# Patient Record
Sex: FEMALE | NOT HISPANIC OR LATINO | Employment: OTHER | URBAN - METROPOLITAN AREA
[De-identification: names, ages, dates, MRNs, and addresses within clinical notes are randomized per-mention and may not be internally consistent; named-entity substitution may affect disease eponyms.]

---

## 2017-12-12 ENCOUNTER — OFFICE VISIT (OUTPATIENT)
Dept: FAMILY MEDICINE | Facility: CLINIC | Age: 82
End: 2017-12-12
Payer: COMMERCIAL

## 2017-12-12 ENCOUNTER — RADIANT APPOINTMENT (OUTPATIENT)
Dept: GENERAL RADIOLOGY | Facility: CLINIC | Age: 82
End: 2017-12-12
Attending: INTERNAL MEDICINE
Payer: COMMERCIAL

## 2017-12-12 VITALS
SYSTOLIC BLOOD PRESSURE: 110 MMHG | RESPIRATION RATE: 20 BRPM | HEIGHT: 62 IN | DIASTOLIC BLOOD PRESSURE: 70 MMHG | HEART RATE: 89 BPM | TEMPERATURE: 97.4 F | BODY MASS INDEX: 29.26 KG/M2 | WEIGHT: 159 LBS | OXYGEN SATURATION: 92 %

## 2017-12-12 DIAGNOSIS — K21.9 GASTROESOPHAGEAL REFLUX DISEASE WITHOUT ESOPHAGITIS: ICD-10-CM

## 2017-12-12 DIAGNOSIS — G47.00 INSOMNIA, UNSPECIFIED TYPE: ICD-10-CM

## 2017-12-12 DIAGNOSIS — R05.3 CHRONIC COUGH: ICD-10-CM

## 2017-12-12 DIAGNOSIS — M81.0 OSTEOPOROSIS WITHOUT CURRENT PATHOLOGICAL FRACTURE, UNSPECIFIED OSTEOPOROSIS TYPE: ICD-10-CM

## 2017-12-12 DIAGNOSIS — R05.3 CHRONIC COUGH: Primary | ICD-10-CM

## 2017-12-12 DIAGNOSIS — K59.00 CONSTIPATION, UNSPECIFIED CONSTIPATION TYPE: ICD-10-CM

## 2017-12-12 DIAGNOSIS — F32.A DEPRESSION, UNSPECIFIED DEPRESSION TYPE: ICD-10-CM

## 2017-12-12 DIAGNOSIS — I10 HYPERTENSION, UNSPECIFIED TYPE: ICD-10-CM

## 2017-12-12 PROCEDURE — 99204 OFFICE O/P NEW MOD 45 MIN: CPT | Performed by: INTERNAL MEDICINE

## 2017-12-12 PROCEDURE — 71020 XR CHEST 2 VW: CPT

## 2017-12-12 RX ORDER — GUAIFENESIN/DEXTROMETHORPHAN 100-10MG/5
5 SYRUP ORAL 4 TIMES DAILY PRN
Qty: 354 ML | Refills: 3 | Status: SHIPPED | OUTPATIENT
Start: 2017-12-12 | End: 2018-08-01

## 2017-12-12 RX ORDER — SENNOSIDES 8.6 MG
3 TABLET ORAL DAILY
Qty: 270 TABLET | Refills: 3 | Status: SHIPPED | OUTPATIENT
Start: 2017-12-12 | End: 2019-06-13

## 2017-12-12 RX ORDER — NICOTINE POLACRILEX 4 MG/1
20 GUM, CHEWING ORAL DAILY
Qty: 90 TABLET | Refills: 3 | Status: SHIPPED | OUTPATIENT
Start: 2017-12-12 | End: 2019-08-20

## 2017-12-12 RX ORDER — CYCLOBENZAPRINE HCL 5 MG
TABLET ORAL
COMMUNITY
Start: 2016-09-21 | End: 2017-12-12

## 2017-12-12 RX ORDER — ALENDRONATE SODIUM 70 MG/1
70 TABLET ORAL
Qty: 12 TABLET | Refills: 3 | Status: SHIPPED | OUTPATIENT
Start: 2017-12-12 | End: 2018-12-04

## 2017-12-12 RX ORDER — MULTIVITAMIN
1 TABLET ORAL DAILY
Qty: 90 TABLET | Refills: 3 | Status: SHIPPED | OUTPATIENT
Start: 2017-12-12 | End: 2019-05-20

## 2017-12-12 RX ORDER — ALBUTEROL SULFATE 0.63 MG/3ML
0.63 SOLUTION RESPIRATORY (INHALATION)
COMMUNITY
Start: 2015-11-13 | End: 2019-01-21

## 2017-12-12 RX ORDER — ALENDRONATE SODIUM 70 MG/1
TABLET ORAL
COMMUNITY
Start: 2017-05-15 | End: 2017-12-12

## 2017-12-12 RX ORDER — ACETAMINOPHEN 325 MG/1
650 TABLET ORAL EVERY 4 HOURS PRN
COMMUNITY
Start: 2012-02-09

## 2017-12-12 RX ORDER — ALBUTEROL SULFATE 0.83 MG/ML
2.5 SOLUTION RESPIRATORY (INHALATION) EVERY 4 HOURS PRN
Status: ON HOLD | COMMUNITY
Start: 2014-09-18 | End: 2024-06-07

## 2017-12-12 RX ORDER — AMLODIPINE BESYLATE 2.5 MG/1
2.5 TABLET ORAL DAILY
Qty: 90 TABLET | Refills: 3 | Status: SHIPPED | OUTPATIENT
Start: 2017-12-12 | End: 2019-08-20

## 2017-12-12 RX ORDER — CETIRIZINE HYDROCHLORIDE 10 MG/1
10 TABLET ORAL
COMMUNITY
Start: 2015-10-29 | End: 2020-11-25

## 2017-12-12 RX ORDER — LISINOPRIL 10 MG/1
10 TABLET ORAL
COMMUNITY
Start: 2015-12-22 | End: 2017-12-12

## 2017-12-12 RX ORDER — ESCITALOPRAM OXALATE 10 MG/1
TABLET ORAL
COMMUNITY
Start: 2016-11-21 | End: 2017-12-12

## 2017-12-12 RX ORDER — ZOLPIDEM TARTRATE 5 MG/1
5 TABLET ORAL
Qty: 30 TABLET | Refills: 5 | Status: SHIPPED | OUTPATIENT
Start: 2017-12-12 | End: 2018-03-27

## 2017-12-12 ASSESSMENT — ANXIETY QUESTIONNAIRES
6. BECOMING EASILY ANNOYED OR IRRITABLE: NOT AT ALL
7. FEELING AFRAID AS IF SOMETHING AWFUL MIGHT HAPPEN: SEVERAL DAYS
3. WORRYING TOO MUCH ABOUT DIFFERENT THINGS: NOT AT ALL
4. TROUBLE RELAXING: NOT AT ALL
GAD7 TOTAL SCORE: 1
GAD7 TOTAL SCORE: 1
5. BEING SO RESTLESS THAT IT IS HARD TO SIT STILL: NOT AT ALL
7. FEELING AFRAID AS IF SOMETHING AWFUL MIGHT HAPPEN: SEVERAL DAYS
1. FEELING NERVOUS, ANXIOUS, OR ON EDGE: NOT AT ALL
GAD7 TOTAL SCORE: 1
2. NOT BEING ABLE TO STOP OR CONTROL WORRYING: NOT AT ALL

## 2017-12-12 ASSESSMENT — PATIENT HEALTH QUESTIONNAIRE - PHQ9
SUM OF ALL RESPONSES TO PHQ QUESTIONS 1-9: 4
SUM OF ALL RESPONSES TO PHQ QUESTIONS 1-9: 4

## 2017-12-12 NOTE — PATIENT INSTRUCTIONS
Your chest xray looks good.                   You can use the cough suppressant as needed; up to 4 times per day,one teaspoon per dose.                           Have a good, safe winter. Please follow up in the spring.

## 2017-12-12 NOTE — MR AVS SNAPSHOT
After Visit Summary   12/12/2017    Aracelis Blakely    MRN: [id number]           Patient Information     Date Of Birth          3/21/1922        Visit Information        Provider Department      12/12/2017 1:30 PM Truman Palma MD; [facility]        Today's Diagnoses     Chronic cough    -  1    Hypertension, unspecified type        Insomnia, unspecified type        Gastroesophageal reflux disease without esophagitis        Osteoporosis without current pathological fracture, unspecified osteoporosis type        Constipation, unspecified constipation type        Depression, unspecified depression type          Care Instructions    Your chest xray looks good.                   You can use the cough suppressant as needed; up to 4 times per day,one teaspoon per dose.                           Have a good, safe winter. Please follow up in the spring.                          Follow-ups after your visit        Who to contact     If you have questions or need follow up information about today's clinic visit or your schedule please contact [facility] directly at [phone].  Normal or non-critical lab and imaging results will be communicated to you by MyChart, letter or phone within 4 business days after the clinic has received the results. If you do not hear from us within 7 days, please contact the clinic through MyChart or phone. If you have a critical or abnormal lab result, we will notify you by phone as soon as possible.  Submit refill requests through [employer] or call your pharmacy and they will forward the refill request to us. Please allow 3 business days for your refill to be completed.          Additional Information About Your Visit        Care EveryWhere ID     This is your Care EveryWhere ID. This could be used by other organizations to access your [place] medical records  [id number]        Your Vitals  "Were     Pulse Temperature Respirations Height Pulse Oximetry Breastfeeding?    89 97.4  F (36.3  C) 20 5' 2\" (1.575 m) 92% No    BMI (Body Mass Index)                   29.08 kg/m2            Blood Pressure from Last 3 Encounters:   12/12/17 110/70   09/17/16 (!) 172/121   06/23/16 131/73    Weight from Last 3 Encounters:   12/12/17 159 lb (72.1 kg)   09/17/16 160 lb (72.6 kg)   06/22/16 160 lb (72.6 kg)                 Today's Medication Changes          These changes are accurate as of: 12/12/17  2:39 PM.  If you have any questions, ask your nurse or doctor.               Start taking these medicines.        Dose/Directions    DAILY NETTA Tabs   Used for:  Osteoporosis without current pathological fracture, unspecified osteoporosis type   Started by:  Truman Palma MD        Dose:  1 tablet   Take 1 tablet by mouth daily   Quantity:  90 tablet   Refills:  3       guaiFENesin-dextromethorphan 100-10 MG/5ML syrup   Commonly known as:  ROBITUSSIN DM   Used for:  Chronic cough   Started by:  Truman Palma MD        Dose:  5 mL   Take 5 mLs by mouth 4 times daily as needed for cough   Quantity:  354 mL   Refills:  3         These medicines have changed or have updated prescriptions.        Dose/Directions    alendronate 70 MG tablet   Commonly known as:  FOSAMAX   This may have changed:    - how much to take  - when to take this   Used for:  Osteoporosis without current pathological fracture, unspecified osteoporosis type   Changed by:  Truman Palma MD        Dose:  70 mg   Take 1 tablet (70 mg) by mouth every 7 days   Quantity:  12 tablet   Refills:  3       amLODIPine 2.5 MG tablet   Commonly known as:  NORVASC   This may have changed:    - medication strength  - how much to take   Used for:  Hypertension, unspecified type   Changed by:  Truman Palma MD        Dose:  2.5 mg   Take 1 tablet (2.5 mg) by mouth daily   Quantity:  90 tablet   Refills:  3       sennosides 8.6 MG tablet   Commonly known as:  " SENOKOT   This may have changed:  how much to take   Used for:  Constipation, unspecified constipation type   Changed by:  Truman Palma MD        Dose:  3 tablet   Take 3 tablets by mouth daily   Quantity:  270 tablet   Refills:  3       sertraline 50 MG tablet   Commonly known as:  ZOLOFT   This may have changed:  when to take this   Used for:  Depression, unspecified depression type   Changed by:  Truman Palma MD        Dose:  50 mg   Take 1 tablet (50 mg) by mouth daily   Quantity:  90 tablet   Refills:  3         Stop taking these medicines if you haven't already. Please contact your care team if you have questions.     cyclobenzaprine 5 MG tablet   Commonly known as:  FLEXERIL   Stopped by:  Truman Palma MD           escitalopram 10 MG tablet   Commonly known as:  LEXAPRO   Stopped by:  Truman Palma MD           furosemide 20 MG tablet   Commonly known as:  LASIX   Stopped by:  Truman Palma MD           lisinopril 10 MG tablet   Commonly known as:  PRINIVIL/ZESTRIL   Stopped by:  Truman Palma MD                Where to get your medicines      Some of these will need a paper prescription and others can be bought over the counter.  Ask your nurse if you have questions.     Bring a paper prescription for each of these medications     alendronate 70 MG tablet    amLODIPine 2.5 MG tablet    aspirin 81 MG EC tablet    Calcium carb-Vitamin D 500 mg [place]-200 units 500-200 MG-UNIT per tablet    DAILY NETTA Tabs    guaiFENesin-dextromethorphan 100-10 MG/5ML syrup    omeprazole 20 MG tablet    sennosides 8.6 MG tablet    sertraline 50 MG tablet    zolpidem 5 MG tablet                Primary Care Provider Office Phone # Fax #    Oscar Stafford [phone]       [address] [address]        Equal Access to Services     VIRAJ ROSA AH: Cristel Ross, evangelista bell, jamel santiago  lula buisummer la'aan ah. So [facility] [phone].    ATENCIÓN: Si romulo bahena, tiene a bravo disposición servicios gratuitos de asistencia lingüística. Janett vázquez [phone].    We comply with applicable federal civil rights laws and [place] laws. We do not discriminate on the basis of race, color, national origin, age, disability, sex, sexual orientation, or gender identity.            Thank you!     Thank you for choosing [facility]  for your care. Our goal is always to provide you with excellent care. Hearing back from our patients is one way we can continue to improve our services. Please take a few minutes to complete the written survey that you may receive in the mail after your visit with us. Thank you!             Your Updated Medication List - Protect others around you: Learn how to safely use, store and throw away your medicines at www.disposemymeds.org.          This list is accurate as of: 12/12/17  2:39 PM.  Always use your most recent med list.                   Brand Name Dispense Instructions for use Diagnosis    acetaminophen 325 MG tablet    TYLENOL     Take 325-650 mg by mouth        * albuterol (2.5 MG/3ML) 0.083% neb solution      Inhale 2.5 mg into the lungs        * albuterol 0.63 MG/3ML nebulizer solution    ACCUNEB     Inhale 0.63 mg into the lungs        alendronate 70 MG tablet    FOSAMAX    12 tablet    Take 1 tablet (70 mg) by mouth every 7 days    Osteoporosis without current pathological fracture, unspecified osteoporosis type       amLODIPine 2.5 MG tablet    NORVASC    90 tablet    Take 1 tablet (2.5 mg) by mouth daily    Hypertension, unspecified type       aspirin 81 MG EC tablet     90 tablet    Take 1 tablet (81 mg) by mouth daily    Hypertension, unspecified type       B-12 100 MCG Tabs           Calcium carb-Vitamin D 500 mg [place]-200 units 500-200 MG-UNIT per tablet    OSCAL with D;Oyster Shell Calcium    180 tablet    Take 1 tablet by mouth 2 times  daily (with meals)        cetirizine 10 MG tablet    zyrTEC     Take 10 mg by mouth        DAILY NETTA Tabs     90 tablet    Take 1 tablet by mouth daily    Osteoporosis without current pathological fracture, unspecified osteoporosis type       guaiFENesin-dextromethorphan 100-10 MG/5ML syrup    ROBITUSSIN DM    354 mL    Take 5 mLs by mouth 4 times daily as needed for cough    Chronic cough       OMEGA-3 FISH OIL PO           omeprazole 20 MG tablet     90 tablet    Take 1 tablet (20 mg) by mouth daily    Gastroesophageal reflux disease without esophagitis       order for DME      Knee high compression stockings.  Light compression.  16-20mm Hg.  Ok 3 pairs.        sennosides 8.6 MG tablet    SENOKOT    270 tablet    Take 3 tablets by mouth daily    Constipation, unspecified constipation type       sertraline 50 MG tablet    ZOLOFT    90 tablet    Take 1 tablet (50 mg) by mouth daily    Depression, unspecified depression type       ZADITOR 0.025 % Soln ophthalmic solution   Generic drug:  ketotifen      1 drop        zolpidem 5 MG tablet    AMBIEN    30 tablet    Take 1 tablet (5 mg) by mouth nightly as needed for sleep    Insomnia, unspecified type       * Notice:  This list has 2 medication(s) that are the same as other medications prescribed for you. Read the directions carefully, and ask your doctor or other care provider to review them with you.

## 2017-12-12 NOTE — PROGRESS NOTES
"  SUBJECTIVE:   Aracelis Blakely is a 89 year old female who presents to clinic today for the following health issues:      New Patient/Transfer of Care- Establish Care here.        This 89-year-old woman is transferring from [facility].                The patient speaks no English.  She is originally from [place].               She is here with an .          Her last office visit with labs was in June 2017.                    She takes multiple medications, and we went through each 1 of them in turn.  She wants all of her medications refilled.                                                                                                                                                    her complaints include 3 months of cough.           \"dry,non-productive,worse at night\".         This is not especially bothersome.  She has never been a smoker.    No history of lung disease.  She would like a cough suppressant.                                                                                                                                                                           Some of her other health issues include that she takes a  PPI on a chronic, long-term basis.  This is in treatment of esophageal reflux symptoms.         She has no dysphagia.         Despite her GERD, she has taken alendronate \"for the past year\". Seems to tolerate ok.        She also complains of chronic insomnia.  She takes zolpidem; 5 mg BIW approx.         she also has a history of hypertension.  She reports only taking amlodipine for that at this time 2.5 mg per dose.           She also has a history of depression, controlled with sertraline 50 mg per day.  She also has long-standing constipation, treated with senna.       She wants refills of all of her medications, including her over-the-counter supplements.    Problem list and histories reviewed & adjusted, as indicated.      Patient Active Problem List    " Diagnosis Date Noted     Insomnia, unspecified type 12/12/2017     Priority: Medium     Hypertension, unspecified type 12/12/2017     Priority: Medium     Gastroesophageal reflux disease without esophagitis 12/12/2017     Priority: Medium     Osteoporosis without current pathological fracture, unspecified osteoporosis type 12/12/2017     Priority: Medium     HTN (hypertension) 12/30/2011     Priority: Medium     Past Surgical History:   Procedure Laterality Date     RELEASE CARPAL TUNNEL Right 6/13/2016     Social History     Social History     Marital status:      Spouse name: N/A     Number of children: N/A     Years of education: N/A     Occupational History     Not on file.     Social History Main Topics     Smoking status: Never Smoker     Smokeless tobacco: Never Used     Alcohol use No     Drug use: No     Sexual activity: No     Other Topics Concern     Not on file     Social History Narrative     Immunization History   Administered Date(s) Administered     Influenza (High Dose) 3 valent vaccine 10/15/2017     Influenza (IIV3) PF 09/19/2012, 09/27/2013, 10/20/2014, 10/29/2015, 11/18/2016     Pneumococcal (PCV 13) 11/13/2015     Pneumococcal 23 valent 10/15/2008     TDAP Vaccine (Adacel) 01/08/2014     Zoster vaccine, live 12/07/2009, 08/28/2013       Current Outpatient Prescriptions   Medication Sig Dispense Refill     albuterol (ACCUNEB) 0.63 MG/3ML nebulizer solution Inhale 0.63 mg into the lungs       albuterol (2.5 MG/3ML) 0.083% neb solution Inhale 2.5 mg into the lungs       acetaminophen (TYLENOL) 325 MG tablet Take 325-650 mg by mouth       alendronate (FOSAMAX) 70 MG tablet        cetirizine (ZYRTEC) 10 MG tablet Take 10 mg by mouth       lisinopril (PRINIVIL/ZESTRIL) 10 MG tablet Take 10 mg by mouth       ketotifen (ZADITOR) 0.025 % SOLN ophthalmic solution 1 drop       sertraline (ZOLOFT) 50 MG tablet Take 50 mg by mouth       order for DME Knee high compression stockings.  Light  "compression.  16-20mm Hg.  Ok 3 pairs.       Cyanocobalamin (B-12) 100 MCG TABS        Omega-3 Fatty Acids (OMEGA-3 FISH OIL PO)        amLODIPine (NORVASC) 5 MG tablet Take 5 mg by mouth daily.       calcium carb 1250 mg, 500 mg [place],/vitamin D 200 units (OSCAL WITH D) 500-200 MG-UNIT per tablet Take 1 tablet by mouth 2 times daily (with meals). 100 tablet 3     sennosides (SENNA) 8.6 MG tablet Take 2 tablets by mouth daily. 180 tablet 3     aspirin 81 MG EC tablet Take 1 tablet by mouth daily. 90 tablet 3     zolpidem (AMBIEN) 5 MG tablet Take 1 tablet by mouth nightly as needed for sleep. 30 tablet 5     Omeprazole 20 MG tablet Take 20 mg by mouth daily. 90 tablet 3     No Known Allergies  BP Readings from Last 3 Encounters:   12/12/17 110/70   09/17/16 (!) 172/121   06/23/16 131/73    Wt Readings from Last 3 Encounters:   12/12/17 159 lb (72.1 kg)   09/17/16 160 lb (72.6 kg)   06/22/16 160 lb (72.6 kg)                      Reviewed and updated as needed this visit by clinical staff       Reviewed and updated as needed this visit by Provider         ROS: See above plus  CONSTITUTIONAL:NEGATIVE for fever, chills, change in weight  RESP:NEGATIVE for cough-productive, hemoptysis and wheezing  CV: NEGATIVE for chest pain, palpitations or peripheral edema  GI: NEGATIVE for hematochezia and melena  PSYCHIATRIC: NEGATIVE for alcohol use    OBJECTIVE:                                                    /70 (BP Location: Left arm, Patient Position: Chair, Cuff Size: Adult Large)  Pulse 89  Temp 97.4  F (36.3  C)  Resp 20  Ht 5' 2\" (1.575 m)  Wt 159 lb (72.1 kg)  SpO2 92%  Breastfeeding? No  BMI 29.08 kg/m2  Body mass index is 29.08 kg/(m^2).  GENERAL APPEARANCE: alert, no distress and over weight  RESP: no rales or rhonchi  CV: regular rates and rhythm, normal S1 S2, no S3 or S4 and no murmur, click or rub, trace peripheral edema  MS: Mild kyphosis  NEURO: Normal strength and tone and she converses readily " with the , who reports this patient's cognition is intact even at her advanced age.    Diagnostic test results:  Recent Results (from the past 24 hour(s))   XR Chest 2 Views    Narrative    CHEST TWO VIEWS  12/12/2017 2:37 PM     HISTORY: Chronic cough       Impression    IMPRESSION: PA and lateral views of the chest. Lungs are clear. Heart  is normal in size. No effusions are evident. No pneumothorax. Mild  kyphosis lower thoracic spine is noted. No vertebral body compression  fracture is evident.    NABEEL GRIFFIN MD              ASSESSMENT/PLAN:                                                        ICD-10-CM    1. Chronic cough R05 XR Chest 2 Views     guaiFENesin-dextromethorphan (ROBITUSSIN DM) 100-10 MG/5ML syrup   2. Hypertension, unspecified type I10 amLODIPine (NORVASC) 2.5 MG tablet     aspirin 81 MG EC tablet   3. Insomnia, unspecified type G47.00 zolpidem (AMBIEN) 5 MG tablet   4. Gastroesophageal reflux disease without esophagitis K21.9 omeprazole 20 MG tablet   5. Osteoporosis without current pathological fracture, unspecified osteoporosis type M81.0 alendronate (FOSAMAX) 70 MG tablet     Multiple Vitamin (DAILY NETTA) TABS   6. Constipation, unspecified constipation type K59.00 sennosides (SENOKOT) 8.6 MG tablet   7. Depression, unspecified depression type F32.9 sertraline (ZOLOFT) 50 MG tablet       Summary and implications:  We reviewed her situation at length, along with her past history, her current situation and complaints, and her multiple medications.                        We do not find a serious etiology for her fairly minimal cough.  We will plan to monitor that for now.           I refilled each of her medications, they all seem to be reasonable treatments for her various conditions.                                   40 minute visit,over half counseling and coordination of care      Patient Instructions   Your chest xray looks good.                   You can use the cough  suppressant as needed; up to 4 times per day,one teaspoon per dose.                           Have a good, safe winter. Please follow up in the spring.                      Truman Palma MD  [facility]  Answers for HPI/ROS submitted by the patient on 12/12/2017   MIRA 7 TOTAL SCORE: 1  PHQ9 TOTAL SCORE: 4

## 2017-12-12 NOTE — NURSING NOTE
"Chief Complaint   Patient presents with     Establish Care       Initial /70 (BP Location: Left arm, Patient Position: Chair, Cuff Size: Adult Large)  Pulse 89  Temp 97.4  F (36.3  C)  Resp 20  Ht 5' 2\" (1.575 m)  Wt 159 lb (72.1 kg)  SpO2 92%  Breastfeeding? No  BMI 29.08 kg/m2 Estimated body mass index is 29.08 kg/(m^2) as calculated from the following:    Height as of this encounter: 5' 2\" (1.575 m).    Weight as of this encounter: 159 lb (72.1 kg).  Medication Reconciliation: complete   Nahomy Russo LPN  "

## 2017-12-13 ASSESSMENT — ANXIETY QUESTIONNAIRES: GAD7 TOTAL SCORE: 1

## 2017-12-21 ENCOUNTER — TELEPHONE (OUTPATIENT)
Dept: FAMILY MEDICINE | Facility: CLINIC | Age: 82
End: 2017-12-21

## 2017-12-21 NOTE — TELEPHONE ENCOUNTER
Reason for Call:  Form, our goal is to have forms completed with 72 hours, however, some forms may require a visit or additional information.    Type of letter, form or note:  medical    Who is the form from?: Home care    Where did the form come from: form was faxed in    What clinic location was the form placed at?: [facility]    Where the form was placed: 's Box: Truman Palma MD    What number is listed as a contact on the form?: [phone] phone [phone]       Additional comments: [facility]   disability verification     Call taken on 12/21/2017 at 1:16 PM by Supriya Thompson

## 2018-03-27 DIAGNOSIS — G47.00 INSOMNIA, UNSPECIFIED TYPE: ICD-10-CM

## 2018-03-28 PROBLEM — G47.00 INSOMNIA, UNSPECIFIED TYPE: Status: ACTIVE | Noted: 2017-12-12

## 2018-03-28 RX ORDER — ZOLPIDEM TARTRATE 5 MG/1
2.5 TABLET ORAL
Qty: 15 TABLET | Refills: 1 | Status: SHIPPED | OUTPATIENT
Start: 2018-03-28 | End: 2018-08-31

## 2018-03-28 NOTE — TELEPHONE ENCOUNTER
Controlled Substance Refill Request for zolpidem (AMBIEN) 5 MG tablet   Problem List Complete:  No     PROVIDER TO CONSIDER COMPLETION OF PROBLEM LIST AND OVERVIEW/CONTROLLED SUBSTANCE AGREEMENT    Controlled substance agreement on file: No.     Processing:  [facility] - Fax   checked in past 6 months?  Yes unable to find patient under

## 2018-03-28 NOTE — TELEPHONE ENCOUNTER
ZOLPIDEM TARTRATE 5 MG TABLET  Last Written Prescription Date:  12/12/2017  Last Fill Quantity: 30,   # refills: 5  Last Office Visit: No office visit with our providers  Future Office visit:       Routing refill request to provider for review/approval because:  Drug not on the [facility], [facility] or [facility] refill protocol or controlled substance

## 2018-03-28 NOTE — TELEPHONE ENCOUNTER
Requested Prescriptions   Pending Prescriptions Disp Refills     zolpidem (AMBIEN) 5 MG tablet [Pharmacy Med Name: ZOLPIDEM TARTRATE 5 MG TABLET]  Last Written Prescription Date:  12/12/17  Last Fill Quantity: 30,  # refills: 5   Last office visit: 12/12/2017 with prescribing provider:  Louie   Future Office Visit:     30 tablet 0     Sig: TAKE 1 TABLET BY MOUTH ONCE DAILY AS NEEDED FOR SLEEP.    There is no refill protocol information for this order

## 2018-04-16 ENCOUNTER — PATIENT OUTREACH (OUTPATIENT)
Dept: GERIATRIC MEDICINE | Facility: CLINIC | Age: 83
End: 2018-04-16

## 2018-04-16 NOTE — PROGRESS NOTES
[facility] [place] Care Coordination Contact  Initial Contact:     Client is new enrollee to [facility] effective 04- with [facility]. Client transferred from [facility].    No home visit required because this CM has received all required documentations from the previous CM.    Writer t/c to client, introduced self as client s new CM. Confirmed with client that the welcome letter with writer's name and contact information has been received.  Reviewed HRA/LTCC and POC with client. Client reports doing well with no significant change in health condition.    Writer reviewed the following with client:  ER visits: No  Hospitalizations: No  TCU stays: No    Unable to review any more information w/ client.  Client requested that writer call daughter.  Client tried to call daughter on another phone while still on the phone with  and writer.     Communication History     User: Flora Aquino LSW Date/time: 4/16/2018 12:14 PM    Comment: Introduced self as client's new CM. Requested a call back.    Context:  Outcome: Left Message    Phone number: [phone] Phone Type: Mobile    Comm. type: Telephone Call type: Outgoing    Contact: Yoselin Gonzalez Relation to patient: Emergency Contact    User: Flora Aquino LSW Date/time: 4/16/2018 12:04 PM    Context:  Outcome:     Phone number: [phone] Phone Type: Home Phone    Comm. type: Telephone Call type: Outgoing    Contact: Aracelis Shankar Relation to patient: Self        MARTA Montoya  [facility] [place]  [phone]  Fax: [phone]

## 2018-04-16 NOTE — PROGRESS NOTES
[facility] Care Coordination Contact  Communication History     User: Flora Aquino LSW Date/time: 4/16/2018 12:23 PM    Context:  Outcome:     Phone number: [phone] Phone Type: Mobile    Comm. type: Telephone Call type: Incoming    Contact: Yoselin Gonzalez Relation to patient: Emergency Contact     Significant health status changes: Reported no sig changes  Falls/Injuries: Unknown  ADL/IADL changes: No  Changes in services: No    Follow-Up Plan: Client informed of future contact in June 2018 to schedule Annual Home Visit, .  Contact information shared with member and family, encouraged client to call with any questions or concerns.    MARTA Montoya  [facility]  [phone]  Fax: [phone]

## 2018-05-09 ENCOUNTER — TRANSFERRED RECORDS (OUTPATIENT)
Dept: HEALTH INFORMATION MANAGEMENT | Facility: CLINIC | Age: 83
End: 2018-05-09

## 2018-06-01 ENCOUNTER — PATIENT OUTREACH (OUTPATIENT)
Dept: GERIATRIC MEDICINE | Facility: CLINIC | Age: 83
End: 2018-06-01

## 2018-06-01 NOTE — PROGRESS NOTES
[facility] Care Coordination Contact  Communication History     User: Flora Aquino LSW Date/time: 6/1/2018 11:28 AM    Comment: Spoke w/ Zora and informed her that writer was aware of Aracelis's upcoming PCA assessment and will be scheduling a home visit.    Context:  Outcome:     Phone number:  Phone Type:     Comm. type: Telephone Call type: Outgoing    Contact: [facility]: PCA Relation to patient:     User: Flora Aquino LSW Date/time: 6/1/2018 11:27 AM    Comment: Called as a reminder that Palmas PCA is due by 7/16/18.    Context:  Outcome:     Phone number:  Phone Type:     Comm. type: Telephone Call type: Incoming    Contact: [facility]: PCA Relation to patient:         MARTA Montoya  [facility]  [phone]  Fax: [phone]

## 2018-06-06 ENCOUNTER — TRANSFERRED RECORDS (OUTPATIENT)
Dept: HEALTH INFORMATION MANAGEMENT | Facility: CLINIC | Age: 83
End: 2018-06-06

## 2018-06-11 ENCOUNTER — PATIENT OUTREACH (OUTPATIENT)
Dept: GERIATRIC MEDICINE | Facility: CLINIC | Age: 83
End: 2018-06-11

## 2018-06-11 NOTE — PROGRESS NOTES
[facility] Care Coordination Contact    1st Attempt Annual Home Visit:    Call placed to Rossy utilizing a [employer]  through [employer] to schedule visit appointment to complete the annual HRA. Phone busy, unable to left vm. Will try again.    MARTA Montoya  [facility]  [phone]  Fax: [phone]

## 2018-06-13 ENCOUNTER — PATIENT OUTREACH (OUTPATIENT)
Dept: GERIATRIC MEDICINE | Facility: CLINIC | Age: 83
End: 2018-06-13

## 2018-06-13 NOTE — PROGRESS NOTES
[facility] Coord[facility] Contact  Communication History     User: Flora Aquino LSW Date/time: 6/13/2018  3:49 PM    Comment: [facility] representative reported that Kathia MA is active. No further action needed.    Context:  Outcome:     Phone number:  Phone Type:     Comm. type: Telephone Call type: Outgoing    Contact: [facility] Relation to patient:     User: Flora Aquino LSW Date/time: 6/13/2018  3:49 PM    Comment: Received Notification that MA is inactive    Context:  Outcome:     Phone number:  Phone Type:     Comm. type: Email Call type: Incoming    Contact: MA Inactive Notification Relation to patient:         MARTA Montoya  [facility]  [phone]  Fax: [phone]

## 2018-06-28 NOTE — PROGRESS NOTES
[facility] Care Coordination Contact  Call placed to member to schedule a home visit appointment to complete the annual HRA.  A home visit has been scheduled for Monday, July 9th at 2:00pm.   has been set up.     MARTA Montoya  [facility]  [phone]  Fax: [phone]

## 2018-06-28 NOTE — PROGRESS NOTES
[facility] Contact  2nd Try Annual Home Visit  Communication History     User: Flora Aquino LSW Date/time: 6/28/2018  9:41 AM    Comment: Spoke w/ Daughter.    Context:  Outcome:     Phone number: [phone] Phone Type: Mobile    Comm. type: Telephone Call type: Outgoing    Contact: Yoselin Gonzalez Relation to patient: Emergency Contact    User: Flora Aquino LSW Date/time: 6/28/2018  9:33 AM    Comment: Person that answered the phone said there wasn't a Jamileh at that number.    Context:  Outcome:     Phone number: [phone] Phone Type: Home Phone    Comm. type: Telephone Call type: Outgoing    Contact: Aracelis Shankar Relation to patient: Self     Daughter Yoselin verified that Aracelis's phone number is listed correctly.  Daughter is going to call writer back with a day and time for the annual home visit.    MARTA Montoya  [facility]  [phone]  Fax: [phone]

## 2018-07-09 ENCOUNTER — PATIENT OUTREACH (OUTPATIENT)
Dept: GERIATRIC MEDICINE | Facility: CLINIC | Age: 83
End: 2018-07-09

## 2018-07-09 RX ORDER — AZELASTINE HYDROCHLORIDE 0.5 MG/ML
1 SOLUTION/ DROPS OPHTHALMIC 2 TIMES DAILY
COMMUNITY

## 2018-07-09 ASSESSMENT — ACTIVITIES OF DAILY LIVING (ADL)
DEPENDENT_IADLS:: CLEANING;COOKING;LAUNDRY;SHOPPING;MEAL PREPARATION;TRANSPORTATION;MONEY MANAGEMENT;MEDICATION MANAGEMENT

## 2018-07-09 NOTE — PROGRESS NOTES
[facility] Care Coordination Contact    [facility] Home Visit Assessment     Home visit for Health Risk Assessment with Aracelis Blakely completed on July 9, 2018    Type of residence:: Apartment - handicap accessible  Current living arrangement:: I live alone     Assessment completed with:: Patient, Children, Other ()    Current Care Plan  Member currently receiving the following home care services:  None   Member currently receiving the following community resources: DME, PCA    Medication Review  Medication reconciliation completed in Epic: Yes  Medication set-up & administration: Family/informal caregiver sets up weekly.  Self-administers medications. Needs reminders  Medication understanding concerns (by member, family or CC): No  Medication adherence concerns (by member, family or CC): No    Mental/Behavioral Health   Depression Screening: Not completed d/t language and cultural barrier.  Mental health DX:: No   Mental health DX how managed:: Medication (sertraline (ZOLOFT) 50 MG tablet)  No current  services  Falls Assessment:   Fallen 2 or more times in the past year?: No   Any fall with injury in the past year?: Yes    ADL/IADL Dependencies:   Dependent ADLs:: Ambulation-cane, Ambulation-walker, Bathing, Dressing, Grooming, Positioning  Dependent IADLs:: Cleaning, Cooking, Laundry, Shopping, Meal Preparation, Transportation, Money Management, Medication Management    [facility] Plan sponsored benefits: Shared information re: Silver Sneakers/gym memberships, ASA, Calcium +D.    PCA Assessment completed at visit: Yes     Elderly Waiver Eligibility: Yes, but no EW needs. Will not be opening.    Care Plan & Recommendations: Plan of care to remain the same. No concerns.    See CC for detailed assessment information.    Follow-Up Plan: Member informed of future contact, plan to f/u with member with a 6 month telephone assessment.  Contact information shared with member and  family, encouraged member to call with any questions or concerns at any time.    [facility] care continuum providers: Please refer to Health Care Home on the Epic Problem List to view this patient's [facility] Care Plan Summary.    MARTA Montoya  [facility]  [phone]  Fax: [phone]

## 2018-07-18 ENCOUNTER — PATIENT OUTREACH (OUTPATIENT)
Dept: GERIATRIC MEDICINE | Facility: CLINIC | Age: 83
End: 2018-07-18

## 2018-07-19 ENCOUNTER — TELEPHONE (OUTPATIENT)
Dept: GERIATRIC MEDICINE | Facility: CLINIC | Age: 83
End: 2018-07-19

## 2018-07-19 ENCOUNTER — PATIENT OUTREACH (OUTPATIENT)
Dept: GERIATRIC MEDICINE | Facility: CLINIC | Age: 83
End: 2018-07-19

## 2018-07-19 NOTE — LETTER
[address], [address]  [place] [address]  Phone:  [phone]  Fax:  [phone]        July 19, 2018    ARACELIS DIAZ  [address]   [facility] [address]    Dear Aracelis,    Kelly is a copy of your completed PCA Assessment and Service Plan.  This is for your records and no action is required by you.  If you have additional questions regarding your assessment please contact me at [phone]. If you feel that your needs are not being met, please contact the Clinical Supervisor at [phone].    Sincerely,      Flora Aquino, \[facility]\""  Care Coordinator  [facility]  [phone]      Enclosure:  Completed PCA assessment

## 2018-07-19 NOTE — PROGRESS NOTES
[facility] Care Coordination Contact  Communication History     User: Flora Aquino LSW Date/time: 7/19/2018  2:27 PM    Comment: Spoke w/ Yoselin    Context:  Outcome:     Phone number: [phone] Phone Type: Home Phone    Comm. type: Telephone Call type: Outgoing    Contact: Yoselin Gonzalez Relation to patient: Emergency Contact        Clarified PCA information with Yoselin. Informed Yoselin that Ubaldo[facility]'s PCA hours would remain the same.    MARTA Montoya  [facility]  [phone]  Fax: [phone]

## 2018-07-19 NOTE — PROGRESS NOTES
"[facility] Care Coordination Contact  Communication History     User: Flora Aquino LSW Date/time: 7/19/2018  2:23 PM    Comment: [email or web address]    Context:  Outcome:     Phone number:  Phone Type:     Comm. type: Email Call type: Outgoing    Contact: [facility] Peorialuis miguel Care: 'Norris Conte' < Relation to patient: Other     \"Hi Norris,    I just wanted to let you know that Jamileh s PCA hours will remain the same. We will send you over the service plan and [employer] will set the new dates.      MARTA Montoya    [address] [address]  [email or web address]   [email or web address]   Office: [phone]  Cell: [phone]  Fax [phone]    Connect with [facility] on social media.\"    MARTA Montoya  [facility]  [phone]  Fax: [phone]    "

## 2018-07-19 NOTE — PROGRESS NOTES
"[facility] Care Coordination Contact  Communication History     User: Flora Aquino LSW Date/time: 7/19/2018  2:25 PM    Comment: [email or web address]    Context:  Outcome:     Phone number:  Phone Type:     Comm. type: Email Call type: Incoming    Contact: [facility] Care: 'Norris Conte' < Relation to patient: Other     \"Hi,  I sent your request to our billing and they should respond back to you later today.  Thank you,  Norris Conte  Navigator  Office: [phone]  Fax: [phone]  [address] [place] [address], [address] [address]  Email: [email or web address]   Website: [email or web address] \"    MARTA Montoya [facility]  [phone]  Fax: [phone]    "

## 2018-07-19 NOTE — PROGRESS NOTES
[facility] Care Coordination Contact  [facility]:  Faxed completed PCA assessment to PCA Agency and mailed copies to member.  Faxed MD Communication to PCP.  Emailed referral form for auth to Randy.    Iona Dhillon  Case Management Specialist  [facility]  [phone]

## 2018-07-31 PROBLEM — F32.A DEPRESSION, UNSPECIFIED DEPRESSION TYPE: Status: ACTIVE | Noted: 2018-07-31

## 2018-07-31 PROBLEM — R05.3 CHRONIC COUGH: Status: ACTIVE | Noted: 2018-07-31

## 2018-07-31 PROBLEM — M81.0 OSTEOPOROSIS WITHOUT CURRENT PATHOLOGICAL FRACTURE, UNSPECIFIED OSTEOPOROSIS TYPE: Status: ACTIVE | Noted: 2017-12-12

## 2018-07-31 PROBLEM — K59.00 CONSTIPATION, UNSPECIFIED CONSTIPATION TYPE: Status: ACTIVE | Noted: 2018-07-31

## 2018-07-31 PROBLEM — K21.9 GASTROESOPHAGEAL REFLUX DISEASE WITHOUT ESOPHAGITIS: Status: ACTIVE | Noted: 2017-12-12

## 2018-08-01 ENCOUNTER — OFFICE VISIT (OUTPATIENT)
Dept: FAMILY MEDICINE | Facility: CLINIC | Age: 83
End: 2018-08-01
Payer: COMMERCIAL

## 2018-08-01 ENCOUNTER — PATIENT OUTREACH (OUTPATIENT)
Dept: GERIATRIC MEDICINE | Facility: CLINIC | Age: 83
End: 2018-08-01

## 2018-08-01 VITALS
WEIGHT: 159 LBS | HEART RATE: 93 BPM | BODY MASS INDEX: 29.26 KG/M2 | RESPIRATION RATE: 20 BRPM | HEIGHT: 62 IN | SYSTOLIC BLOOD PRESSURE: 134 MMHG | DIASTOLIC BLOOD PRESSURE: 74 MMHG | OXYGEN SATURATION: 92 %

## 2018-08-01 DIAGNOSIS — M81.0 OSTEOPOROSIS WITHOUT CURRENT PATHOLOGICAL FRACTURE, UNSPECIFIED OSTEOPOROSIS TYPE: ICD-10-CM

## 2018-08-01 DIAGNOSIS — I10 HYPERTENSION, UNSPECIFIED TYPE: ICD-10-CM

## 2018-08-01 DIAGNOSIS — G47.00 INSOMNIA, UNSPECIFIED TYPE: ICD-10-CM

## 2018-08-01 DIAGNOSIS — I83.93 VARICOSE VEINS OF BOTH LOWER EXTREMITIES: ICD-10-CM

## 2018-08-01 DIAGNOSIS — Z00.00 ROUTINE GENERAL MEDICAL EXAMINATION AT A HEALTH CARE FACILITY: Primary | ICD-10-CM

## 2018-08-01 DIAGNOSIS — K21.9 GASTROESOPHAGEAL REFLUX DISEASE WITHOUT ESOPHAGITIS: ICD-10-CM

## 2018-08-01 DIAGNOSIS — F32.A DEPRESSION, UNSPECIFIED DEPRESSION TYPE: ICD-10-CM

## 2018-08-01 DIAGNOSIS — K59.00 CONSTIPATION, UNSPECIFIED CONSTIPATION TYPE: ICD-10-CM

## 2018-08-01 DIAGNOSIS — R05.3 CHRONIC COUGH: ICD-10-CM

## 2018-08-01 LAB
ALBUMIN SERPL-MCNC: 3.9 G/DL (ref 3.4–5)
ALP SERPL-CCNC: 57 U/L (ref 40–150)
ALT SERPL W P-5'-P-CCNC: 18 U/L (ref 0–50)
ANION GAP SERPL CALCULATED.3IONS-SCNC: 4 MMOL/L (ref 3–14)
AST SERPL W P-5'-P-CCNC: 15 U/L (ref 0–45)
BASOPHILS # BLD AUTO: 0 10E9/L (ref 0–0.2)
BASOPHILS NFR BLD AUTO: 0.4 %
BILIRUB SERPL-MCNC: 1.1 MG/DL (ref 0.2–1.3)
BUN SERPL-MCNC: 10 MG/DL (ref 7–30)
CALCIUM SERPL-MCNC: 9.2 MG/DL (ref 8.5–10.1)
CHLORIDE SERPL-SCNC: 102 MMOL/L (ref 94–109)
CO2 SERPL-SCNC: 33 MMOL/L (ref 20–32)
CREAT SERPL-MCNC: 0.66 MG/DL (ref 0.52–1.04)
DIFFERENTIAL METHOD BLD: NORMAL
EOSINOPHIL # BLD AUTO: 0.2 10E9/L (ref 0–0.7)
EOSINOPHIL NFR BLD AUTO: 4.3 %
ERYTHROCYTE [DISTWIDTH] IN BLOOD BY AUTOMATED COUNT: 11.9 % (ref 10–15)
GFR SERPL CREATININE-BSD FRML MDRD: 84 ML/MIN/1.7M2
GLUCOSE SERPL-MCNC: 96 MG/DL (ref 70–99)
HCT VFR BLD AUTO: 40.4 % (ref 35–47)
HGB BLD-MCNC: 13.5 G/DL (ref 11.7–15.7)
LYMPHOCYTES # BLD AUTO: 1.5 10E9/L (ref 0.8–5.3)
LYMPHOCYTES NFR BLD AUTO: 27.4 %
MCH RBC QN AUTO: 29.2 PG (ref 26.5–33)
MCHC RBC AUTO-ENTMCNC: 33.4 G/DL (ref 31.5–36.5)
MCV RBC AUTO: 87 FL (ref 78–100)
MONOCYTES # BLD AUTO: 0.6 10E9/L (ref 0–1.3)
MONOCYTES NFR BLD AUTO: 10.3 %
NEUTROPHILS # BLD AUTO: 3.1 10E9/L (ref 1.6–8.3)
NEUTROPHILS NFR BLD AUTO: 57.6 %
PLATELET # BLD AUTO: 220 10E9/L (ref 150–450)
POTASSIUM SERPL-SCNC: 4.1 MMOL/L (ref 3.4–5.3)
PROT SERPL-MCNC: 7.2 G/DL (ref 6.8–8.8)
RBC # BLD AUTO: 4.62 10E12/L (ref 3.8–5.2)
SODIUM SERPL-SCNC: 139 MMOL/L (ref 133–144)
TSH SERPL DL<=0.005 MIU/L-ACNC: 1.59 MU/L (ref 0.4–4)
WBC # BLD AUTO: 5.4 10E9/L (ref 4–11)

## 2018-08-01 PROCEDURE — 80053 COMPREHEN METABOLIC PANEL: CPT | Performed by: INTERNAL MEDICINE

## 2018-08-01 PROCEDURE — 36415 COLL VENOUS BLD VENIPUNCTURE: CPT | Performed by: INTERNAL MEDICINE

## 2018-08-01 PROCEDURE — 85025 COMPLETE CBC W/AUTO DIFF WBC: CPT | Performed by: INTERNAL MEDICINE

## 2018-08-01 PROCEDURE — 84443 ASSAY THYROID STIM HORMONE: CPT | Performed by: INTERNAL MEDICINE

## 2018-08-01 PROCEDURE — G0439 PPPS, SUBSEQ VISIT: HCPCS | Performed by: INTERNAL MEDICINE

## 2018-08-01 RX ORDER — GUAIFENESIN/DEXTROMETHORPHAN 100-10MG/5
5 SYRUP ORAL 4 TIMES DAILY PRN
Qty: 354 ML | Refills: 3 | Status: SHIPPED | OUTPATIENT
Start: 2018-08-01 | End: 2019-04-02

## 2018-08-01 ASSESSMENT — ACTIVITIES OF DAILY LIVING (ADL): CURRENT_FUNCTION: HOUSEWORK REQUIRES ASSISTANCE

## 2018-08-01 NOTE — LETTER
August 1, 2018    Important Plan Information    ARACELIS KENNEDY JOE  [address]   [place] [address]  Your Care Plan  Dear Aracelis,  When we spoke recently, I promised to send you a Care Plan. The plan enclosed is a summary of our discussion. It includes the steps we agreed would help you meet your health goals. In addition, I can help you with:  [id number]  This program is available to members who need a ride to medical and dental visits. To schedule a ride, call [phone] or [phone] (toll free). TTY/TTD: 711. You can call Monday - Thursday 8 a.m. to 5 p.m. and Fridays 9 a.m. to 5 p.m.   [employer]   The [employer] program empowers you to improve your health through education and exercise. To learn more, visit Journeys, or call [employer] at [phone] (toll free) (TTY:711) from 7 a.m. - 7 p.m. Central Time, Monday-Friday.  Health Care Directive   This form helps you outline your health care wishes. You can request a form from me and I will answer any questions you have before you discuss it with your doctor.   Annual Physical  Take a key step on your path to good health and set up an annual physical at your clinic.  Questions?  Call me at [phone] Monday-Friday between 8am and 5pm.  TTY/TTD: 711. As we discussed, I plan to be in touch with you again in 6 months to follow up via phone.  Sincerely,      Flora Aquino, [facility]  [phone]    cc: member records            American Indians can continue to use [place] and  Health Services (IHS) clinics. We will not require prior approval or impose any conditions for you to get services at these clinics. For elders age 65 years and older this includes Elderly Waiver (EW) services accessed through the [place]. If a doctor or other provider in a [place] or [facility] clinic refers you to a provider in our network, we will not require you to see your primary care provider prior to the  referral.    For accessible formats of this publication or assistance with equal or access to our services, visit [employer]/contactmedicaid, or call [phone] (toll free) or use your preferred relay service.    Auxiliary Aids and Services.   [employer] provides auxiliary aids and services, like qualified interpreters or information in accessible formats, free of charge and in a timely manner, to ensure an equal opportunity to participate in our health care programs. Contact [employer] Customer Service at [employer]/contactmedicaid or call [phone] (toll free) or use your preferred relay service.    Language Assistance Services.   [employer] provides translated documents and spoken language interpreting, free of charge and in a timely manner, when language assistance services are necessary to ensure limited English speakers have meaningful access to our information and services. Contact [employer] at [employer]/contactmedicaid or call [phone] (toll free) or use your preferred relay service.     Civil Rights Notice  Discrimination is against the law. [employer] does not discriminate on the basis of any of the following:    Race    Color    National Origin    Creed    Confucianism    Age    Public Assistance Status    Receipt of Health Care Services    Disability (including physical or mental impairment)    Sex (including sex stereotypes and gender identity)    Marital Status    Political Beliefs    Medical Condition    Genetic Information    Sexual Orientation    Claims Experience    Medical History    Health Status    Civil Rights Complaints.   You have the right to file a discrimination complaint if you believe you were treated in a discriminatory way by [employer]. You may contact any of the following four agencies directly to file a discrimination complaint.    U.S. Department of Health and Human Services  Office for Civil Rights (OCR)  You have the right to file a complaint with the OCR, a federal agency,  if you believe you have been discriminated against because of any of the following:    Race    Disability    Color    Sex (including sex stereotypes and gender identity)    National Origin    Age    Contact the OCR directly to file a complaint:         Director         U.S. Department of Health and Human Services  Office for Civil Rights         [address] [place] [address]         [address] [place] [address]         [phone] (Voice)         [phone] (TDD)         Complaint Portal - [email or web address]     [place] Department of Human Rights (MDHR)  In [place], you have the right to file a complaint with the MDHR if you believe you have been discriminated against because of any of the following:      Race    Color    National Origin    Holiness    Creed    Sex    Sexual Orientation    Marital Status    Public Assistance Status    Contact the MD directly to file a complaint:         [place] Department of Human Rights         [address] [address]         [phone] (voice)          [phone] (toll free)         711 or [phone] ([place] Relay)         [phone] (Fax)         [email or web address] (Email)     [place] Department of Human Services (DHS)  You have the right to file a complaint with [facility] if you believe you have been discriminated against in our health care programs because of any of the following:    Race    Color    National Origin    Creed    Holiness    Age    Public Assistance Status    Receipt of Health Care Services    Disability (including physical or mental impairment)    Sex (including sex stereotypes and gender identity)    Marital Status    Political Beliefs    Medical Condition    Genetic Information    Sexual Orientation    Claims Experience    Medical History    Health Status    Complaints must be in writing and filed within 180 days of the date you discovered the  alleged discrimination. The complaint must contain your name and address and describe the discrimination you are complaining about. After we get your complaint, we will review it and notify you in writing about whether we have authority to investigate. If we do, we will investigate the complaint.      [facility] will notify you in writing of the investigation s outcome. You have a right to appeal the outcome if you disagree with the decision. To appeal, you must send a written request to have [facility] review the investigation outcome period. Be brief and state why you disagree with the decision. Include additional information you think is important.      If you file a complaint in this way, the people who work for the agency named in the complaint cannot retaliate against you. This means they cannot punish you in any way for filing a complaint. Filing a complaint in this way does not stop you from seeking out other legal or administration actions.     Contact [facility] directly to file a discrimination complaint:        ATTN: Civil Rights Coordinator        [place] Department of Human [facility]        Equal Opportunity and Access Division        [address] [address]        [place] [address] [phone] (voice) or use your preferred relay service     [facility] Complaint Notice   Contact [facility] directly to file a discrimination complaint:  [facility] Civil Rights Coordinator  Mail Route   [address]  [place] [address] [phone] (voice) or use your preferred relay service  [email or web address]

## 2018-08-01 NOTE — PATIENT INSTRUCTIONS
I will let you know your lab results.   Consider getting the shingles vaccine (Shingrix) at your pharmacy.   Keep taking the same medications for now.

## 2018-08-01 NOTE — PROGRESS NOTES
[facility] Care Coordination Contact  Received after visit chart from care coordinator.  Completed following tasks: Mailed copy of care plan to client, Updated services in access and Entered MMIS  Chart was returned to JASON.   Iona Dhillon  Case Management Specialist  [facility]  [phone]

## 2018-08-01 NOTE — LETTER
August 1, 2018      Aracelis Blakely  [address]   [place] [address]        Dear Ms.Rabeti Blakely,    We are writing to inform you of your test results.      Your lab results are normal,including the liver,kidney,glucose,bone marrow,potassium,and the thyroid level.      Keep taking the same medications.        Resulted Orders   Comprehensive metabolic panel (BMP + Alb, Alk Phos, ALT, AST, Total. Bili, TP)   Result Value Ref Range    Sodium 139 133 - 144 mmol/L    Potassium 4.1 3.4 - 5.3 mmol/L    Chloride 102 94 - 109 mmol/L    Carbon Dioxide 33 (H) 20 - 32 mmol/L    Anion Gap 4 3 - 14 mmol/L    Glucose 96 70 - 99 mg/dL      Comment:      Fasting specimen    Urea Nitrogen 10 7 - 30 mg/dL    Creatinine 0.66 0.52 - 1.04 mg/dL    GFR Estimate 84 >60 mL/min/1.7m2      Comment:      Non  GFR Calc    GFR Estimate If Black >90 >60 mL/min/1.7m2      Comment:       GFR Calc    Calcium 9.2 8.5 - 10.1 mg/dL    Bilirubin Total 1.1 0.2 - 1.3 mg/dL    Albumin 3.9 3.4 - 5.0 g/dL    Protein Total 7.2 6.8 - 8.8 g/dL    Alkaline Phosphatase 57 40 - 150 U/L    ALT 18 0 - 50 U/L    AST 15 0 - 45 U/L   TSH with free T4 reflex   Result Value Ref Range    TSH 1.59 0.40 - 4.00 mU/L   CBC with platelets and differential   Result Value Ref Range    WBC 5.4 4.0 - 11.0 10e9/L    RBC Count 4.62 3.8 - 5.2 10e12/L    Hemoglobin 13.5 11.7 - 15.7 g/dL    Hematocrit 40.4 35.0 - 47.0 %    MCV 87 78 - 100 fl    MCH 29.2 26.5 - 33.0 pg    MCHC 33.4 31.5 - 36.5 g/dL    RDW 11.9 10.0 - 15.0 %    Platelet Count 220 150 - 450 10e9/L    Diff Method Automated Method     % Neutrophils 57.6 %    % Lymphocytes 27.4 %    % Monocytes 10.3 %    % Eosinophils 4.3 %    % Basophils 0.4 %    Absolute Neutrophil 3.1 1.6 - 8.3 10e9/L    Absolute Lymphocytes 1.5 0.8 - 5.3 10e9/L    Absolute Monocytes 0.6 0.0 - 1.3 10e9/L    Absolute Eosinophils 0.2 0.0 - 0.7 10e9/L    Absolute Basophils 0.0 0.0 - 0.2 10e9/L       If  you have any questions or concerns, please call the clinic at the number listed above.       Sincerely,        Truman Plama MD

## 2018-08-01 NOTE — PROGRESS NOTES
"SUBJECTIVE:   Aracelis Blakely is a 89 year old female who presents for Preventive Visit.      Are you in the first 12 months of your Medicare coverage?  No    Physical   Annual:     Getting at least 3 servings of Calcium per day:  Yes    Bi-annual eye exam:  Yes    Dental care twice a year:  NO    Sleep apnea or symptoms of sleep apnea:  Sleep apnea    Diet:  Regular (no restrictions)    Frequency of exercise:  None    Taking medications regularly:  Yes    Medication side effects:  None    Additional concerns today:  No    Ability to successfully perform activities of daily living: housework requires assistance    Home Safety:  No safety concerns identified    Hearing Impairment: no hearing concerns        Fall risk:       COGNITIVE SCREEN  1) Repeat 3 items (Leader, Season, Table)    2) Clock draw: NORMAL  3) 3 item recall: Recalls 3 objects  Results: 3 items recalled: COGNITIVE IMPAIRMENT LESS LIKELY    Mini-CogTM Copyright S De. Licensed by the author for use in [facility]; reprinted with permission ([email or web address]). All rights reserved.        Reviewed and updated as needed this visit by clinical staff  Tobacco  Allergies  Meds  Med Hx  Surg Hx  Fam Hx  Soc Hx        Reviewed and updated as needed this visit by Provider        Social History   Substance Use Topics     Smoking status: Never Smoker     Smokeless tobacco: Never Used     Alcohol use No       No flowsheet data found.                 Here with the .                  Requesting \"an annual physical\".                   Fasting \"for too long\" today; (>12 hours).   Wants labs.                   Home -140 range per pt.               Wants compression stockings; varicose veins.                 Cough is much better; see 12/17 note. Wants a cough suppressant Rx for prn use.                  Today's PHQ-2 Score:   PHQ-2 ( 1999 Pfizer) 12/30/2011   Q1: Little interest or pleasure in doing things 0   Q2: Feeling down, " depressed or hopeless 0   PHQ-2 Score 0       Do you feel safe in your environment - Yes    Do you have a Health Care Directive?: No: Advance care planning reviewed with patient; information given to patient to review.    Current providers sharing in care for this patient include:   Patient Care Team:  Truman Palma MD as PCP - General (Internal Medicine)  Flora Aquino LSW as Lead Care Coordinator (Primary Care - CC)  Mickie Thomas as Case Management Specialist (Primary Care - CC)  Sierra Solano as Case Management Specialist (Primary Care - CC)    The following health maintenance items are reviewed in Epic and correct as of today:  Health Maintenance   Topic Date Due     DEPRESSION ACTION PLAN Q1 YR  1940     ADVANCE DIRECTIVE PLANNING Q5 YRS  1977     PHQ-9 Q6 MONTHS  2018     INFLUENZA VACCINE (1) 2018     FALL RISK ASSESSMENT  2019     TETANUS IMMUNIZATION (SYSTEM ASSIGNED)  2024     PNEUMOCOCCAL  Completed     Patient Active Problem List    Diagnosis Date Noted     Chronic cough 2018     Priority: Medium     CXR neg in        Constipation, unspecified constipation type 2018     Priority: Medium     Depression, unspecified depression type 2018     Priority: Medium     controlled with sertraline       Health Care Home 2018     Priority: Medium     [facility] Care Coordinator  MARTA Montoya  [phone]    [facility] CARE PLAN SUMMARY    Member Name:  Aracelis Blakely  Address:  [address] [place] [id number] Phone: [phone] (home)    :  3/21/1922 Date of Assessment:  2018   Health Plan:  [employer] [facility]  Health Plan Number: [id number] Medical Assistance Number: [id number]  Financial Worker: Team [place]   Case #:  [id number]    [facility] Care Coordinator:  MARTA Montoya CC Phone:  [phone]  CC Fax:  [phone]   [facility] Enrollment Date: 2018 Case Mix: H   Rate Cell:  A  Waiver  "Type:  None   Primary Emergency Contact:   Yoselin Gonzalez  Home Phone: [phone]  Mobile Phone: [phone]  Relation: Daughter    Secondary Emergency Contact:   Corbin Islas  Home Phone: [phone]  Relation: Son In-Law (Yoselin's Spouse) Language:  Farsi   :  Yes  : Blanca Page   [phone]    Unable to read or write. Can read/write some words in Farsi.   Health Care Agent/POA:   Advanced Directives/Living Will:     Primary Care Clinic/Phone/Fax:  [facility]/(p) [phone], (f) [phone] Primary Dx:  Osteoporosis without current pathological fracture, unspecified osteoporosis type (M81.0)  Secondary Dx: Hypertension, unspecified type (I10)   Primary Physician:  Truman Palma   Height:  5' 2\"  Weight:  159 lbs   Specialty Physician:    Audiologist:  Has hearing aides that she chooses not to wear, because they are uncomfortable.   Eye Care Provider: Bifocal Eye Glasses for reading    [facility]  Dr. Vadim Seo MD, MMM, FACS OPHTHALMOLOGIST  Phone: [phone]    Email: [email or web address]  Address: [address] [place] [address] Dental Care Provider:  [facility] Lower [facility]  [facility]: [facility] [phone]   Other:        [facility] CURRENT SERVICES SUMMARY  Equipment owned/DME history: Cane, 4WW Walker, Regular Cane,  Bed Grab Bar, Medical Phone Alert, Shower Chair, Hand Held Shower  Supplemental oxygen as needed during the day and night, nebulizer treatment as needed     SERVICE TYPE/PROVIDER NAME/PHONE AUTH DATE FREQUENCY Units OR $ Amt DESCRIPTION   Medical Transportation: [facility] [phone]   8/1/18-7/31/19 As Needed Covered by [employer]    PCA: [facility] [phone]   8/1/18-7/31/19 10.5 Hours Daily $4.28/Unit = $17.12/Hour $5,467.70 Monthly   PCA Supervision: [facility]                                [phone]      8/1/18-7/31/19 2 Hours Every Other Month $7.52/Unit = $30.08/Hour " $30.08 Monthly   Supplies: [facility] [phone]  Incontinent Supplies 8/1/18-7/31/19 4-5 Per Day Delivered Monthly Covered by MA                   Insomnia, unspecified type 12/12/2017     Priority: Medium     Unable to find patient on  3/28/18                           Chronic zolpidem use; BIW       Hypertension, unspecified type 12/12/2017     Priority: Medium     Gastroesophageal reflux disease without esophagitis 12/12/2017     Priority: Medium     Long term use of PPI       Osteoporosis without current pathological fracture, unspecified osteoporosis type 12/12/2017     Priority: Medium     Rx alendronate; since ? ; since 2016 or 2017?                CXR 12/17 shows Mild  kyphosis lower thoracic spine is noted. No vertebral body compression  fracture is evident.       Past Surgical History:   Procedure Laterality Date     CHOLECYSTECTOMY       RELEASE CARPAL TUNNEL Right 6/13/2016     Social History     Social History     Marital status:      Spouse name: N/A     Number of children: 7     Years of education: 1st Grade     Occupational History     Homemaker      Social History Main Topics     Smoking status: Never Smoker     Smokeless tobacco: Never Used     Alcohol use No     Drug use: No     Sexual activity: No     Other Topics Concern     Not on file     Social History Narrative    7/26/18: Was [place]. Grew up with three sisters and three brothers. Only has one living brother who [place]. Went to school through the first grade. Unable to read or write. Can read/write some words in Farsi. Has never worked. Had seven children. One daughter was killed in an accident. Three grandchildren. Four great grandchildren. No great-great grandchildren. Was  for forty years.  was 26 years older than Aracelis and passed away when she was 48. Added by MARTA Montoya. [employer].                                                                          3 daughters in the [place].                   Immunization History   Administered Date(s) Administered     Influenza (High Dose) 3 valent vaccine 10/15/2017     Influenza (IIV3) PF 09/19/2012, 09/27/2013, 10/20/2014, 10/29/2015, 11/18/2016     Pneumo Conj 13-V (2010&after) 11/13/2015     Pneumococcal 23 valent 10/15/2008     TDAP Vaccine (Adacel) 01/08/2014     Zoster vaccine, live 12/07/2009, 08/28/2013       BP Readings from Last 3 Encounters:   08/01/18 134/74   12/12/17 110/70   09/17/16 (!) 172/121    Wt Readings from Last 3 Encounters:   08/01/18 159 lb (72.1 kg)   12/12/17 159 lb (72.1 kg)   09/17/16 160 lb (72.6 kg)                  Current Outpatient Prescriptions   Medication Sig Dispense Refill     acetaminophen (TYLENOL) 325 MG tablet Take 325-650 mg by mouth       albuterol (2.5 MG/3ML) 0.083% neb solution Inhale 2.5 mg into the lungs       albuterol (ACCUNEB) 0.63 MG/3ML nebulizer solution Inhale 0.63 mg into the lungs       alendronate (FOSAMAX) 70 MG tablet Take 1 tablet (70 mg) by mouth every 7 days 12 tablet 3     amLODIPine (NORVASC) 2.5 MG tablet Take 1 tablet (2.5 mg) by mouth daily 90 tablet 3     aspirin 81 MG EC tablet Take 1 tablet (81 mg) by mouth daily 90 tablet 3     azelastine (OPTIVAR) 0.05 % SOLN ophthalmic solution Place 1 drop into both eyes 2 times daily       Calcium carb-Vitamin D 500 mg [place]-200 units (OSCAL WITH D;OYSTER SHELL CALCIUM) 500-200 MG-UNIT per tablet Take 1 tablet by mouth 2 times daily (with meals) 180 tablet 3     cetirizine (ZYRTEC) 10 MG tablet Take 10 mg by mouth       cholecalciferol (VITAMIN D3) 1000 UNIT tablet Take by mouth daily       Cyanocobalamin (B-12) 100 MCG TABS        guaiFENesin-dextromethorphan (ROBITUSSIN DM) 100-10 MG/5ML syrup Take 5 mLs by mouth 4 times daily as needed for cough 354 mL 3     Multiple Vitamin (DAILY NETTA) TABS Take 1 tablet by mouth daily 90 tablet 3     Omega-3 Fatty Acids (OMEGA-3 FISH OIL PO)         "omeprazole 20 MG tablet Take 1 tablet (20 mg) by mouth daily 90 tablet 3     order for DME Equipment being ordered:  Compression stockings,knee high,mild compression 3 Units 3     order for DME Knee high compression stockings.  Light compression.  16-20mm Hg.  Ok 3 pairs.       sennosides (SENOKOT) 8.6 MG tablet Take 3 tablets by mouth daily 270 tablet 3     sertraline (ZOLOFT) 50 MG tablet Take 1 tablet (50 mg) by mouth daily 90 tablet 3     zolpidem (AMBIEN) 5 MG tablet Take 0.5 tablets (2.5 mg) by mouth nightly as needed for sleep 15 tablet 1     cyanocobalamin 1000 MCG TABS        ketotifen (ZADITOR) 0.025 % SOLN ophthalmic solution 1 drop       No Known Allergies        Review of Systems  CONSTITUTIONAL: NEGATIVE for fever, chills, change in weight  INTEGUMENTARY/SKIN: NEGATIVE for worrisome rashes, moles or lesions  EYES: NEGATIVE for vision changes or irritation  ENT/MOUTH: NEGATIVE for ear, mouth and throat problems  RESP: NEGATIVE for significant cough or SOB  BREAST: NEGATIVE for masses, tenderness or discharge  CV: NEGATIVE for chest pain/chest pressure and palpitations  GI: NEGATIVE for hematochezia and melena   female: leakage  MUSCULOSKELETAL: NEGATIVE for significant arthralgias or myalgia  NEURO: NEGATIVE for weakness, dizziness or paresthesias  ENDOCRINE: NEGATIVE for temperature intolerance, skin/hair changes  HEME: NEGATIVE for bleeding problems  PSYCHIATRIC: NEGATIVE for changes in mood or affect    OBJECTIVE:   /74  Pulse 93  Resp 20  Ht 5' 2\" (1.575 m)  Wt 159 lb (72.1 kg)  SpO2 92%  Breastfeeding? No  BMI 29.08 kg/m2 Estimated body mass index is 29.08 kg/(m^2) as calculated from the following:    Height as of this encounter: 5' 2\" (1.575 m).    Weight as of this encounter: 159 lb (72.1 kg).  Physical Exam  GENERAL APPEARANCE: healthy, alert and no distress  EYES: Eyes grossly normal to inspection  HENT: ear canals and TM's normal, nose and mouth without ulcers or lesions, " oropharynx clear and oral mucous membranes moist  HENT: dentures  NECK: no adenopathy, no asymmetry, masses, or scars and thyroid normal to palpation  RESP: lungs clear to auscultation - no rales, rhonchi or wheezes  BREAST: normal without masses, tenderness or nipple discharge and no palpable axillary masses or adenopathy  CV: regular rates and rhythm, normal S1 S2, no S3 or S4, no murmur, click or rub, peripheral pulses strong and varicosities with mild edema  ABDOMEN: soft, nontender, no hepatosplenomegaly and no masses  MS: spine/back exam reveals mild kyphosis  SKIN: no suspicious lesions or rashes  NEURO: Normal strength and tone, mentation intact and speech normal  PSYCH: mentation appears normal and affect normal/bright    Diagnostic Test Results:  pending    ASSESSMENT / PLAN:   Aracelis was seen today for physical.    Diagnoses and all orders for this visit:    Routine general medical examination at a health care facility    Hypertension, unspecified type  -     Comprehensive metabolic panel (BMP + Alb, Alk Phos, ALT, AST, Total. Bili, TP)    Osteoporosis without current pathological fracture, unspecified osteoporosis type  -     Comprehensive metabolic panel (BMP + Alb, Alk Phos, ALT, AST, Total. Bili, TP)  -     TSH with free T4 reflex    Depression, unspecified depression type    Constipation, unspecified constipation type    Insomnia, unspecified type    Gastroesophageal reflux disease without esophagitis  -     CBC with platelets and differential    Varicose veins of both lower extremities  -     order for DME; Equipment being ordered:  Compression stockings,knee high,mild compression    Chronic cough  Comments:  CXR neg in 12/17  Orders:  -     guaiFENesin-dextromethorphan (ROBITUSSIN DM) 100-10 MG/5ML syrup; Take 5 mLs by mouth 4 times daily as needed for cough        Summary and implications:       Very good health overall at age 89.        Seems to tolerate all her meds, including  "zolpidem,sertraline,alendronate,etc.      Check labs.          Ok for support stockings.        Ok for prn cough suppressant.   Patient Instructions   I will let you know your lab results.   Consider getting the shingles vaccine (Shingrix) at your pharmacy.   Keep taking the same medications for now.          End of Life Planning:  Patient currently has an advanced directive: No.  I have verified the patient's ablity to prepare an advanced directive/make health care decisions.  Literature was provided to assist patient in preparing an advanced directive.    COUNSELING:  Reviewed preventive health counseling, as reflected in patient instructions  Special attention given to:       Regular exercise       Healthy diet/nutrition    BP Readings from Last 1 Encounters:   08/01/18 134/74     Estimated body mass index is 29.08 kg/(m^2) as calculated from the following:    Height as of this encounter: 5' 2\" (1.575 m).    Weight as of this encounter: 159 lb (72.1 kg).           reports that she has never smoked. She has never used smokeless tobacco.      Appropriate preventive services were discussed with this patient, including applicable screening as appropriate for cardiovascular disease, diabetes, osteopenia/osteoporosis, and glaucoma.  As appropriate for age/gender, discussed screening for colorectal cancer, prostate cancer, breast cancer, and cervical cancer. Checklist reviewing preventive services available has been given to the patient.    Reviewed patients plan of care and provided an AVS. The Basic Care Plan (routine screening as documented in Health Maintenance) for Aracelis meets the Care Plan requirement. This Care Plan has been established and reviewed with the Patient and the .    Counseling Resources:  ATP IV Guidelines  Pooled Cohorts Equation Calculator  Breast Cancer Risk Calculator  FRAX Risk Assessment  ICSI Preventive Guidelines  Dietary Guidelines for Americans, 2010  USDA's MyPlate  ASA " Prophylaxis  Lung CA Screening    Truman Palma MD  [facility]   Results for orders placed or performed in visit on 08/01/18   Comprehensive metabolic panel (BMP + Alb, Alk Phos, ALT, AST, Total. Bili, TP)   Result Value Ref Range    Sodium 139 133 - 144 mmol/L    Potassium 4.1 3.4 - 5.3 mmol/L    Chloride 102 94 - 109 mmol/L    Carbon Dioxide 33 (H) 20 - 32 mmol/L    Anion Gap 4 3 - 14 mmol/L    Glucose 96 70 - 99 mg/dL    Urea Nitrogen 10 7 - 30 mg/dL    Creatinine 0.66 0.52 - 1.04 mg/dL    GFR Estimate 84 >60 mL/min/1.7m2    GFR Estimate If Black >90 >60 mL/min/1.7m2    Calcium 9.2 8.5 - 10.1 mg/dL    Bilirubin Total 1.1 0.2 - 1.3 mg/dL    Albumin 3.9 3.4 - 5.0 g/dL    Protein Total 7.2 6.8 - 8.8 g/dL    Alkaline Phosphatase 57 40 - 150 U/L    ALT 18 0 - 50 U/L    AST 15 0 - 45 U/L   TSH with free T4 reflex   Result Value Ref Range    TSH 1.59 0.40 - 4.00 mU/L   CBC with platelets and differential   Result Value Ref Range    WBC 5.4 4.0 - 11.0 10e9/L    RBC Count 4.62 3.8 - 5.2 10e12/L    Hemoglobin 13.5 11.7 - 15.7 g/dL    Hematocrit 40.4 35.0 - 47.0 %    MCV 87 78 - 100 fl    MCH 29.2 26.5 - 33.0 pg    MCHC 33.4 31.5 - 36.5 g/dL    RDW 11.9 10.0 - 15.0 %    Platelet Count 220 150 - 450 10e9/L    Diff Method Automated Method     % Neutrophils 57.6 %    % Lymphocytes 27.4 %    % Monocytes 10.3 %    % Eosinophils 4.3 %    % Basophils 0.4 %    Absolute Neutrophil 3.1 1.6 - 8.3 10e9/L    Absolute Lymphocytes 1.5 0.8 - 5.3 10e9/L    Absolute Monocytes 0.6 0.0 - 1.3 10e9/L    Absolute Eosinophils 0.2 0.0 - 0.7 10e9/L    Absolute Basophils 0.0 0.0 - 0.2 10e9/L     Letter sent.            Your lab results are normal,including the liver,kidney,glucose,bone marrow,potassium,and the thyroid level.      Keep taking the same medications.

## 2018-08-01 NOTE — MR AVS SNAPSHOT
After Visit Summary   8/1/2018    Aracelis Blakely    MRN: [id number]           Patient Information     Date Of Birth          3/21/1922        Visit Information        Provider Department      8/1/2018 10:00 AM [facility] Pal[facility]        Today's Diagnoses     Routine general medical examination at a health care facility    -  1    Hypertension, unspecified type        Osteoporosis without current pathological fracture, unspecified osteoporosis type        Depression, unspecified depression type        Constipation, unspecified constipation type        Insomnia, unspecified type        Gastroesophageal reflux disease without esophagitis        Varicose veins of both lower extremities        Chronic cough          Care Instructions    I will let you know your lab results.   Consider getting the shingles vaccine (Shingrix) at your pharmacy.   Keep taking the same medications for now.            Follow-ups after your visit        Who to contact     If you have questions or need follow up information about today's clinic visit or your schedule please contact [facility] directly at [phone].  Normal or non-critical lab and imaging results will be communicated to you by MyChart, letter or phone within 4 business days after the clinic has received the results. If you do not hear from us within 7 days, please contact the clinic through MyChart or phone. If you have a critical or abnormal lab result, we will notify you by phone as soon as possible.  Submit refill requests through [employer] or call your pharmacy and they will forward the refill request to us. Please allow 3 business days for your refill to be completed.          Additional Information About Your Visit        Care EveryWhere ID     This is your Care EveryWhere ID. This could be used by other organizations to access your [facility]  "records  [id number]        Your Vitals Were     Pulse Respirations Height Pulse Oximetry Breastfeeding? BMI (Body Mass Index)    93 20 5' 2\" (1.575 m) 92% No 29.08 kg/m2       Blood Pressure from Last 3 Encounters:   08/01/18 134/74   12/12/17 110/70   09/17/16 (!) 172/121    Weight from Last 3 Encounters:   08/01/18 159 lb (72.1 kg)   12/12/17 159 lb (72.1 kg)   09/17/16 160 lb (72.6 kg)              We Performed the Following     CBC with platelets and differential     Comprehensive metabolic panel (BMP + Alb, Alk Phos, ALT, AST, Total. Bili, TP)     TSH with free T4 reflex          Today's Medication Changes          These changes are accurate as of 8/1/18 10:50 AM.  If you have any questions, ask your nurse or doctor.               Start taking these medicines.        Dose/Directions    order for DME   Used for:  Varicose veins of both lower extremities   Started by:  Truman Palma MD        Equipment being ordered:  Compression stockings,knee high,mild compression   Quantity:  3 Units   Refills:  3            Where to get your medicines      Some of these will need a paper prescription and others can be bought over the counter.  Ask your nurse if you have questions.     Bring a paper prescription for each of these medications     guaiFENesin-dextromethorphan 100-10 MG/5ML syrup    order for DME                Primary Care Provider Office Phone # Fax #    Truman Palma [phone] [address] [facility] [address]        Equal Access to Services     [facility] SHELLEY AH: Hadii aad ku hadasho Soomaali, waaxda luqadaha, qaybta kaalmada adeegyada, waxay idiin hayaan adeveto hines. So [facility] [phone].    ATENCIÓN: Si habla español, tiene a bravo disposición servicios gratuitos de asistencia lingüística. Llame al [phone].    We comply with applicable federal civil rights laws and [place] laws. We do not discriminate on the basis of race, color, national origin, age, " disability, sex, sexual orientation, or gender identity.            Thank you!     Thank you for choosing [facility]  for your care. Our goal is always to provide you with excellent care. Hearing back from our patients is one way we can continue to improve our services. Please take a few minutes to complete the written survey that you may receive in the mail after your visit with us. Thank you!             Your Updated Medication List - Protect others around you: Learn how to safely use, store and throw away your medicines at www.disposemymeds.org.          This list is accurate as of 8/1/18 10:50 AM.  Always use your most recent med list.                   Brand Name Dispense Instructions for use Diagnosis    acetaminophen 325 MG tablet    TYLENOL     Take 325-650 mg by mouth        * albuterol (2.5 MG/3ML) 0.083% neb solution      Inhale 2.5 mg into the lungs        * albuterol 0.63 MG/3ML nebulizer solution    ACCUNEB     Inhale 0.63 mg into the lungs        alendronate 70 MG tablet    FOSAMAX    12 tablet    Take 1 tablet (70 mg) by mouth every 7 days    Osteoporosis without current pathological fracture, unspecified osteoporosis type       amLODIPine 2.5 MG tablet    NORVASC    90 tablet    Take 1 tablet (2.5 mg) by mouth daily    Hypertension, unspecified type       aspirin 81 MG EC tablet     90 tablet    Take 1 tablet (81 mg) by mouth daily    Hypertension, unspecified type       azelastine 0.05 % Soln ophthalmic solution    OPTIVAR     Place 1 drop into both eyes 2 times daily        * B-12 100 MCG Tabs           * cyanocobalamin 1000 MCG Tabs           Calcium carb-Vitamin D 500 mg [place]-200 units 500-200 MG-UNIT per tablet    OSCAL with D;Oyster Shell Calcium    180 tablet    Take 1 tablet by mouth 2 times daily (with meals)        cetirizine 10 MG tablet    zyrTEC     Take 10 mg by mouth        cholecalciferol 1000 UNIT tablet    vitamin D3     Take by mouth daily         DAILY NETTA Tabs     90 tablet    Take 1 tablet by mouth daily    Osteoporosis without current pathological fracture, unspecified osteoporosis type       guaiFENesin-dextromethorphan 100-10 MG/5ML syrup    ROBITUSSIN DM    354 mL    Take 5 mLs by mouth 4 times daily as needed for cough    Chronic cough       OMEGA-3 FISH OIL PO           omeprazole 20 MG tablet     90 tablet    Take 1 tablet (20 mg) by mouth daily    Gastroesophageal reflux disease without esophagitis       order for DME      Knee high compression stockings.  Light compression.  16-20mm Hg.  Ok 3 pairs.        order for DME     3 Units    Equipment being ordered:  Compression stockings,knee high,mild compression    Varicose veins of both lower extremities       sennosides 8.6 MG tablet    SENOKOT    270 tablet    Take 3 tablets by mouth daily    Constipation, unspecified constipation type       sertraline 50 MG tablet    ZOLOFT    90 tablet    Take 1 tablet (50 mg) by mouth daily    Depression, unspecified depression type       ZADITOR 0.025 % Soln ophthalmic solution   Generic drug:  ketotifen      1 drop        zolpidem 5 MG tablet    AMBIEN    15 tablet    Take 0.5 tablets (2.5 mg) by mouth nightly as needed for sleep    Insomnia, unspecified type       * Notice:  This list has 4 medication(s) that are the same as other medications prescribed for you. Read the directions carefully, and ask your doctor or other care provider to review them with you.

## 2018-08-17 ENCOUNTER — PATIENT OUTREACH (OUTPATIENT)
Dept: GERIATRIC MEDICINE | Facility: CLINIC | Age: 83
End: 2018-08-17

## 2018-08-17 NOTE — PROGRESS NOTES
[facility] Care Coordination Contact  Communication History     User: Flora Aquino LSW Date/time: 8/17/2018  8:46 AM    Context:  Outcome: Left Message    Phone number: [phone] Phone Type:     Comm. type: Telephone Call type: Outgoing    Contact: [facility] Care: Zora Relation to patient: Other    User: Flora Aqiuno LSW Date/time: 8/17/2018  8:45 AM    Context:  Outcome:     Phone number: [phone] Phone Type:     Comm. type: Fax Call type: Outgoing    Contact: [facility] Care Coordinators  Relation to patient: Other    User: Flora Aquino LSW Date/time: 8/17/2018  8:33 AM    Context:  Outcome:     Phone number: [phone] Phone Type:     Comm. type: Telephone Call type: Incoming    Contact: [facility] Care: Zora Relation to patient: Other        Received message from Zora at [facility] stating that there was a gap in [facility]'s PCA services. They didn't have PCA authorization for 7/17/18-7/31/18.    Faxed second request to [employer] requesting that PCA services start on 7/17/18 instead of 8/1/18.    Left message for Zora letting her know that writer faxed a second request to [facility].    MARTA Montoya  [facility]  [phone]  Fax: [phone]

## 2018-08-31 DIAGNOSIS — G47.00 INSOMNIA, UNSPECIFIED TYPE: ICD-10-CM

## 2018-08-31 RX ORDER — ZOLPIDEM TARTRATE 5 MG/1
TABLET ORAL
Qty: 15 TABLET | Refills: 3 | Status: SHIPPED | OUTPATIENT
Start: 2018-08-31 | End: 2019-05-10

## 2018-08-31 NOTE — TELEPHONE ENCOUNTER
I am out of the office.            This refill is ok with me.           Please have one of the other providers sign it.

## 2018-08-31 NOTE — TELEPHONE ENCOUNTER
ZOLPIDEM TARTRATE 5 MG TABLET      Last Written Prescription Date:  3/28/2018  Last Fill Quantity: 15,   # refills: 1  Last Office Visit: 8/1/2018  Future Office visit:       Routing refill request to provider for review/approval because:  Drug not on the [facility], [facility] or [facility] refill protocol or controlled substance

## 2018-11-12 DIAGNOSIS — F32.A DEPRESSION, UNSPECIFIED DEPRESSION TYPE: ICD-10-CM

## 2018-11-12 NOTE — LETTER
December 4, 2018    Aracelis Blakely  [address]   [place] [address]    Dear Monica Hsu cares about your health and your health plan.  I have reviewed your medical conditions, medication list and lab results, and am making recommendations based on this review to better manage your health.    You are in particular need of attention regarding:  -Depression/Anxiety    I am recommending that you:     Please complete the enclosed PHQ9 and mail back to clinic in the envelope provided.         Please call us at the [facility] location:  [phone] or use [employer] to address the above recommendations.     Thank you for trusting [facility].  We appreciate the opportunity to serve you and look forward to supporting your healthcare in the future.    If you have (or plan to have) any of these tests done at a facility other than a [facility] or a [facility], please have the results sent to the [facility] location noted above.      Best Regards,    Truman Palma MD

## 2018-11-12 NOTE — TELEPHONE ENCOUNTER
"Requested Prescriptions   Pending Prescriptions Disp Refills     sertraline (ZOLOFT) 50 MG tablet [Pharmacy Med Name: SERTRALINE HCL 50 MG TABLET] 90 tablet 3      Last Written Prescription Date:  12/12/17  Last Fill Quantity: 90,  # refills: 3   Last office visit: 8/1/2018 with prescribing provider:     Future Office Visit:     Sig: TAKE 1 TABLET BY MOUTH DAILY    SSRIs Protocol Failed    11/12/2018 10:57 AM       Failed - PHQ-9 score less than 5 in past 6 months    Please review last PHQ-9 score.          Passed - Patient is age 18 or older       Passed - No active pregnancy on record       Passed - No positive pregnancy test in last 12 months       Passed - Recent (6 mo) or future (30 days) visit within the authorizing provider's specialty    Patient had office visit in the last 6 months or has a visit in the next 30 days with authorizing provider or within the authorizing provider's specialty.  See \"Patient Info\" tab in inbasket, or \"Choose Columns\" in Meds & Orders section of the refill encounter.            PHQ-9 SCORE 12/12/2017   Total Score MyChart 4 (Minimal depression)   Total Score 4       "

## 2018-12-04 DIAGNOSIS — M81.0 OSTEOPOROSIS WITHOUT CURRENT PATHOLOGICAL FRACTURE, UNSPECIFIED OSTEOPOROSIS TYPE: ICD-10-CM

## 2018-12-04 NOTE — TELEPHONE ENCOUNTER
"Requested Prescriptions   Pending Prescriptions Disp Refills     alendronate (FOSAMAX) 70 MG tablet [Pharmacy Med Name: ALENDRONATE SODIUM 70 MG TAB]  Last Written Prescription Date:  12/12/2017  Last Fill Quantity: 12 tablet,  # refills: 3   Last office visit: 8/1/2018 with prescribing provider:  Louie   Future Office Visit:     12 tablet 0     Sig: TAKE 1 TAB BY MOUTH ONCE A WEEK IN THE AM. TAKE ON EMPTY STOMACH WITH WATER. DONT LIE DOWN FOR 1 HR    Bisphosphonates Failed    12/4/2018  9:46 AM       Failed - Dexa on file within past 2 years    Please review last Dexa result.          Passed - Recent (12 mo) or future (30 days) visit within the authorizing provider's specialty    Patient had office visit in the last 12 months or has a visit in the next 30 days with authorizing provider or within the authorizing provider's specialty.  See \"Patient Info\" tab in inbasket, or \"Choose Columns\" in Meds & Orders section of the refill encounter.             Passed - Patient is age 18 or older       Passed - Normal serum creatinine on file within past 12 months    Recent Labs   Lab Test  08/01/18   1056   CR  0.66                "

## 2018-12-05 ENCOUNTER — PATIENT OUTREACH (OUTPATIENT)
Dept: GERIATRIC MEDICINE | Facility: CLINIC | Age: 83
End: 2018-12-05

## 2018-12-05 RX ORDER — ALENDRONATE SODIUM 70 MG/1
TABLET ORAL
Qty: 12 TABLET | Refills: 3 | Status: SHIPPED | OUTPATIENT
Start: 2018-12-05 | End: 2019-08-20

## 2018-12-05 NOTE — PROGRESS NOTES
[facility] Care Coordination Contact    First Attempt:    Called member to complete six month assessment and left a message requesting a return call.    MARTA Lino  [facility]  [phone]  Fax: [phone]

## 2018-12-05 NOTE — TELEPHONE ENCOUNTER
Routing refill request to provider for review/approval because:  Dexa scan not on file within past 2 years

## 2019-01-03 NOTE — PROGRESS NOTES
[facility] Care Coordination Contact      [facility] Six-Month Telephone Assessment    6 month telephone assessment completed on 01-.    ER visits: No  Hospitalizations: No  TCU stays: No  Significant health status changes: None  Falls/Injuries: No  ADL/IADL changes: No  Changes in services: No    Caregiver Assessment follow up:  NA    Goals: See POC in chart for goal progress documentation.      Will see member in 6 months for an annual health risk assessment.   Encouraged member to call CC with any questions or concerns in the meantime.     MARTA Lino  [facility]  [phone]  Fax: [phone]

## 2019-01-14 ENCOUNTER — OFFICE VISIT (OUTPATIENT)
Dept: URGENT CARE | Facility: URGENT CARE | Age: 84
End: 2019-01-14
Payer: COMMERCIAL

## 2019-01-14 VITALS
HEART RATE: 82 BPM | OXYGEN SATURATION: 93 % | DIASTOLIC BLOOD PRESSURE: 77 MMHG | TEMPERATURE: 97 F | SYSTOLIC BLOOD PRESSURE: 150 MMHG

## 2019-01-14 DIAGNOSIS — R50.9 FEVER AND CHILLS: Primary | ICD-10-CM

## 2019-01-14 LAB
FLUAV+FLUBV AG SPEC QL: NEGATIVE
FLUAV+FLUBV AG SPEC QL: NEGATIVE
SPECIMEN SOURCE: NORMAL

## 2019-01-14 PROCEDURE — 87804 INFLUENZA ASSAY W/OPTIC: CPT | Performed by: FAMILY MEDICINE

## 2019-01-14 PROCEDURE — 99213 OFFICE O/P EST LOW 20 MIN: CPT | Performed by: FAMILY MEDICINE

## 2019-01-14 RX ORDER — ONDANSETRON 4 MG/1
4 TABLET, ORALLY DISINTEGRATING ORAL EVERY 8 HOURS PRN
Qty: 18 TABLET | Refills: 0 | Status: SHIPPED | OUTPATIENT
Start: 2019-01-14 | End: 2019-01-21

## 2019-01-14 NOTE — PROGRESS NOTES
SUBJECTIVE:   Aracelis Blakely is a 89 year old female presenting with a chief complaint of chills and nausea.  Onset of symptoms was 1 day(s) ago.  Course of illness is waxing and waning.    Severity moderate  Current and Associated symptoms: body aches  Treatment measures tried include Tylenol/Ibuprofen.  Predisposing factors include advanced age.  + flu shot.    Past Medical History:   Diagnosis Date     Hypertension      Current Outpatient Medications   Medication Sig Dispense Refill     acetaminophen (TYLENOL) 325 MG tablet Take 325-650 mg by mouth       albuterol (2.5 MG/3ML) 0.083% neb solution Inhale 2.5 mg into the lungs       albuterol (ACCUNEB) 0.63 MG/3ML nebulizer solution Inhale 0.63 mg into the lungs       alendronate (FOSAMAX) 70 MG tablet TAKE 1 TAB BY MOUTH ONCE A WEEK IN THE AM. TAKE ON EMPTY STOMACH WITH WATER. DONT LIE DOWN FOR 1 HR 12 tablet 3     amLODIPine (NORVASC) 2.5 MG tablet Take 1 tablet (2.5 mg) by mouth daily 90 tablet 3     aspirin 81 MG EC tablet Take 1 tablet (81 mg) by mouth daily 90 tablet 3     azelastine (OPTIVAR) 0.05 % SOLN ophthalmic solution Place 1 drop into both eyes 2 times daily       Calcium carb-Vitamin D 500 mg [place]-200 units (OSCAL WITH D;OYSTER SHELL CALCIUM) 500-200 MG-UNIT per tablet Take 1 tablet by mouth 2 times daily (with meals) 180 tablet 3     cetirizine (ZYRTEC) 10 MG tablet Take 10 mg by mouth       cholecalciferol (VITAMIN D3) 1000 UNIT tablet Take by mouth daily       Cyanocobalamin (B-12) 100 MCG TABS        cyanocobalamin 1000 MCG TABS        guaiFENesin-dextromethorphan (ROBITUSSIN DM) 100-10 MG/5ML syrup Take 5 mLs by mouth 4 times daily as needed for cough 354 mL 3     ketotifen (ZADITOR) 0.025 % SOLN ophthalmic solution 1 drop       Multiple Vitamin (DAILY NETTA) TABS Take 1 tablet by mouth daily 90 tablet 3     Omega-3 Fatty Acids (OMEGA-3 FISH OIL PO)        omeprazole 20 MG tablet Take 1 tablet (20 mg) by mouth daily 90 tablet 3     order  for DME Equipment being ordered:  Compression stockings,knee high,mild compression 3 Units 3     order for DME Knee high compression stockings.  Light compression.  16-20mm Hg.  Ok 3 pairs.       sennosides (SENOKOT) 8.6 MG tablet Take 3 tablets by mouth daily 270 tablet 3     sertraline (ZOLOFT) 50 MG tablet TAKE 1 TABLET BY MOUTH DAILY 90 tablet 0     zolpidem (AMBIEN) 5 MG tablet TAKE ONE-HALF TABLET BY MOUTH AT BEDTIME AS NEEDED FOR SLEEP 15 tablet 3     Social History     Tobacco Use     Smoking status: Never Smoker     Smokeless tobacco: Never Used   Substance Use Topics     Alcohol use: No       ROS:  INTEGUMENTARY/SKIN: NEGATIVE for worrisome rashes, moles or lesions  EYES: NEGATIVE for vision changes or irritation    OBJECTIVE:  /77   Pulse 82   Temp 97  F (36.1  C) (Oral)   SpO2 93%   GENERAL APPEARANCE: healthy, alert and no distress  EYES: EOMI,  PERRL, conjunctiva clear  HENT: ear canals and TM's normal.  Nose and mouth without ulcers, erythema or lesions  NECK: supple, nontender, no lymphadenopathy  RESP: lungs clear to auscultation - no rales, rhonchi or wheezes  CV: regular rates and rhythm, normal S1 S2, no murmur noted  ABDOMEN:  soft, nontender, no HSM or masses and bowel sounds normal  NEURO: Normal strength and tone, sensory exam grossly normal,  normal speech and mentation  SKIN: no suspicious lesions or rashes    ASSESSMENT:  1. Fever and chills  Flu neg   Reassure   Symptomatic cares were discussed in detail. Pt instructed to come back to the clinic for worsening sx    - Influenza A/B antigen  - ondansetron (ZOFRAN-ODT) 4 MG ODT tab; Take 1 tablet (4 mg) by mouth every 8 hours as needed for nausea  Dispense: 18 tablet; Refill: 0

## 2019-02-13 DIAGNOSIS — M81.0 OSTEOPOROSIS WITHOUT CURRENT PATHOLOGICAL FRACTURE, UNSPECIFIED OSTEOPOROSIS TYPE: Primary | ICD-10-CM

## 2019-03-26 DIAGNOSIS — F32.A DEPRESSION, UNSPECIFIED DEPRESSION TYPE: ICD-10-CM

## 2019-03-26 NOTE — TELEPHONE ENCOUNTER
Medication is being filled for 1 time refill only due to:  Patient needs to be seen because due for med check and PHQ-9.

## 2019-03-26 NOTE — TELEPHONE ENCOUNTER
"Requested Prescriptions   Pending Prescriptions Disp Refills     sertraline (ZOLOFT) 50 MG tablet [Pharmacy Med Name: SERTRALINE HCL 50 MG TABLET]  Last Written Prescription Date:  12-4-18  Last Fill Quantity: 90tab,  # refills: 0   Last office visit: 8/1/2018 with prescribing provider:  benji   Future Office Visit:   Next 5 appointments (look out 90 days)    Apr 02, 2019 10:30 AM CDT  [facility] Truman [facility]ma MD  [facility] [address] [place] [id number]  [phone]          90 tablet 0     Sig: TAKE 1 TABLET BY MOUTH EVERY DAY    SSRIs Protocol Failed - 3/26/2019  1:20 AM       Failed - PHQ-9 score less than 5 in past 6 months    Please review last PHQ-9 score.   PHQ-9 SCORE 12/12/2017   PHQ-9 Total Score MyChart 4 (Minimal depression)   PHQ-9 Total Score 4              Passed - Medication is active on med list       Passed - Patient is age 18 or older       Passed - No active pregnancy on record       Passed - No positive pregnancy test in last 12 months       Passed - Recent (6 mo) or future (30 days) visit within the authorizing provider's specialty    Patient had office visit in the last 6 months or has a visit in the next 30 days with authorizing provider or within the authorizing provider's specialty.  See \"Patient Info\" tab in inbasket, or \"Choose Columns\" in Meds & Orders section of the refill encounter.              "

## 2019-04-02 ENCOUNTER — OFFICE VISIT (OUTPATIENT)
Dept: FAMILY MEDICINE | Facility: CLINIC | Age: 84
End: 2019-04-02
Payer: COMMERCIAL

## 2019-04-02 VITALS
BODY MASS INDEX: 29.63 KG/M2 | HEART RATE: 91 BPM | TEMPERATURE: 97.8 F | WEIGHT: 161 LBS | SYSTOLIC BLOOD PRESSURE: 124 MMHG | HEIGHT: 62 IN | DIASTOLIC BLOOD PRESSURE: 68 MMHG | RESPIRATION RATE: 20 BRPM

## 2019-04-02 DIAGNOSIS — G47.00 INSOMNIA, UNSPECIFIED TYPE: ICD-10-CM

## 2019-04-02 DIAGNOSIS — K59.00 CONSTIPATION, UNSPECIFIED CONSTIPATION TYPE: ICD-10-CM

## 2019-04-02 DIAGNOSIS — M81.0 OSTEOPOROSIS WITHOUT CURRENT PATHOLOGICAL FRACTURE, UNSPECIFIED OSTEOPOROSIS TYPE: ICD-10-CM

## 2019-04-02 DIAGNOSIS — R05.3 CHRONIC COUGH: ICD-10-CM

## 2019-04-02 DIAGNOSIS — R29.6 FALLS FREQUENTLY: ICD-10-CM

## 2019-04-02 DIAGNOSIS — K21.9 GASTROESOPHAGEAL REFLUX DISEASE WITHOUT ESOPHAGITIS: ICD-10-CM

## 2019-04-02 DIAGNOSIS — F32.A DEPRESSION, UNSPECIFIED DEPRESSION TYPE: ICD-10-CM

## 2019-04-02 DIAGNOSIS — I10 HYPERTENSION, UNSPECIFIED TYPE: Primary | ICD-10-CM

## 2019-04-02 DIAGNOSIS — I83.93 VARICOSE VEINS OF BOTH LOWER EXTREMITIES, UNSPECIFIED WHETHER COMPLICATED: ICD-10-CM

## 2019-04-02 DIAGNOSIS — R26.89 POOR BALANCE: ICD-10-CM

## 2019-04-02 PROCEDURE — 99214 OFFICE O/P EST MOD 30 MIN: CPT | Performed by: INTERNAL MEDICINE

## 2019-04-02 RX ORDER — CODEINE PHOSPHATE AND GUAIFENESIN 10; 100 MG/5ML; MG/5ML
1-2 SOLUTION ORAL
Qty: 180 ML | Refills: 0 | Status: SHIPPED | OUTPATIENT
Start: 2019-04-02 | End: 2019-08-20

## 2019-04-02 ASSESSMENT — PATIENT HEALTH QUESTIONNAIRE - PHQ9: SUM OF ALL RESPONSES TO PHQ QUESTIONS 1-9: 0

## 2019-04-02 ASSESSMENT — MIFFLIN-ST. JEOR: SCORE: 1068.54

## 2019-04-02 NOTE — PATIENT INSTRUCTIONS
Add some Miralax for your bowels.                   Start with one scoop mixed in liquid daily; if necessary, you can resume taking the senna also.                     You can also cut back the Miralax to every other day or less, if you have loose bowels.                        You can take the codeine cough syrup at bedtime, so that you do not cough all night.

## 2019-04-02 NOTE — PROGRESS NOTES
"  SUBJECTIVE:   Aracelis Blakely is a 89 year old female who presents to clinic today for the following health issues:      Hypertension Follow-up      Outpatient blood pressures are being checked at home.    Low Salt Diet: no added salt      Amount of exercise or physical activity: None    Problems taking medications regularly: No    Medication side effects: none    Diet: low salt and low fat/cholesterol      Medication Followup of other medical, gray. Senokot med, \"not working\"    Taking Medication as prescribed: yes    Side Effects:  None    Medication Helping Symptoms:  yes                Here with an .                Pain all over.          Balance is poor.  Not dizzy.       Has fallen.  Most recent fall one month ago,getting out of a chair; still some R knee soreness.                                             Uses a cane in her apt; a walker outside her apt.         Her walker is old; wobbly,and \"noisy\".  Wants an Rx for a new walker.                      Chronic cough.   Wants an Rx \"that will be covered by insurance\".                 Chronic constipation and sleep disturbance.            home BP variable.          Sometimes up to 160 systolic.         Patient Active Problem List   Diagnosis     Insomnia, unspecified type     Hypertension, unspecified type     Gastroesophageal reflux disease without esophagitis     Osteoporosis without current pathological fracture, unspecified osteoporosis type     Health Care Home     Chronic cough     Constipation, unspecified constipation type     Depression, unspecified depression type     Past Surgical History:   Procedure Laterality Date     CHOLECYSTECTOMY       RELEASE CARPAL TUNNEL Right 6/13/2016       Social History     Tobacco Use     Smoking status: Never Smoker     Smokeless tobacco: Never Used   Substance Use Topics     Alcohol use: No     Family History   Problem Relation Age of Onset     Unknown/Adopted Mother      Unknown/Adopted Father  "         Current Outpatient Medications   Medication Sig Dispense Refill     acetaminophen (TYLENOL) 325 MG tablet Take 325-650 mg by mouth       albuterol (2.5 MG/3ML) 0.083% neb solution Inhale 2.5 mg into the lungs       alendronate (FOSAMAX) 70 MG tablet TAKE 1 TAB BY MOUTH ONCE A WEEK IN THE AM. TAKE ON EMPTY STOMACH WITH WATER. DONT LIE DOWN FOR 1 HR 12 tablet 3     amLODIPine (NORVASC) 2.5 MG tablet Take 1 tablet (2.5 mg) by mouth daily 90 tablet 3     aspirin 81 MG EC tablet Take 1 tablet (81 mg) by mouth daily 90 tablet 3     azelastine (OPTIVAR) 0.05 % SOLN ophthalmic solution Place 1 drop into both eyes 2 times daily       cetirizine (ZYRTEC) 10 MG tablet Take 10 mg by mouth       cholecalciferol (VITAMIN D3) 1000 UNIT tablet Take by mouth daily       cyanocobalamin 1000 MCG TABS        ketotifen (ZADITOR) 0.025 % SOLN ophthalmic solution 1 drop       Multiple Vitamin (DAILY NETTA) TABS Take 1 tablet by mouth daily 90 tablet 3     Omega-3 Fatty Acids (OMEGA-3 FISH OIL PO)        omeprazole 20 MG tablet Take 1 tablet (20 mg) by mouth daily 90 tablet 3     order for DME Equipment being ordered:  Compression stockings,knee high,mild compression 3 Units 3     order for DME Knee high compression stockings.  Light compression.  16-20mm Hg.  Ok 3 pairs.       OYSTER SHELL CALCIUM/D 500-200 MG-UNIT tablet TAKE 1 TABLET BY MOUTH 2 TIMES DAILY WITH MEALS. 180 tablet 3     sennosides (SENOKOT) 8.6 MG tablet Take 3 tablets by mouth daily 270 tablet 3     sertraline (ZOLOFT) 50 MG tablet TAKE 1 TABLET BY MOUTH EVERY DAY 30 tablet 0     zolpidem (AMBIEN) 5 MG tablet TAKE ONE-HALF TABLET BY MOUTH AT BEDTIME AS NEEDED FOR SLEEP 15 tablet 3     Cyanocobalamin (B-12) 100 MCG TABS        No Known Allergies  BP Readings from Last 3 Encounters:   04/02/19 124/68   01/14/19 150/77   08/01/18 134/74    Wt Readings from Last 3 Encounters:   04/02/19 73 kg (161 lb)   08/01/18 72.1 kg (159 lb)   12/12/17 72.1 kg (159 lb)           "          Reviewed and updated as needed this visit by clinical staff  Allergies       Reviewed and updated as needed this visit by Provider         ROS:see above plus  CONSTITUTIONAL:NEGATIVE for fever, chills, change in weight  RESP:NEGATIVE for cough-productive, hemoptysis and wheezing  CV: NEGATIVE for chest pain, palpitations or peripheral edema    OBJECTIVE:                                                    /68 (BP Location: Right arm, Patient Position: Chair, Cuff Size: Adult Large)   Pulse 91   Temp 97.8  F (36.6  C)   Resp 20   Ht 1.575 m (5' 2\")   Wt 73 kg (161 lb)   Breastfeeding? No   BMI 29.45 kg/m    Body mass index is 29.45 kg/m .  GENERAL APPEARANCE: alert, no distress and over weight  RESP: no rales or rhonchi  CV: regular rates and rhythm, normal S1 S2, no S3 or S4 and no murmur, click or rub  NEURO: Normal strength and tone  PSYCH: affect normal/bright and conversant; in her language,with the   MS:minor ecchymosis R knee                              Diagnostic test results:  none      ASSESSMENT/PLAN:                                                        ICD-10-CM    1. Hypertension, unspecified type I10    2. Constipation, unspecified constipation type K59.00    3. Gastroesophageal reflux disease without esophagitis K21.9    4. Osteoporosis without current pathological fracture, unspecified osteoporosis type M81.0    5. Depression, unspecified depression type F32.9    6. Falls frequently R29.6 order for DME     DISCONTINUED: order for DME   7. Poor balance R26.89 order for DME     DISCONTINUED: order for DME   8. Varicose veins of both lower extremities, unspecified whether complicated I83.93 order for DME   9. Chronic cough R05 guaiFENesin-codeine (ROBITUSSIN AC) 100-10 MG/5ML solution   10. Insomnia, unspecified type G47.00        Summary and implications:  We reviewed multiple issues.           We reviewed all of the issues on the diagnoses list.            Rx for " walker and for new support stockings given also.              Patient Instructions   Add some Miralax for your bowels.                   Start with one scoop mixed in liquid daily; if necessary, you can resume taking the senna also.                     You can also cut back the Miralax to every other day or less, if you have loose bowels.                        You can take the codeine cough syrup at bedtime, so that you do not cough all night.       Return in about 5 months (around 9/3/2019) for yearly wellness visit, labs will be needed.      Truman Palma MD  [facility]

## 2019-05-10 DIAGNOSIS — G47.00 INSOMNIA, UNSPECIFIED TYPE: ICD-10-CM

## 2019-05-10 NOTE — TELEPHONE ENCOUNTER
Controlled Substance Refill Request for zolpidem (AMBIEN) 5 MG tablet  Problem List Complete:  Yes    Unable to find patient on  3/28/18                           Chronic zolpidem use; BIW    Last Written Prescription Date:  8/31/2018  Last Fill Quantity: 15 tablet,  # refills: 3   Last office visit: 4/2/2019 with prescribing provider:  Louie   Future Office Visit:   Next 5 appointments (look out 90 days)    Aug 06, 2019 10:00 AM CDT  [facility] [address] [place] [id number]  [phone]           Controlled substance agreement:   Encounter-Level CSA:    There are no encounter-level csa.     Patient-Level CSA:    There are no patient-level csa.         Last Urine Drug Screen: No results found for: CDAUT, No results found for: COMDAT, No results found for: THC13, PCP13, COC13, MAMP13, OPI13, AMP13, BZO13, TCA13, MTD13, BAR13, OXY13, PPX13, BUP13     Processing:  Fax Rx to [facility] [id number] [place] [facility] [address] [place] [address] pharmacy    [email or web address]   checked in past 3 months?  No, route to RN

## 2019-05-14 RX ORDER — ZOLPIDEM TARTRATE 5 MG/1
TABLET ORAL
Qty: 15 TABLET | Refills: 0 | Status: SHIPPED | OUTPATIENT
Start: 2019-05-14 | End: 2021-10-14

## 2019-05-14 NOTE — TELEPHONE ENCOUNTER
RX [place] ([place] reviewed:  reviewed- no concerns    [place]:  [email or web address]  Routing refill request to provider for review/approval because:  Drug not on the [facility] refill protocol

## 2019-05-20 DIAGNOSIS — M81.0 OSTEOPOROSIS WITHOUT CURRENT PATHOLOGICAL FRACTURE, UNSPECIFIED OSTEOPOROSIS TYPE: ICD-10-CM

## 2019-05-20 DIAGNOSIS — K21.9 GASTROESOPHAGEAL REFLUX DISEASE WITHOUT ESOPHAGITIS: Primary | ICD-10-CM

## 2019-05-22 RX ORDER — MULTIVITAMIN WITH FOLIC ACID 400 MCG
TABLET ORAL
Qty: 90 TABLET | Refills: 1 | Status: SHIPPED | OUTPATIENT
Start: 2019-05-22 | End: 2019-11-08

## 2019-05-22 NOTE — TELEPHONE ENCOUNTER
Routing refill request to provider for review/approval because:  No diagnosis of Osteoporosis on record

## 2019-05-22 NOTE — TELEPHONE ENCOUNTER
"Requested Prescriptions   Pending Prescriptions Disp Refills     Multiple Vitamin (DAILY-NETTA) TABS [Pharmacy Med Name: DAILY-NETTA TABLET]  Last Written Prescription Date:  12/12/2017  Last Fill Quantity: 90,  # refills: 3   Last office visit: 4/2/2019 with prescribing provider:  Louie   Future Office Visit:   Next 5 appointments (look out 90 days)    Aug 06, 2019 10:00 AM CDT  PHYSICAL with Truman [facility]ma MD  [facility] [address] [place] [address]  [phone]          90 tablet 0     Sig: TAKE 1 TABLET BY MOUTH EVERY DAY       Vitamin Supplements (Adult) Protocol Passed - 5/21/2019  1:26 PM        Passed - High dose Vitamin D not ordered        Passed - Recent (12 mo) or future (30 days) visit within the authorizing provider's specialty     Patient had office visit in the last 12 months or has a visit in the next 30 days with authorizing provider or within the authorizing provider's specialty.  See \"Patient Info\" tab in inbasket, or \"Choose Columns\" in Meds & Orders section of the refill encounter.              Passed - Medication is active on med list        omeprazole (PRILOSEC) 20 MG DR capsule [Pharmacy Med Name: OMEPRAZOLE DR 20 MG CAPSULE]  Last Written Prescription Date:  12/12/2017  Last Fill Quantity: 90,  # refills: 3   Last office visit: 4/2/2019 with prescribing provider:  Louie   Future Office Visit:   Next 5 appointments (look out 90 days)    Aug 06, 2019 10:00 AM CDT  PHYSICAL with Truman Palma MD  [facility] [address] [place] [address]  [phone]          90 capsule 0     Sig: TAKE 1 CAPSULE BY MOUTH EVERY DAY BEFORE A MEAL       PPI Protocol Failed - 5/21/2019  1:26 PM        Failed - No diagnosis of osteoporosis on record        Passed - Not on Clopidogrel (unless " "Pantoprazole ordered)        Passed - Recent (12 mo) or future (30 days) visit within the authorizing provider's specialty     Patient had office visit in the last 12 months or has a visit in the next 30 days with authorizing provider or within the authorizing provider's specialty.  See \"Patient Info\" tab in inbasket, or \"Choose Columns\" in Meds & Orders section of the refill encounter.              Passed - Medication is active on med list        Passed - Patient is age 18 or older        Passed - No active pregnacy on record        Passed - No positive pregnancy test in past 12 months          "

## 2019-05-28 ENCOUNTER — PATIENT OUTREACH (OUTPATIENT)
Dept: GERIATRIC MEDICINE | Facility: CLINIC | Age: 84
End: 2019-05-28

## 2019-05-28 NOTE — PROGRESS NOTES
[facility] Care Coordination Contact    First Attempt:    Called adult daughter Yoselin to schedule annual HRA home visit. Yoselin stated any day in the morning would be best for a home visit. Daughter prefers that Blanca Page ([phone]) interprets for the home visit and asked writer to schedule a home visit with her. Left a message for Blanca.    Received a call back from Blanca. Blanca is going to call writer the morning of 5/29/19 to schedule a day/time for the home visit.    Flora Luis, MARTA  [facility]  [phone]  Fax: [phone]

## 2019-06-03 DIAGNOSIS — F32.A DEPRESSION, UNSPECIFIED DEPRESSION TYPE: ICD-10-CM

## 2019-06-03 NOTE — TELEPHONE ENCOUNTER
"Requested Prescriptions   Pending Prescriptions Disp Refills     sertraline (ZOLOFT) 50 MG tablet  Last Written Prescription Date:  3/26/2019  Last Fill Quantity: 30,  # refills: 0   Last office visit: 4/2/2019 with prescribing provider:  Louie   Future Office Visit:   Next 5 appointments (look out 90 days)    Aug 06, 2019 10:00 AM CDT  PHYSICAL with Truman Palma MD  [facility]) [address] [place] [address]  [phone]          30 tablet 0     Sig: Take 1 tablet (50 mg) by mouth daily       SSRIs Protocol Passed - 6/3/2019  1:53 PM        Passed - PHQ-9 score less than 5 in past 6 months     Please review last PHQ-9 score.           Passed - Medication is active on med list        Passed - Patient is age 18 or older        Passed - No active pregnancy on record        Passed - No positive pregnancy test in last 12 months        Passed - Recent (6 mo) or future (30 days) visit within the authorizing provider's specialty     Patient had office visit in the last 6 months or has a visit in the next 30 days with authorizing provider or within the authorizing provider's specialty.  See \"Patient Info\" tab in inbasket, or \"Choose Columns\" in Meds & Orders section of the refill encounter.              "

## 2019-06-03 NOTE — TELEPHONE ENCOUNTER
Reason for Call:  Medication or medication refill:    Do you use a [facility]?  [facility]  Name of the medication requested: sertraline (ZOLOFT) 50 MG tablet    Other request: ALMOST OUT    Can we leave a detailed message on this number? YES    Phone number patient can be reached at: Home number on file [phone] (home)    Best Time: ny    Call taken on 6/3/2019 at 1:51 PM by TYRON LAGUERRE

## 2019-06-13 DIAGNOSIS — K59.00 CONSTIPATION, UNSPECIFIED CONSTIPATION TYPE: ICD-10-CM

## 2019-06-13 NOTE — TELEPHONE ENCOUNTER
sennosides (SENOKOT) 8.6 MG tablet     Last Written Prescription Date:  12/12/2017  Last Fill Quantity: 270 tablet,  # refills: 3   Last office visit: 4/2/2019 with prescribing provider:  Louie   Future Office Visit:   Next 5 appointments (look out 90 days)    Aug 06, 2019 10:00 AM CDT  [facility] [address] [place] [address]  [phone]             Routing refill request to provider for review/approval because:  Drug not on the [facility], [facility] or [facility] refill protocol or controlled substance

## 2019-06-14 RX ORDER — SENNOSIDES A AND B 8.6 MG/1
TABLET, FILM COATED ORAL
Qty: 270 TABLET | Refills: 2 | Status: SHIPPED | OUTPATIENT
Start: 2019-06-14 | End: 2020-02-10

## 2019-06-19 ENCOUNTER — PATIENT OUTREACH (OUTPATIENT)
Dept: GERIATRIC MEDICINE | Facility: CLINIC | Age: 84
End: 2019-06-19

## 2019-06-19 ASSESSMENT — ACTIVITIES OF DAILY LIVING (ADL)
DEPENDENT_IADLS:: CLEANING;COOKING;LAUNDRY;SHOPPING;MEAL PREPARATION;MEDICATION MANAGEMENT;MONEY MANAGEMENT;TRANSPORTATION;INCONTINENCE

## 2019-06-19 NOTE — PROGRESS NOTES
[facility] Care Coordination Contact    [facility] Home Visit Assessment     Home visit for Health Risk Assessment with Aracelis Blakely completed on June 19, 2019    Type of residence:: Apartment - handicap accessible  Current living arrangement:: I live alone     Assessment completed with:: Patient, Children, Other()    Current Care Plan  Member currently receiving the following home care services:     Member currently receiving the following community resources: PCA, Other (see comment)(medical phone alert)    Medication Review  Medication reconciliation completed in Epic: Yes  Medication set-up & administration: Family/informal caregiver sets up weekly.  Family caregiver administers medications.  Medication Risk Assessment Medication (1 or more, place referral to MTM): N/A: No risk factors identified  MTM Referral Placed: No: No risk factors idenified    Mental/Behavioral Health   Depression Screening: See PHQ assessment flowsheet.   Mental health DX:: No      Mental Health Diagnosis: No  Mental Health Services: None: No further intervention needed at this time.    Falls Assessment:   Fallen 2 or more times in the past year?: No   Any fall with injury in the past year?: No    ADL/IADL Dependencies:   Dependent ADLs:: Ambulation-cane, Ambulation-walker, Bathing, Dressing, Eating, Grooming, Incontinence, Transfers, Toileting  Dependent IADLs:: Cleaning, Cooking, Laundry, Shopping, Meal Preparation, Medication Management, Money Management, Transportation, Incontinence    [facility] sponsored benefits: Shared information re: Silver Sneakers/gym memberships, ASA, Calcium +D.    PCA Assessment completed at visit: Yes     Elderly Waiver Eligibility: Yes, but member declines EW services; will not open to EW.    Care Plan & Recommendations: Aracelis is unable to afford her cough medication. She would like a different cough medication prescribed that is covered by her insurance.    See  [facility] for detailed assessment information.    Follow-Up Plan: Member informed of future contact, plan to f/u with member with a 6 month telephone assessment.  Contact information shared with member and family, encouraged member to call with any questions or concerns at any time.    [place] care continuum providers: Please refer to Health Care Home on the [facility] Problem List to view this patient's [facility] Care Plan Summary.    MARTA Lino  [facility]  [phone]  Fax: [phone]

## 2019-06-26 ENCOUNTER — PATIENT OUTREACH (OUTPATIENT)
Dept: GERIATRIC MEDICINE | Facility: CLINIC | Age: 84
End: 2019-06-26

## 2019-06-26 NOTE — PROGRESS NOTES
"Emailed RENATO requesting a smaller size incontinent product.    \"Good Afternoon,    My client s daughter reported the size of the incontinent products you are shipping to her are too large. Could you please start send her a size smaller.  Thank you!    \"From: Andrea Hall via [employer] Secure Mail [[email or web address]]   Sent: 2019 2:28 PM  To: Flora Luis  Subject: RE: SECURE:    Order has been changed from Large to Medium as per request.  --Andrea    From: Flora Luis  Sent:  19:11:04 +0000  To: [employer] ([email or web address])  Cc:  Subject: SECURE:     Good Afternoon,     My client s daughter reported the size of the incontinent products you are shipping to her are too large. Could you please start send her a size smaller.  Thank you!     [facility] CARE PLAN SUMMARY     Member Name:  Aracelis Blakely  Address:  [address] [place] [address] Phone: [phone] (home)    :  3/21/1922 Date of Assessment:  19   Health Plan:  [facility]  Health Plan Number: [id number] Medical Assistance Number: [id number]  Financial Worker: Team [address]   Case #:  [id number]          MARTA Lino  Care Coordinator  [address] [address]  [email or web address]   [email or web address]   Office: [phone]  Cell: [phone]  Fax [phone]\"    MARTA Lino  [facility]  [phone]  Fax: [phone]    "

## 2019-06-26 NOTE — PROGRESS NOTES
PCA hours will be increased to 11 hours per day. Left message for daughter Yoselin informing her of the increase. Spoke w/ PCA provider Moy harvey informing them of the increase. Informed provider they would receive an auth from [facility] with the start date of the increased hours.    MARTA Lino  [facility]  [phone]  Fax: [phone]

## 2019-06-27 ENCOUNTER — PATIENT OUTREACH (OUTPATIENT)
Dept: GERIATRIC MEDICINE | Facility: CLINIC | Age: 84
End: 2019-06-27

## 2019-06-27 NOTE — LETTER
[employer]  [address] [place] [address], [address]  [place] [address]  Phone:  [phone]  Fax:  [phone]        June 27, 2019    KYLEE DIAZ  [address]   [facility] [address]    Dear Kelly Hsu is a copy of your completed PCA Assessment and Service Plan.  This is for your records and no action is required by you.  If you have additional questions regarding your assessment please contact me at [phone]. If you feel that your needs are not being met, please contact the Clinical Supervisor at [phone].    Sincerely,    MARTA Lino    E-mail: [email or web address]  Phone:[phone]      [facility]              Enclosure:  Completed PCA assessment

## 2019-06-27 NOTE — PROGRESS NOTES
[facility] Care Coordination Contact    [employer]:  Faxed completed PCA assessment to [facility] Agency and mailed copies to member.  Faxed MD Communication to PCP.  Emailed referral form for auth to [employer].    Fozia Persaud  Care Management Specialist  [facility]  [phone]

## 2019-07-01 ENCOUNTER — PATIENT OUTREACH (OUTPATIENT)
Dept: GERIATRIC MEDICINE | Facility: CLINIC | Age: 84
End: 2019-07-01

## 2019-07-01 NOTE — PROGRESS NOTES
Received call from daughter stating she received the wrong oxygen supplies for [facility]. Assisted daughter in calling the oxygen company ([phone]) to order correct oxygen supplies. [employer] will be mailing out connector supplies needed.    MARTA Lino  [facility]  [phone]  Fax: [phone]

## 2019-07-10 ENCOUNTER — PATIENT OUTREACH (OUTPATIENT)
Dept: GERIATRIC MEDICINE | Facility: CLINIC | Age: 84
End: 2019-07-10

## 2019-07-10 NOTE — LETTER
July 10, 2019    Important [employer] Information    ARACELIS KENNEDY JOE  [address]   [place] [address]  Your Care Plan  Dear Aracelis,  When we spoke recently, I promised to send you a Care Plan. The plan enclosed is a summary of our discussion. It includes the steps we agreed would help you meet your health goals. In addition, I can help you with:  [id number]  This program is available to members who need a ride to medical and dental visits. To schedule a ride, call [phone] or [phone] (toll free). TTY/TTD: 711. You can call 8 a.m. to 8 p.m. Seven days a week. Access to a representative may be limited at times.    [facility]   The [facility] [employer] program empowers you to improve your health through education and exercise. To learn more, visit [employer], or call [employer] at [phone] (toll free) (TTY:711) from 7 a.m. - 7 p.m. Central Time, Monday-Friday.  Health Care Directive   This form helps you outline your health care wishes. You can request a form from me and I will answer any questions you have before you discuss it with your doctor.   Annual Physical  Take a key step on your path to good health and set up an annual physical at your clinic.  Questions?  Call me at [phone] Monday-Friday between 8am and 5pm.  TTY/TTD: 711. As we discussed, I plan to be in touch with you again in 6 months to follow up via phone.  Sincerely,    MARTA Lino    E-mail: [email or web address]  Phone:[phone]      [employer]            cc: member records                    Civil Rights Notice  Discrimination is against the law. [employer] does not discriminate on the basis of any of the following:    Race    Color    National Origin    Creed    Lutheran    Age    Public Assistance Status    Receipt of Health Care Services    Disability (including physical or mental impairment)    Sex (including sex stereotypes and gender  identity)    Marital Status    Political Beliefs    Medical Condition    Genetic Information    Sexual Orientation    Claims Experience    Medical History    Health Status    Auxiliary Aids and Services:  [employer] provides auxiliary aids and services, like qualified interpreters or information in accessible formats, free of charge and in a timely manner, to ensure an equal opportunity to participate in our health care programs. Contact [employer] Customer Service at [employer]/contact medicaid or call [phone] (toll free); TTY:712 or at [email or web address].    Language Assistance Services:  [employer] provides translated documents and spoken language interpreting, free of charge and in a timely manner, when language assistance services are necessary to ensure limited English speakers have meaningful access to our information and services. Contact [employer] at [phone] (toll free); TTY: 763 or [email or web address].     Civil Rights Complaints  You have the right to file a discrimination complaint if you believe you were treated in a discriminatory way by [employer]. You may contact any of the following four agencies directly to file a discrimination complaint.    U.S. Department of Health and Human Services  Office for Civil Rights (OCR)  You have the right to file a complaint with the OCR, a federal agency, if you believe you have been discriminated against because of any of the following:    Race    Disability    Color    Sex    National Origin    Age      Contact the OCR directly to file a complaint:         Director         U.S. Department of Health and Human Services  Office for Civil Rights         [address] [place] [address], [place] [address]         [phone] (Voice)         [phone] (TDD)         Complaint Portal - https://ocrportal.hhs.gov/ocr/portal/lobby.jsf     [place] Department of Human Rights ([facility])  In [place], you have the right to  file a complaint with the MDHR if you believe you have been discriminated against because of any of the following:      Race    Color    National Origin    Spiritism    Creed    Sex    Sexual Orientation    Marital Status    Public Assistance Status    Disability    Contact the MDHR directly to file a complaint:         [place] Department of Human Rights         [facility], [address] [place] [address]         [phone] (voice)          [phone] (toll free)         711 or [phone] ([place] Relay)         [phone] (Fax)         [email or web address] (Email)     [place] Department of Human Services (DHS)  You have the right to file a complaint with [facility] if you believe you have been discriminated against in our health care programs because of any of the following:    Race    Color    National Origin    Creed    Spiritism    Age    Public Assistance Status    Receipt of Health Care Services    Disability (including physical or mental impairment)    Sex (including sex stereotypes and gender identity)    Marital Status    Political Beliefs    Medical Condition    Genetic Information    Sexual Orientation    Claims Experience    Medical History    Health Status    Complaints must be in writing and filed within 180 days of the date you discovered the alleged discrimination. The complaint must contain your name and address and describe the discrimination you are complaining about. After we get your complaint, we will review it and notify you in writing about whether we have authority to investigate. If we do, we will investigate the complaint.      [facility] will notify you in writing of the investigation s outcome. You have a right to appeal the outcome if you disagree with the decision. To appeal, you must send a written request to have [facility] review the investigation outcome period. Be brief and state why you disagree with the decision. Include additional information you think is important.       If you file a complaint in this way, the people who work for the agency named in the complaint cannot retaliate against you. This means they cannot punish you in any way for filing a complaint. Filing a complaint in this way does not stop you from seeking out other legal or administration actions.     Contact DHS directly to file a discrimination complaint:        Civil Rights Coordinator        [facility]        Equal Opportunity and Access Division        [address] [address]        [place] [address] [phone] (voice) or use your preferred relay service     [facility] Complaint Notice   You have the right to file a complaint with [employer] if you believe you have been discriminated against because of any of the following:       Medical condition    Health status    Receipt of health care services    Claims experience    Medical history    Genetic information    Disability (including mental or physical impairment)    Marital status    Age    Sex (including sex stereotypes and gender identity)    Sexual orientation    National origin    Race    Color    Cheondoism    Creed    Public assistance status    Political beliefs    You can file a complaint and ask for help in filing a complaint in person or by mail, phone, fax, or email at:     [facility] Civil Rights Coordinator  [facility]  [address], [place] [address] [phone] (voice and fax) or TTY:086  Email: [email or web address]    American Indians can continue to use [facility] and  [facility] Services ([facility]) clinics. We will not require prior approval or impose any conditions for you to get services at these clinics. For elders age 65 years and older this includes Elderly Waiver (EW) services accessed through the [place]. If a doctor or other provider in a [facility] or [facility] clinic refers you to a provider in our network, we will not require you to see your primary care provider prior to the  referral.    For accessible formats of this publication or assistance with equal or access to our services, visit [email or web address], or call [phone] (toll free) or use your preferred relay service.

## 2019-07-10 NOTE — PROGRESS NOTES
[facility] Care Coordination Contact    Received after visit chart from care coordinator.  Completed following tasks: Mailed copy of care plan to client and Updated services in access  Chart was returned to [employer].   Inoa Dhillon  Case Management Specialist  [facility]  [phone]

## 2019-08-20 ENCOUNTER — OFFICE VISIT (OUTPATIENT)
Dept: FAMILY MEDICINE | Facility: CLINIC | Age: 84
End: 2019-08-20
Payer: COMMERCIAL

## 2019-08-20 ENCOUNTER — ANCILLARY PROCEDURE (OUTPATIENT)
Dept: GENERAL RADIOLOGY | Facility: CLINIC | Age: 84
End: 2019-08-20
Attending: INTERNAL MEDICINE
Payer: COMMERCIAL

## 2019-08-20 VITALS
OXYGEN SATURATION: 92 % | SYSTOLIC BLOOD PRESSURE: 120 MMHG | RESPIRATION RATE: 20 BRPM | WEIGHT: 163 LBS | BODY MASS INDEX: 30 KG/M2 | HEART RATE: 86 BPM | DIASTOLIC BLOOD PRESSURE: 70 MMHG | HEIGHT: 62 IN

## 2019-08-20 DIAGNOSIS — M81.0 OSTEOPOROSIS WITHOUT CURRENT PATHOLOGICAL FRACTURE, UNSPECIFIED OSTEOPOROSIS TYPE: ICD-10-CM

## 2019-08-20 DIAGNOSIS — K59.00 CONSTIPATION, UNSPECIFIED CONSTIPATION TYPE: ICD-10-CM

## 2019-08-20 DIAGNOSIS — R05.3 CHRONIC COUGH: ICD-10-CM

## 2019-08-20 DIAGNOSIS — K21.9 GASTROESOPHAGEAL REFLUX DISEASE WITHOUT ESOPHAGITIS: ICD-10-CM

## 2019-08-20 DIAGNOSIS — G47.00 INSOMNIA, UNSPECIFIED TYPE: ICD-10-CM

## 2019-08-20 DIAGNOSIS — I10 HYPERTENSION, UNSPECIFIED TYPE: ICD-10-CM

## 2019-08-20 DIAGNOSIS — Z00.00 ROUTINE GENERAL MEDICAL EXAMINATION AT A HEALTH CARE FACILITY: Primary | ICD-10-CM

## 2019-08-20 DIAGNOSIS — F32.A DEPRESSION, UNSPECIFIED DEPRESSION TYPE: ICD-10-CM

## 2019-08-20 DIAGNOSIS — R26.89 POOR BALANCE: ICD-10-CM

## 2019-08-20 LAB
BASOPHILS # BLD AUTO: 0 10E9/L (ref 0–0.2)
BASOPHILS NFR BLD AUTO: 0.5 %
DIFFERENTIAL METHOD BLD: NORMAL
EOSINOPHIL # BLD AUTO: 0.2 10E9/L (ref 0–0.7)
EOSINOPHIL NFR BLD AUTO: 4.1 %
ERYTHROCYTE [DISTWIDTH] IN BLOOD BY AUTOMATED COUNT: 11.9 % (ref 10–15)
HCT VFR BLD AUTO: 39.7 % (ref 35–47)
HGB BLD-MCNC: 13.4 G/DL (ref 11.7–15.7)
LYMPHOCYTES # BLD AUTO: 1.6 10E9/L (ref 0.8–5.3)
LYMPHOCYTES NFR BLD AUTO: 27.7 %
MCH RBC QN AUTO: 28.9 PG (ref 26.5–33)
MCHC RBC AUTO-ENTMCNC: 33.8 G/DL (ref 31.5–36.5)
MCV RBC AUTO: 86 FL (ref 78–100)
MONOCYTES # BLD AUTO: 0.6 10E9/L (ref 0–1.3)
MONOCYTES NFR BLD AUTO: 10.8 %
NEUTROPHILS # BLD AUTO: 3.3 10E9/L (ref 1.6–8.3)
NEUTROPHILS NFR BLD AUTO: 56.9 %
PLATELET # BLD AUTO: 197 10E9/L (ref 150–450)
RBC # BLD AUTO: 4.63 10E12/L (ref 3.8–5.2)
WBC # BLD AUTO: 5.9 10E9/L (ref 4–11)

## 2019-08-20 PROCEDURE — 99397 PER PM REEVAL EST PAT 65+ YR: CPT | Performed by: INTERNAL MEDICINE

## 2019-08-20 PROCEDURE — 80053 COMPREHEN METABOLIC PANEL: CPT | Performed by: INTERNAL MEDICINE

## 2019-08-20 PROCEDURE — 99213 OFFICE O/P EST LOW 20 MIN: CPT | Mod: 25 | Performed by: INTERNAL MEDICINE

## 2019-08-20 PROCEDURE — 71046 X-RAY EXAM CHEST 2 VIEWS: CPT | Mod: FY

## 2019-08-20 PROCEDURE — 85025 COMPLETE CBC W/AUTO DIFF WBC: CPT | Performed by: INTERNAL MEDICINE

## 2019-08-20 PROCEDURE — 80061 LIPID PANEL: CPT | Performed by: INTERNAL MEDICINE

## 2019-08-20 PROCEDURE — 36415 COLL VENOUS BLD VENIPUNCTURE: CPT | Performed by: INTERNAL MEDICINE

## 2019-08-20 RX ORDER — AMLODIPINE BESYLATE 2.5 MG/1
2.5 TABLET ORAL DAILY
Qty: 90 TABLET | Refills: 3 | Status: SHIPPED | OUTPATIENT
Start: 2019-08-20 | End: 2020-07-21

## 2019-08-20 ASSESSMENT — ACTIVITIES OF DAILY LIVING (ADL)
CURRENT_FUNCTION: MEDICATION ADMINISTRATION REQUIRES ASSISTANCE
CURRENT_FUNCTION: MONEY MANAGEMENT REQUIRES ASSISTANCE
CURRENT_FUNCTION: PREPARING MEALS REQUIRES ASSISTANCE
CURRENT_FUNCTION: BATHING REQUIRES ASSISTANCE
CURRENT_FUNCTION: LAUNDRY REQUIRES ASSISTANCE
CURRENT_FUNCTION: TRANSPORTATION REQUIRES ASSISTANCE
CURRENT_FUNCTION: HOUSEWORK REQUIRES ASSISTANCE
CURRENT_FUNCTION: SHOPPING REQUIRES ASSISTANCE

## 2019-08-20 ASSESSMENT — PATIENT HEALTH QUESTIONNAIRE - PHQ9: SUM OF ALL RESPONSES TO PHQ QUESTIONS 1-9: 0

## 2019-08-20 ASSESSMENT — MIFFLIN-ST. JEOR: SCORE: 1077.61

## 2019-08-20 NOTE — LETTER
August 21, 2019      Aracelis Blakely  [address]   [place] [address]        Dear Ms.Rabeti Blakely,    We are writing to inform you of your test results.                    Your lab results are good,including the kidney,liver,bone marrow,glucose,potassium,and the cholesterol numbers.            Keep taking the same medications.                                                                                                                                                    Your chest xray result was good.     Results for orders placed or performed in visit on 08/20/19   Comprehensive metabolic panel (BMP + Alb, Alk Phos, ALT, AST, Total. Bili, TP)   Result Value Ref Range    Sodium 139 133 - 144 mmol/L    Potassium 4.3 3.4 - 5.3 mmol/L    Chloride 102 94 - 109 mmol/L    Carbon Dioxide 30 20 - 32 mmol/L    Anion Gap 7 3 - 14 mmol/L    Glucose 94 70 - 99 mg/dL    Urea Nitrogen 17 7 - 30 mg/dL    Creatinine 0.72 0.52 - 1.04 mg/dL    GFR Estimate 70 >60 mL/min/[1.73_m2]    GFR Estimate If Black 81 >60 mL/min/[1.73_m2]    Calcium 9.3 8.5 - 10.1 mg/dL    Bilirubin Total 0.5 0.2 - 1.3 mg/dL    Albumin 3.7 3.4 - 5.0 g/dL    Protein Total 7.1 6.8 - 8.8 g/dL    Alkaline Phosphatase 63 40 - 150 U/L    ALT 18 0 - 50 U/L    AST 14 0 - 45 U/L   CBC with platelets and differential   Result Value Ref Range    WBC 5.9 4.0 - 11.0 10e9/L    RBC Count 4.63 3.8 - 5.2 10e12/L    Hemoglobin 13.4 11.7 - 15.7 g/dL    Hematocrit 39.7 35.0 - 47.0 %    MCV 86 78 - 100 fl    MCH 28.9 26.5 - 33.0 pg    MCHC 33.8 31.5 - 36.5 g/dL    RDW 11.9 10.0 - 15.0 %    Platelet Count 197 150 - 450 10e9/L    % Neutrophils 56.9 %    % Lymphocytes 27.7 %    % Monocytes 10.8 %    % Eosinophils 4.1 %    % Basophils 0.5 %    Absolute Neutrophil 3.3 1.6 - 8.3 10e9/L    Absolute Lymphocytes 1.6 0.8 - 5.3 10e9/L    Absolute Monocytes 0.6 0.0 - 1.3 10e9/L    Absolute Eosinophils 0.2 0.0 - 0.7 10e9/L    Absolute Basophils 0.0 0.0 - 0.2 10e9/L     Diff Method Automated Method    Lipid Profile (Chol, Trig, HDL, LDL calc)   Result Value Ref Range    Cholesterol 213 (H) <200 mg/dL    Triglycerides 136 <150 mg/dL    HDL Cholesterol 47 (L) >49 mg/dL    LDL Cholesterol Calculated 139 (H) <100 mg/dL    Non HDL Cholesterol 166 (H) <130 mg/dL     Recent Results (from the past 48 hour(s))   XR Chest 2 Views    Narrative    CHEST TWO VIEWS 8/20/2019 4:20 PM     HISTORY: Chronic cough.    COMPARISON: December 12, 2017     FINDINGS: There are no acute infiltrates. The cardiac silhouette is  not enlarged. Pulmonary vasculature is unremarkable. Stable nodular  densities at the left base.      Impression    IMPRESSION: No acute disease.    RIN YORK MD         If you have any questions or concerns, please call the clinic at the number listed above.       Sincerely,        Truman Palma MD

## 2019-08-20 NOTE — PATIENT INSTRUCTIONS
I will let you know your lab and xray results.   You are going to stop taking the alendronate.                        This could be aggravating your cough.

## 2019-08-20 NOTE — PROGRESS NOTES
"SUBJECTIVE:   Aracelis Blakely is a 89 year old female who presents for Preventive Visit.      Are you in the first 12 months of your Medicare coverage?  No    Healthy Habits:     In general, how would you rate your overall health?  Fair    Frequency of exercise:  None    Do you usually eat at least 4 servings of fruit and vegetables a day, include whole grains    & fiber and avoid regularly eating high fat or \"junk\" foods?  Yes    Taking medications regularly:  Yes    Barriers to taking medications:  None    Medication side effects:  None    Ability to successfully perform activities of daily living:  Transportation requires assistance, shopping requires assistance, preparing meals requires assistance, housework requires assistance, bathing requires assistance, laundry requires assistance, money management requires assistance and medication administration requires assistance    Home Safety:  No safety concerns identified    Hearing Impairment:  No hearing concerns    In the past 6 months, have you been bothered by leaking of urine? Yes    In general, how would you rate your overall mental or emotional health?  Fair      PHQ-2 Total Score: 0    Additional concerns today:  No    Do you feel safe in your environment? Yes    Do you have a Health Care Directive? Yes: Patient states has Advance Directive and will bring in a copy to clinic.      Fall risk     No falls within last year, but at times unsteady gait  Cognitive Screening   1) Repeat 3 items (Leader, Season, Table)    2) Clock draw: NORMAL  3) 3 item recall: Recalls 3 objects  Results: 3 items recalled: COGNITIVE IMPAIRMENT LESS LIKELY    Mini-CogTM Copyright BRIAN Kc. Licensed by the author for use in [facility]; reprinted with permission (shaheen@.[email or web address]). All rights reserved.      Do you have sleep apnea, excessive snoring or daytime drowsiness?: no    Reviewed and updated as needed this visit by clinical staff  Allergies         Reviewed " and updated as needed this visit by Provider        Social History     Tobacco Use     Smoking status: Never Smoker     Smokeless tobacco: Never Used   Substance Use Topics     Alcohol use: No     If you drink alcohol do you typically have >3 drinks per day or >7 drinks per week? No    No flowsheet data found.         This patient is here with an .        Her chief complaint is ongoing, chronic cough in the evening. Sometimes this awakens her.               Cough is nonproductive. No wheezing or hemoptysis or dyspnea on exertion.                                  She does have ongoing acid reflux, controlled with omeprazole. She takes this in the morning before breakfast. She does not complain of heartburn symptoms in the evening or at night.               No postnasal drainage.       No history of asthma.                 No smoking history.                                      We reviewed her other medications. She is still taking alendronate.               Current providers sharing in care for this patient include:   Patient Care Team:  Truman Palma MD as PCP - General (Internal Medicine)  Truman Palma MD as Assigned PCP  Flora Luis LSW as Lead Care Coordinator (Primary Care - CC)    The following health maintenance items are reviewed in Epic and correct as of today:  Health Maintenance   Topic Date Due     ADVANCE CARE PLANNING  03/21/1922     DEPRESSION ACTION PLAN  03/21/1922     ZOSTER IMMUNIZATION (2 of 3) 10/23/2013     MEDICARE ANNUAL WELLNESS VISIT  08/01/2019     INFLUENZA VACCINE (1) 09/01/2019     PHQ-9  10/02/2019     FALL RISK ASSESSMENT  06/19/2020     DTAP/TDAP/TD IMMUNIZATION (2 - Td) 01/08/2024     PNEUMOCOCCAL IMMUNIZATION 65+ LOW/MEDIUM RISK  Completed     IPV IMMUNIZATION  Aged Out     MENINGITIS IMMUNIZATION  Aged Out     Patient Active Problem List    Diagnosis Date Noted     Falls frequently 04/02/2019     Priority: Medium     Poor balance 04/02/2019     Priority:  Medium     Varicose veins of both lower extremities, unspecified whether complicated 2019     Priority: Medium     Chronic cough 2018     Priority: Medium     CXR neg in        Constipation, unspecified constipation type 2018     Priority: Medium     Depression, unspecified depression type 2018     Priority: Medium     controlled with sertraline       Insomnia, unspecified type 2017     Priority: Medium     RX monitoring program ([place]) reviewed:  reviewed 19- no concerns     [place]:  [email or web address]  Chronic zolpidem use; BIW       Hypertension, unspecified type 2017     Priority: Medium     Gastroesophageal reflux disease without esophagitis 2017     Priority: Medium     Long term use of PPI       Osteoporosis without current pathological fracture, unspecified osteoporosis type 2017     Priority: Medium     Rx alendronate; since ? ; since  or ?                CXR  shows Mild  kyphosis lower thoracic spine is noted. No vertebral body compression  fracture is evident.       Health Care Home 2018     Priority: Low     [facility] Care Coordinator  MARTA Lino  [phone]    [facility] CARE PLAN SUMMARY    Member Name:  Aracelis Blakely  Address:  [address] [place] [id number] Phone: [phone] (home)    :  3/21/1922 Date of Assessment:  19   Health Plan:  [facility]  Health Plan Number: [id number] Medical Assistance Number: [id number]  Financial Worker: Team [id number]   Case #:  [id number]    [facility] Care Coordinator:  MARTA Lino CC Phone:  [phone]  CC Fax:  [phone]   [facility] Enrollment Date: 2018 Case Mix: I  Rate Cell:  A  Waiver Type:  None   Primary Emergency Contact:   Yoselin Gonzalez  Home Phone: [phone]  Mobile Phone: [phone]  Relation: Daughter  : Helpful    Secondary Emergency Contact:   Corbin Islas  "Phone: [phone]  Relation: Son In-Law (Yoselin's Spouse) Language:  Farsi   :  Yes  : Blanca Page   [phone]     Health Care Agent/POA:   Advanced Directives/Living Will:     Primary Care Clinic/Phone/Fax:  [facility]/(p) [phone], (f) [phone] Primary Dx:  Osteoporosis without current pathological fracture, unspecified osteoporosis type (M81.0)  Secondary Dx: Hypertension, unspecified type (I10)   Primary Physician:  Truman Palma   Height:  5' 2\"  Weight:  161 lbs   Specialty Physician:    Audiologist:  Has hearing aides that she chooses not to wear, because they are uncomfortable.   Eye Care Provider: [employer]     [facility]  Dr. Vadim Seo MD, MM, FACS OPHTHALMOLOGIST  Phone: [phone]  Email: [email or web address]  Address: [address] [place] [address] Dental Care Provider:  [facility]  [facility]: [facility] [phone]   Other:        [facility] CURRENT SERVICES SUMMARY  Equipment owned/DME history: Cane, 4WW Walker, Regular Cane,  Bed Grab Bar, Medical Phone Alert, Shower Chair, Hand Held Shower  Supplemental oxygen as needed during the day and night, nebulizer treatment as needed     SERVICE TYPE/PROVIDER NAME/PHONE AUTH DATE FREQUENCY Units OR $ Amt DESCRIPTION   Medical Transportation: [id number] [phone]   7/1/19-6/30/20 As Needed Covered by [facility]    PCA: [facility] [phone]   7/1/19-6/30/208/1/18-7/31/19 11 Hours Per Day  10.5 Hours Per Day See POC    PCA Supervision: [facility]                                [phone]      7/1/19-6/30/208/1/18-7/31/19 2 Hours Every Other Month See POC    Supplies: [facility] Medical Equipment [phone]  Pull Ups, Size Medium 7/1/19-6/30/20 4-5 Per Day Delivered Monthly See POC                   Past Surgical History:   Procedure Laterality Date     CHOLECYSTECTOMY       RELEASE CARPAL TUNNEL Right 6/13/2016       BP " Readings from Last 3 Encounters:   08/20/19 120/70   04/02/19 124/68   01/14/19 150/77    Wt Readings from Last 3 Encounters:   08/20/19 73.9 kg (163 lb)   04/02/19 73 kg (161 lb)   08/01/18 72.1 kg (159 lb)                  Current Outpatient Medications   Medication Sig Dispense Refill     acetaminophen (TYLENOL) 325 MG tablet Take 325-650 mg by mouth       albuterol (2.5 MG/3ML) 0.083% neb solution Inhale 2.5 mg into the lungs       alendronate (FOSAMAX) 70 MG tablet TAKE 1 TAB BY MOUTH ONCE A WEEK IN THE AM. TAKE ON EMPTY STOMACH WITH WATER. DONT LIE DOWN FOR 1 HR 12 tablet 3     amLODIPine (NORVASC) 2.5 MG tablet Take 1 tablet (2.5 mg) by mouth daily 90 tablet 3     Ascorbic Acid (VITAMIN C/BIOFLAVONOIDS/ROSEHP PO) Take by mouth daily       aspirin 81 MG EC tablet Take 1 tablet (81 mg) by mouth daily 90 tablet 3     azelastine (OPTIVAR) 0.05 % SOLN ophthalmic solution Place 1 drop into both eyes 2 times daily       cetirizine (ZYRTEC) 10 MG tablet Take 10 mg by mouth       cholecalciferol (VITAMIN D3) 1000 UNIT tablet Take by mouth daily       cyanocobalamin 1000 MCG TABS        Multiple Vitamin (DAILY-NETTA) TABS TAKE 1 TABLET BY MOUTH EVERY DAY 90 tablet 1     Omega-3 Fatty Acids (OMEGA-3 FISH OIL PO)        omeprazole (PRILOSEC) 20 MG DR capsule TAKE 1 CAPSULE BY MOUTH EVERY DAY BEFORE A MEAL 90 capsule 3     order for DME WALKER; with wheels, and brakes, and a seat. 1 Device 0     order for DME Equipment being ordered:  Compression stockings,knee high,mild compression 3 Units 3     order for DME Knee high compression stockings.  Light compression.  16-20mm Hg.  Ok 3 pairs.       OVER-THE-COUNTER 550 mg 3 times daily as needed (Takes 3x daily when senokot isn't available or if senokot doesn't work for her.)       OYSTER SHELL CALCIUM/D 500-200 MG-UNIT tablet TAKE 1 TABLET BY MOUTH 2 TIMES DAILY WITH MEALS. 180 tablet 3     senna (SENOKOT) 8.6 MG tablet TAKE THREE TABLETS BY MOUTH DAILY 270 tablet 2      "sertraline (ZOLOFT) 50 MG tablet Take 1 tablet (50 mg) by mouth daily 90 tablet 0     zolpidem (AMBIEN) 5 MG tablet TAKE 1/2 TABLET BY MOUTH AT BEDTIME AS NEEDED FOR SLEEP 15 tablet 0     No Known Allergies      Review of Systemssee above plus  CONSTITUTIONAL:NEGATIVE for fever, chills, change in weight  RESP:NEGATIVE for cough-productive, hemoptysis and wheezing  CV: NEGATIVE for chest pain, palpitations or peripheral edema  GI: NEGATIVE for abdominal pain generalized, hematochezia and melena  : negative for dysuria and hematuria  MUSCULOSKELETAL: NEGATIVE for significant arthralgias or myalgia  NEURO: NEGATIVE for memory problems  PSYCHIATRIC: NEGATIVE for changes in mood or affect    OBJECTIVE:   /70 (BP Location: Left arm, Patient Position: Chair, Cuff Size: Adult Large)   Pulse 86   Resp 20   Ht 1.575 m (5' 2\")   Wt 73.9 kg (163 lb)   SpO2 92%   Breastfeeding? No   BMI 29.81 kg/m   Estimated body mass index is 29.45 kg/m  as calculated from the following:    Height as of 4/2/19: 1.575 m (5' 2\").    Weight as of 4/2/19: 73 kg (161 lb).  Physical Exam   She declines to undress  GENERAL APPEARANCE: healthy, alert, no distress and she appears younger than stated age  NECK: no adenopathy, no asymmetry, masses, or scars and thyroid normal to palpation  RESP: lungs clear to auscultation - no rales, rhonchi or wheezes  CV: regular rates and rhythm, normal S1 S2, no S3 or S4 and no murmur, click or rub  NEURO: Normal strength and tone, speech normal in her language per the , and mentation intact per the   PSYCH: affect normal/bright    Diagnostic Test Results:  pending    ASSESSMENT / PLAN:   Aracelis was seen today for physical.    Diagnoses and all orders for this visit:    Routine general medical examination at a health care facility  -     Lipid Profile (Chol, Trig, HDL, LDL calc)    Hypertension, unspecified type  -     Comprehensive metabolic panel (BMP + Alb, Alk Phos, ALT, " "AST, Total. Bili, TP)  -     amLODIPine (NORVASC) 2.5 MG tablet; Take 1 tablet (2.5 mg) by mouth daily  -     Lipid Profile (Chol, Trig, HDL, LDL calc)    Depression, unspecified depression type    Gastroesophageal reflux disease without esophagitis  -     CBC with platelets and differential  -     omeprazole (PRILOSEC) 20 MG DR capsule; TAKE 1 CAPSULE BY MOUTH EVERY DAY BEFORE A MEAL    Insomnia, unspecified type    Constipation, unspecified constipation type    Osteoporosis without current pathological fracture, unspecified osteoporosis type  -     Comprehensive metabolic panel (BMP + Alb, Alk Phos, ALT, AST, Total. Bili, TP)    Poor balance    Chronic cough  -     XR Chest 2 Views; Future                    Summary and implications:  We reviewed multiple issues.           We reviewed all of the issues on the diagnoses list.                   So far we have not found any major abnormalities in terms of her chronic cough.                  I suggested a follow-up chest x-ray. I also suggested that she stop taking the alendronate.              Check labs and adjust medications as indicated.        Labs are 5 hours postprandial. She also requests a lipid profile.           Patient Instructions   I will let you know your lab and xray results.   You are going to stop taking the alendronate.                        This could be aggravating your cough.                 Return in about 6 months (around 2/20/2020) for BP Recheck, medication review and refills, follow up of several issues.         End of Life Planning:  Patient currently has an advanced directive: Yes.  Practitioner is supportive of decision.    COUNSELING:  Reviewed preventive health counseling, as reflected in patient instructions  Special attention given to:       Regular exercise       Healthy diet/nutrition    Estimated body mass index is 29.45 kg/m  as calculated from the following:    Height as of 4/2/19: 1.575 m (5' 2\").    Weight as of 4/2/19: 73 kg " (161 lb).         reports that she has never smoked. She has never used smokeless tobacco.      Appropriate preventive services were discussed with this patient, including applicable screening as appropriate for cardiovascular disease, diabetes, osteopenia/osteoporosis, and glaucoma.  As appropriate for age/gender, discussed screening for colorectal cancer, prostate cancer, breast cancer, and cervical cancer. Checklist reviewing preventive services available has been given to the patient.    Reviewed patients plan of care and provided an AVS. The Basic Care Plan (routine screening as documented in Health Maintenance) for Aracelis meets the Care Plan requirement. This Care Plan has been established and reviewed with the Patient and .    Counseling Resources:  ATP IV Guidelines  Pooled Cohorts Equation Calculator  Breast Cancer Risk Calculator  FRAX Risk Assessment  ICSI Preventive Guidelines  Dietary Guidelines for Americans, 2010  [employer]'s MyPlate  ASA Prophylaxis  Lung CA Screening    Truman Palma MD  [facility]  Recent Results (from the past 48 hour(s))   XR Chest 2 Views    Narrative    CHEST TWO VIEWS 8/20/2019 4:20 PM     HISTORY: Chronic cough.    COMPARISON: December 12, 2017     FINDINGS: There are no acute infiltrates. The cardiac silhouette is  not enlarged. Pulmonary vasculature is unremarkable. Stable nodular  densities at the left base.      Impression    IMPRESSION: No acute disease.    RIN YORK MD       Results for orders placed or performed in visit on 08/20/19   Comprehensive metabolic panel (BMP + Alb, Alk Phos, ALT, AST, Total. Bili, TP)   Result Value Ref Range    Sodium 139 133 - 144 mmol/L    Potassium 4.3 3.4 - 5.3 mmol/L    Chloride 102 94 - 109 mmol/L    Carbon Dioxide 30 20 - 32 mmol/L    Anion Gap 7 3 - 14 mmol/L    Glucose 94 70 - 99 mg/dL    Urea Nitrogen 17 7 - 30 mg/dL    Creatinine 0.72 0.52 - 1.04 mg/dL    GFR Estimate 70 >60  mL/min/[1.73_m2]    GFR Estimate If Black 81 >60 mL/min/[1.73_m2]    Calcium 9.3 8.5 - 10.1 mg/dL    Bilirubin Total 0.5 0.2 - 1.3 mg/dL    Albumin 3.7 3.4 - 5.0 g/dL    Protein Total 7.1 6.8 - 8.8 g/dL    Alkaline Phosphatase 63 40 - 150 U/L    ALT 18 0 - 50 U/L    AST 14 0 - 45 U/L   CBC with platelets and differential   Result Value Ref Range    WBC 5.9 4.0 - 11.0 10e9/L    RBC Count 4.63 3.8 - 5.2 10e12/L    Hemoglobin 13.4 11.7 - 15.7 g/dL    Hematocrit 39.7 35.0 - 47.0 %    MCV 86 78 - 100 fl    MCH 28.9 26.5 - 33.0 pg    MCHC 33.8 31.5 - 36.5 g/dL    RDW 11.9 10.0 - 15.0 %    Platelet Count 197 150 - 450 10e9/L    % Neutrophils 56.9 %    % Lymphocytes 27.7 %    % Monocytes 10.8 %    % Eosinophils 4.1 %    % Basophils 0.5 %    Absolute Neutrophil 3.3 1.6 - 8.3 10e9/L    Absolute Lymphocytes 1.6 0.8 - 5.3 10e9/L    Absolute Monocytes 0.6 0.0 - 1.3 10e9/L    Absolute Eosinophils 0.2 0.0 - 0.7 10e9/L    Absolute Basophils 0.0 0.0 - 0.2 10e9/L    Diff Method Automated Method    Lipid Profile (Chol, Trig, HDL, LDL calc)   Result Value Ref Range    Cholesterol 213 (H) <200 mg/dL    Triglycerides 136 <150 mg/dL    HDL Cholesterol 47 (L) >49 mg/dL    LDL Cholesterol Calculated 139 (H) <100 mg/dL    Non HDL Cholesterol 166 (H) <130 mg/dL     Letter sent.                   Your lab results are good,including the kidney,liver,bone marrow,glucose,potassium,and the cholesterol numbers.            Keep taking the same medications.         Your chest xray result was good.

## 2019-08-21 LAB
ALBUMIN SERPL-MCNC: 3.7 G/DL (ref 3.4–5)
ALP SERPL-CCNC: 63 U/L (ref 40–150)
ALT SERPL W P-5'-P-CCNC: 18 U/L (ref 0–50)
ANION GAP SERPL CALCULATED.3IONS-SCNC: 7 MMOL/L (ref 3–14)
AST SERPL W P-5'-P-CCNC: 14 U/L (ref 0–45)
BILIRUB SERPL-MCNC: 0.5 MG/DL (ref 0.2–1.3)
BUN SERPL-MCNC: 17 MG/DL (ref 7–30)
CALCIUM SERPL-MCNC: 9.3 MG/DL (ref 8.5–10.1)
CHLORIDE SERPL-SCNC: 102 MMOL/L (ref 94–109)
CHOLEST SERPL-MCNC: 213 MG/DL
CO2 SERPL-SCNC: 30 MMOL/L (ref 20–32)
CREAT SERPL-MCNC: 0.72 MG/DL (ref 0.52–1.04)
GFR SERPL CREATININE-BSD FRML MDRD: 70 ML/MIN/{1.73_M2}
GLUCOSE SERPL-MCNC: 94 MG/DL (ref 70–99)
HDLC SERPL-MCNC: 47 MG/DL
LDLC SERPL CALC-MCNC: 139 MG/DL
NONHDLC SERPL-MCNC: 166 MG/DL
POTASSIUM SERPL-SCNC: 4.3 MMOL/L (ref 3.4–5.3)
PROT SERPL-MCNC: 7.1 G/DL (ref 6.8–8.8)
SODIUM SERPL-SCNC: 139 MMOL/L (ref 133–144)
TRIGL SERPL-MCNC: 136 MG/DL

## 2019-08-23 DIAGNOSIS — F32.A DEPRESSION, UNSPECIFIED DEPRESSION TYPE: ICD-10-CM

## 2019-08-26 NOTE — TELEPHONE ENCOUNTER
"Prescription approved per [facility] [place].      Requested Prescriptions   Pending Prescriptions Disp Refills     sertraline (ZOLOFT) 50 MG tablet [Pharmacy Med Name: SERTRALINE HCL 50 MG TABLET]  Last Written Prescription Date:  6/3/2019  Last Fill Quantity: 90,  # refills: 0   Last office visit: 8/20/2019 with prescribing provider:  Louie   Future Office Visit:     90 tablet 0     Sig: TAKE 1 TABLET BY MOUTH EVERY DAY       SSRIs Protocol Passed - 8/23/2019  9:56 AM        Passed - PHQ-9 score less than 5 in past 6 months     Please review last PHQ-9 score.           Passed - Medication is active on med list        Passed - Patient is age 18 or older        Passed - No active pregnancy on record        Passed - No positive pregnancy test in last 12 months        Passed - Recent (6 mo) or future (30 days) visit within the authorizing provider's specialty     Patient had office visit in the last 6 months or has a visit in the next 30 days with authorizing provider or within the authorizing provider's specialty.  See \"Patient Info\" tab in inbasket, or \"Choose Columns\" in Meds & Orders section of the refill encounter.              "

## 2019-10-10 DIAGNOSIS — I10 HYPERTENSION, UNSPECIFIED TYPE: ICD-10-CM

## 2019-10-10 NOTE — TELEPHONE ENCOUNTER
"Requested Prescriptions   Pending Prescriptions Disp Refills     aspirin (ASA) 81 MG EC tablet [Pharmacy Med Name: ASPIRIN EC 81 MG TABLET]    Last Written Prescription Date:  12/12/2017  Last Fill Quantity: 90 tablet,  # refills: 3   Last office visit: 8/20/2019 with prescribing provider:  Louie   Future Office Visit:     90 tablet 3     Sig: TAKE 1 TABLET BY MOUTH EVERY DAY WITH FOOD       Analgesics (Non-Narcotic Tylenol and ASA Only) Passed - 10/10/2019  9:22 AM        Passed - Recent (12 mo) or future (30 days) visit within the authorizing provider's specialty     Patient has had an office visit with the authorizing provider or a provider within the authorizing providers department within the previous 12 mos or has a future within next 30 days. See \"Patient Info\" tab in inbasket, or \"Choose Columns\" in Meds & Orders section of the refill encounter.              Passed - Patient is age 20 years or older     If ASA is flagged for ages under 20 years old. Forward to provider for confirmation Ryes Syndrome is not a concern.              Passed - Medication is active on med list           "

## 2019-11-08 DIAGNOSIS — M81.0 OSTEOPOROSIS WITHOUT CURRENT PATHOLOGICAL FRACTURE, UNSPECIFIED OSTEOPOROSIS TYPE: ICD-10-CM

## 2019-11-08 NOTE — TELEPHONE ENCOUNTER
"Requested Prescriptions   Pending Prescriptions Disp Refills     Multiple Vitamin (DAILY-NETTA) TABS [Pharmacy Med Name: DAILY-NETTA TABLET]    Last Written Prescription Date:  05/22/2019  Last Fill Quantity: 90 tablet,  # refills: 1   Last office visit: 8/20/2019 with prescribing provider:  Louie   Future Office Visit:     90 tablet 1     Sig: TAKE 1 TABLET BY MOUTH EVERY DAY       Vitamin Supplements (Adult) Protocol Passed - 11/8/2019 10:01 AM        Passed - High dose Vitamin D not ordered        Passed - Recent (12 mo) or future (30 days) visit within the authorizing provider's specialty     Patient has had an office visit with the authorizing provider or a provider within the authorizing providers department within the previous 12 mos or has a future within next 30 days. See \"Patient Info\" tab in inbasket, or \"Choose Columns\" in Meds & Orders section of the refill encounter.              Passed - Medication is active on med list           "

## 2019-11-11 RX ORDER — MULTIVITAMIN WITH FOLIC ACID 400 MCG
TABLET ORAL
Qty: 90 TABLET | Refills: 2 | Status: SHIPPED | OUTPATIENT
Start: 2019-11-11 | End: 2020-07-21

## 2019-11-22 DIAGNOSIS — F32.A DEPRESSION, UNSPECIFIED DEPRESSION TYPE: ICD-10-CM

## 2019-11-22 NOTE — TELEPHONE ENCOUNTER
"Requested Prescriptions   Pending Prescriptions Disp Refills     sertraline (ZOLOFT) 50 MG tablet [Pharmacy Med Name: SERTRALINE HCL 50 MG TABLET]  Last Written Prescription Date:  8/26/2019  Last Fill Quantity: 90 tablet,  # refills: 0   Last office visit: 8/20/2019 with prescribing provider:  Louie   Future Office Visit:     90 tablet 0     Sig: TAKE 1 TABLET BY MOUTH EVERY DAY       SSRIs Protocol Passed - 11/22/2019  9:50 AM        Passed - PHQ-9 score less than 5 in past 6 months     Please review last PHQ-9 score.   PHQ-9 SCORE 12/12/2017 4/2/2019 8/20/2019   PHQ-9 Total Score MyChart 4 (Minimal depression) - -   PHQ-9 Total Score 4 0 0     MIRA-7 SCORE 12/12/2017   Total Score 1 (minimal anxiety)   Total Score 1             Passed - Medication is active on med list        Passed - Patient is age 18 or older        Passed - No active pregnancy on record        Passed - No positive pregnancy test in last 12 months        Passed - Recent (6 mo) or future (30 days) visit within the authorizing provider's specialty     Patient had office visit in the last 6 months or has a visit in the next 30 days with authorizing provider or within the authorizing provider's specialty.  See \"Patient Info\" tab in inbasket, or \"Choose Columns\" in Meds & Orders section of the refill encounter.               "

## 2019-12-26 ENCOUNTER — HOSPITAL ENCOUNTER (EMERGENCY)
Facility: CLINIC | Age: 84
Discharge: HOME | End: 2019-12-26
Attending: EMERGENCY MEDICINE | Admitting: EMERGENCY MEDICINE
Payer: COMMERCIAL

## 2019-12-26 VITALS
BODY MASS INDEX: 31.83 KG/M2 | DIASTOLIC BLOOD PRESSURE: 84 MMHG | SYSTOLIC BLOOD PRESSURE: 150 MMHG | OXYGEN SATURATION: 92 % | HEIGHT: 60 IN | TEMPERATURE: 97.8 F | HEART RATE: 90 BPM

## 2019-12-26 DIAGNOSIS — R42 VERTIGO: ICD-10-CM

## 2019-12-26 DIAGNOSIS — H61.21 IMPACTED CERUMEN OF RIGHT EAR: ICD-10-CM

## 2019-12-26 LAB
ANION GAP SERPL CALCULATED.3IONS-SCNC: 7 MMOL/L (ref 3–14)
BASOPHILS # BLD AUTO: 0 10E9/L (ref 0–0.2)
BASOPHILS NFR BLD AUTO: 0.1 %
BUN SERPL-MCNC: 10 MG/DL (ref 7–30)
CALCIUM SERPL-MCNC: 8.9 MG/DL (ref 8.5–10.1)
CHLORIDE SERPL-SCNC: 99 MMOL/L (ref 94–109)
CO2 SERPL-SCNC: 27 MMOL/L (ref 20–32)
CREAT SERPL-MCNC: 0.62 MG/DL (ref 0.52–1.04)
DIFFERENTIAL METHOD BLD: NORMAL
EOSINOPHIL # BLD AUTO: 0.2 10E9/L (ref 0–0.7)
EOSINOPHIL NFR BLD AUTO: 1.8 %
ERYTHROCYTE [DISTWIDTH] IN BLOOD BY AUTOMATED COUNT: 11.9 % (ref 10–15)
GFR SERPL CREATININE-BSD FRML MDRD: 75 ML/MIN/{1.73_M2}
GLUCOSE SERPL-MCNC: 121 MG/DL (ref 70–99)
HCT VFR BLD AUTO: 38.1 % (ref 35–47)
HGB BLD-MCNC: 12.7 G/DL (ref 11.7–15.7)
IMM GRANULOCYTES # BLD: 0 10E9/L (ref 0–0.4)
IMM GRANULOCYTES NFR BLD: 0.1 %
LYMPHOCYTES # BLD AUTO: 1.1 10E9/L (ref 0.8–5.3)
LYMPHOCYTES NFR BLD AUTO: 13.4 %
MCH RBC QN AUTO: 28.3 PG (ref 26.5–33)
MCHC RBC AUTO-ENTMCNC: 33.3 G/DL (ref 31.5–36.5)
MCV RBC AUTO: 85 FL (ref 78–100)
MONOCYTES # BLD AUTO: 0.4 10E9/L (ref 0–1.3)
MONOCYTES NFR BLD AUTO: 4.6 %
NEUTROPHILS # BLD AUTO: 6.6 10E9/L (ref 1.6–8.3)
NEUTROPHILS NFR BLD AUTO: 80 %
NRBC # BLD AUTO: 0 10*3/UL
NRBC BLD AUTO-RTO: 0 /100
PLATELET # BLD AUTO: 185 10E9/L (ref 150–450)
POTASSIUM SERPL-SCNC: 3.7 MMOL/L (ref 3.4–5.3)
RBC # BLD AUTO: 4.49 10E12/L (ref 3.8–5.2)
SODIUM SERPL-SCNC: 133 MMOL/L (ref 133–144)
WBC # BLD AUTO: 8.3 10E9/L (ref 4–11)

## 2019-12-26 PROCEDURE — 80048 BASIC METABOLIC PNL TOTAL CA: CPT | Performed by: EMERGENCY MEDICINE

## 2019-12-26 PROCEDURE — 25000128 H RX IP 250 OP 636: Performed by: EMERGENCY MEDICINE

## 2019-12-26 PROCEDURE — 25000132 ZZH RX MED GY IP 250 OP 250 PS 637: Performed by: EMERGENCY MEDICINE

## 2019-12-26 PROCEDURE — 85025 COMPLETE CBC W/AUTO DIFF WBC: CPT | Performed by: EMERGENCY MEDICINE

## 2019-12-26 PROCEDURE — 69209 REMOVE IMPACTED EAR WAX UNI: CPT | Mod: RT

## 2019-12-26 PROCEDURE — 96374 THER/PROPH/DIAG INJ IV PUSH: CPT

## 2019-12-26 PROCEDURE — 99284 EMERGENCY DEPT VISIT MOD MDM: CPT | Mod: 25

## 2019-12-26 PROCEDURE — 96375 TX/PRO/DX INJ NEW DRUG ADDON: CPT

## 2019-12-26 RX ORDER — MECLIZINE HYDROCHLORIDE 25 MG/1
25 TABLET ORAL 3 TIMES DAILY PRN
Qty: 15 TABLET | Refills: 0 | Status: SHIPPED | OUTPATIENT
Start: 2019-12-26 | End: 2020-11-22

## 2019-12-26 RX ORDER — ACETAMINOPHEN 160 MG
100 TABLET,DISINTEGRATING ORAL ONCE
Status: DISCONTINUED | OUTPATIENT
Start: 2019-12-26 | End: 2019-12-26 | Stop reason: HOSPADM

## 2019-12-26 RX ORDER — MECLIZINE HYDROCHLORIDE 25 MG/1
25 TABLET ORAL ONCE
Status: COMPLETED | OUTPATIENT
Start: 2019-12-26 | End: 2019-12-26

## 2019-12-26 RX ORDER — DIPHENHYDRAMINE HYDROCHLORIDE 50 MG/ML
25 INJECTION INTRAMUSCULAR; INTRAVENOUS ONCE
Status: COMPLETED | OUTPATIENT
Start: 2019-12-26 | End: 2019-12-26

## 2019-12-26 RX ORDER — ONDANSETRON 4 MG/1
4 TABLET, ORALLY DISINTEGRATING ORAL EVERY 4 HOURS PRN
Qty: 10 TABLET | Refills: 0 | Status: SHIPPED | OUTPATIENT
Start: 2019-12-26 | End: 2019-12-31

## 2019-12-26 RX ADMIN — PROCHLORPERAZINE EDISYLATE 5 MG: 5 INJECTION INTRAMUSCULAR; INTRAVENOUS at 18:06

## 2019-12-26 RX ADMIN — DIPHENHYDRAMINE HYDROCHLORIDE 25 MG: 50 INJECTION, SOLUTION INTRAMUSCULAR; INTRAVENOUS at 18:06

## 2019-12-26 RX ADMIN — MECLIZINE HYDROCHLORIDE 25 MG: 25 TABLET ORAL at 18:06

## 2019-12-26 ASSESSMENT — ENCOUNTER SYMPTOMS
DIZZINESS: 1
HEADACHES: 0
SPEECH DIFFICULTY: 0
NUMBNESS: 0
WEAKNESS: 0
VOMITING: 1
NAUSEA: 1
TROUBLE SWALLOWING: 0

## 2019-12-26 NOTE — ED TRIAGE NOTES
Per daughter patient has been feeling dizzy the last two days. Today patient began feeling nauseous and vomited once. EMS gave her 4 of zofran in route. Patient speaks farsi and daughter is translating

## 2019-12-26 NOTE — ED PROVIDER NOTES
History     Chief Complaint:  Nausea & Vomiting and Dizziness    Farsi interpretation provided via family at bedside.     HPI   Aracelis Blakely is a 89 year old female who presents with nausea, vomiting, and dizziness. The patient reports onset of symptoms 3 days ago, constant in nature, prompting her presentation. Here, the patient endorses that her nausea has subsided, but the dizziness is present, exacerbated by movement. She states that she has felt this sensation before. She denies any double vision, headache, weakness or numbness in one extremity, difficulty with speech, or trouble swallowing.     Allergies:  No known drug allergies     Medications:    Albuterol  Amlodipine  81 mg Aspirin  Omeprazole  Sertraline  Ambien  Lisinopril  Escitalopram  Budesonide    Past Medical History:    Hypertension  Bilateral cataracts  Depression  Respiratory distress  Chronic constipation  GERD  DJD  Insomnia  Oxygen deficiency    Past Surgical History:    Cholecystectomy  Release carpal tunnel    Family History:    Throat cancer  Kidney problems  Stomach cancer    Social History:  Smoking status: Never smoker  Alcohol use: No  Drug use: No  PCP: Truman Palma  Presents to the ED with family   Marital Status:        Review of Systems   HENT: Negative for trouble swallowing.    Eyes: Negative for visual disturbance.   Gastrointestinal: Positive for nausea and vomiting.   Neurological: Positive for dizziness. Negative for speech difficulty, weakness, numbness and headaches.   All other systems reviewed and are negative.      Physical Exam     Patient Vitals for the past 24 hrs:   BP Temp Temp src Pulse SpO2 Height   12/26/19 1800 (!) 150/84 -- -- 90 92 % --   12/26/19 1722 -- 97.8  F (36.6  C) Oral -- -- 1.524 m (5')   12/26/19 1721 (!) 181/98 97.8  F (36.6  C) -- 89 91 % --   12/26/19 1715 (!) 181/98 -- -- 92 90 % --       Physical Exam  Constitutional:  Elderly Maltese female. Supine.   HENT:  Right EAC  cerumen occluded. Left TM normal. The patient has no signs of trauma.  EYES:  3 mm pupils. No obvious nystagmus. The patient has PERRL.  EOM are normal.  NECK:  No carotid bruits. Range of motion is normal.  No tracheal deviation.  No JVD.  The patient has no cervical adenopathy.  CARDIOVASCULAR:  The patient has regular rhythm.   No murmur present.  No rub, no gallop present.  PUL/CHEST WALL:  Bilateral breath sounds are normal.  The patient has no wheezes, rales, or rhonchi.  ABDOMINAL:  Soft. No tenderness. No rebound or guarding.   MUSCULOSKELETAL:  No edema present.  No tenderness is present.  LYMPHADENOPATHY: No lymphadenopathy.   NEUROLOGIC:  No facial asymmetry. Fluent speech. Normal finger-nose-finger. Normal sensation to light touch. The patient is alert and oriented x3.  The patient has normal coordination and normal strength.  SKIN:  The patient's skin is warm and dry.  No erythema or rash present.    Emergency Department Course   Laboratory:  BMP: Glucose 121, Creatinine 0.62  CBC: WBC 8.3, HGB 12.7,     Interventions:  1806: 5 mg Compazine IV  1806: 25 mg Benadryl IV  1806: 25 mg Antivert PO    Emergency Department Course:  Past medical records, nursing notes, and vitals reviewed.  1739: I performed an exam of the patient and obtained history, as documented above.     IV inserted and blood drawn.    1955: I rechecked the patient. Explained findings to patient.    Findings and plan explained to the patient. Patient discharged home with instructions regarding supportive care, medications, and reasons to return. The importance of close follow-up was reviewed. The patient was prescribed Meclizine and Zofran.    Impression & Plan    Medical Decision Making:  Aracelis Blakely is a 89 year old female who presents dizziness, a room spinning and head spinning sensation for the past 2-3 days. She has had this before. She has had some vomiting with this. When she turns her head it is worse. She has  no headache, no blurry vision, no speech or swallowing problems. No focal numbness or weakness. She is on medications for high blood pressure and cholesterol. On exam, she is neurologically intact. I do not appreciate any nystagmus or any focal neurological deficits. Her right ear canal is impacted with wax. The patient had some basic labs obtained. She received Meclizine, along with Compazine and Benadryl. She rested and is now doing much better. She is able to drink fluids and ambulate without problem. We did have tech also clean out her ear and got a lot of wax out. Symptoms are consistent with vertigo, probably benign positional type. I think this is unlikely to be a stroke. She does take a baby Aspirin daily. The patient will be given Meclizine and Zofran prescriptions. If symptoms persist, she could follow-up with her primary. If they worsen, recheck in the emergency department. I did discuss getting a CT or an MRI with her family, but they thought that this would be too much to do at this time.     Diagnosis:    ICD-10-CM   1. Vertigo R42   2. Impacted cerumen of right ear H61.21       Disposition: Discharged to home.    Discharge Medications:  New Prescriptions    MECLIZINE (ANTIVERT) 25 MG TABLET    Take 1 tablet (25 mg) by mouth 3 times daily as needed for dizziness    ONDANSETRON (ZOFRAN ODT) 4 MG ODT TAB    Take 1 tablet (4 mg) by mouth every 4 hours as needed for nausea     IVera, am serving as a scribe at 5:39 PM on 12/26/2019 to document services personally performed by Deniz Wilder MD based on my observations and the provider's statements to me.     Vera Hammer  12/26/2019    EMERGENCY DEPARTMENT       Deniz Wilder MD  12/27/19 0049

## 2019-12-26 NOTE — ED AVS SNAPSHOT
Emergency Department  [address] [facility] [address]  Phone:  [phone]  Fax:  [phone]                                    Aracelis Blakely   MRN: [id number]    Department:   Emergency Department   Date of Visit:  12/26/2019           After Visit Summary Signature Page    I have received my discharge instructions, and my questions have been answered. I have discussed any challenges I see with this plan with the nurse or doctor.    ..........................................................................................................................................  Patient/Patient Representative Signature      ..........................................................................................................................................  Patient Representative Print Name and Relationship to Patient    ..................................................               ................................................  Date                                   Time    ..........................................................................................................................................  Reviewed by Signature/Title    ...................................................              ..............................................  Date                                               Time          22EPIC Rev 08/18

## 2019-12-26 NOTE — ED NOTES
Bed: ED15  Expected date:   Expected time:   Means of arrival:   Comments:  E2  89 F vomiting  1715

## 2019-12-27 ENCOUNTER — PATIENT OUTREACH (OUTPATIENT)
Dept: GERIATRIC MEDICINE | Facility: CLINIC | Age: 84
End: 2019-12-27

## 2019-12-27 NOTE — ED NOTES
Pt's ear irrigated with H2O2, soap and saline. Pt tolerated well, several pieces of wax were removed. Pt drank Apple Juice w/o nausea and walked in joseph using walker. Pt walked with slight lightheadedness, but w/o assistance.

## 2019-12-27 NOTE — PROGRESS NOTES
CC received notification of Emergency Room visit.  ER visit occurred on 12/27/19 at [facility] with Dx of nausea and vomiting, dizziness.    CC contacted adult daughter Yoselin and reviewed discharge summary.  Member has a follow-up appointment with PCP: No: Offered Assistance with setting up a follow up appointment. Preferred  is assisting with scheduling a f/u appt that works for [employer] and the . Writer offered assistance if needed.  Member has had a change in condition: No  New referrals placed: No  Home Visit Needed: No  Care plan reviewed and updated.  PCP notified of ED visit via EMR.    MARTA Lino  [facility]  [phone]  Fax: [phone]

## 2019-12-31 ENCOUNTER — OFFICE VISIT (OUTPATIENT)
Dept: FAMILY MEDICINE | Facility: CLINIC | Age: 84
End: 2019-12-31
Payer: COMMERCIAL

## 2019-12-31 VITALS
DIASTOLIC BLOOD PRESSURE: 85 MMHG | SYSTOLIC BLOOD PRESSURE: 144 MMHG | WEIGHT: 158 LBS | TEMPERATURE: 97.8 F | HEART RATE: 83 BPM | BODY MASS INDEX: 30.86 KG/M2 | OXYGEN SATURATION: 94 %

## 2019-12-31 DIAGNOSIS — H81.10 BENIGN PAROXYSMAL POSITIONAL VERTIGO, UNSPECIFIED LATERALITY: Primary | ICD-10-CM

## 2019-12-31 PROCEDURE — 99213 OFFICE O/P EST LOW 20 MIN: CPT | Performed by: NURSE PRACTITIONER

## 2019-12-31 RX ORDER — MECLIZINE HCL 12.5 MG 12.5 MG/1
12.5 TABLET ORAL 3 TIMES DAILY PRN
Qty: 30 TABLET | Refills: 0 | Status: SHIPPED | OUTPATIENT
Start: 2019-12-31 | End: 2020-11-22

## 2019-12-31 NOTE — PROGRESS NOTES
Subjective     Aracelis Blakely is a 89 year old female who presents to clinic today for the following health issues:    HPI   ED/UC Followup:    Facility:   ED  Date of visit: 12/26/19  Reason for visit: vertigo  Current Status: little bit better.  Has been taking meclizine 25mg tid and it is making her tired        Patient Active Problem List   Diagnosis     Insomnia, unspecified type     Hypertension, unspecified type     Gastroesophageal reflux disease without esophagitis     Osteoporosis without current pathological fracture, unspecified osteoporosis type     Health Care Home     Chronic cough     Constipation, unspecified constipation type     Depression, unspecified depression type     Falls frequently     Poor balance     Varicose veins of both lower extremities, unspecified whether complicated     Past Surgical History:   Procedure Laterality Date     CHOLECYSTECTOMY       RELEASE CARPAL TUNNEL Right 6/13/2016       Social History     Tobacco Use     Smoking status: Never Smoker     Smokeless tobacco: Never Used   Substance Use Topics     Alcohol use: No     Family History   Problem Relation Age of Onset     Unknown/Adopted Mother      Unknown/Adopted Father          Current Outpatient Medications   Medication Sig Dispense Refill     acetaminophen (TYLENOL) 325 MG tablet Take 325-650 mg by mouth       albuterol (2.5 MG/3ML) 0.083% neb solution Inhale 2.5 mg into the lungs       amLODIPine (NORVASC) 2.5 MG tablet Take 1 tablet (2.5 mg) by mouth daily 90 tablet 3     Ascorbic Acid (VITAMIN C/BIOFLAVONOIDS/ROSEHP PO) Take by mouth daily       aspirin (ASA) 81 MG EC tablet TAKE 1 TABLET BY MOUTH EVERY DAY WITH FOOD 90 tablet 2     azelastine (OPTIVAR) 0.05 % SOLN ophthalmic solution Place 1 drop into both eyes 2 times daily       cetirizine (ZYRTEC) 10 MG tablet Take 10 mg by mouth       cholecalciferol (VITAMIN D3) 1000 UNIT tablet Take by mouth daily       cyanocobalamin 1000 MCG TABS        meclizine  (ANTIVERT) 12.5 MG tablet Take 1 tablet (12.5 mg) by mouth 3 times daily as needed for dizziness 30 tablet 0     Multiple Vitamin (DAILY-NETTA) TABS TAKE 1 TABLET BY MOUTH EVERY DAY 90 tablet 2     Omega-3 Fatty Acids (OMEGA-3 FISH OIL PO)        omeprazole (PRILOSEC) 20 MG DR capsule TAKE 1 CAPSULE BY MOUTH EVERY DAY BEFORE A MEAL 90 capsule 3     order for DME WALKER; with wheels, and brakes, and a seat. 1 Device 0     order for DME Equipment being ordered:  Compression stockings,knee high,mild compression 3 Units 3     order for DME Knee high compression stockings.  Light compression.  16-20mm Hg.  Ok 3 pairs.       OVER-THE-COUNTER 550 mg 3 times daily as needed (Takes 3x daily when senokot isn't available or if senokot doesn't work for her.)       senna (SENOKOT) 8.6 MG tablet TAKE THREE TABLETS BY MOUTH DAILY 270 tablet 2     sertraline (ZOLOFT) 50 MG tablet TAKE 1 TABLET BY MOUTH EVERY DAY 90 tablet 0     zolpidem (AMBIEN) 5 MG tablet TAKE 1/2 TABLET BY MOUTH AT BEDTIME AS NEEDED FOR SLEEP 15 tablet 0     meclizine (ANTIVERT) 25 MG tablet Take 1 tablet (25 mg) by mouth 3 times daily as needed for dizziness (Patient not taking: Reported on 12/31/2019) 15 tablet 0     OYSTER SHELL CALCIUM/D 500-200 MG-UNIT tablet TAKE 1 TABLET BY MOUTH TWICE A DAY WITH MEALS. 180 tablet 1     No Known Allergies    Reviewed and updated as needed this visit by Provider         Review of Systems   ROS COMP: Constitutional, HEENT, cardiovascular, pulmonary, gi and gu systems are negative, except as otherwise noted.      Objective      BP (!) 144/85 (BP Location: Left arm, Cuff Size: Adult Regular)   Pulse 83   Temp 97.8  F (36.6  C) (Tympanic)   Wt 71.7 kg (158 lb)   SpO2 94%   BMI 30.86 kg/m      Physical Exam   GENERAL:  alert and no distress  EYES: Eyes grossly normal to inspection, PERRL and conjunctivae and sclerae normal  HENT: ear canals and TM's normal, nose and mouth without ulcers or lesions  NECK: no adenopathy, no  asymmetry, masses, or scars and thyroid normal to palpation  RESP: lungs clear to auscultation - no rales, rhonchi or wheezes  CV: regular rate and rhythm, normal S1 S2, no S3 or S4, no murmur, click or rub, no peripheral edema and peripheral pulses strong  MS: no gross musculoskeletal defects noted, no edema  NEURO: Normal strength and tone, mentation intact and speech normal, normal gait and coordination  PSYCH: mentation appears normal, affect normal/bright    Diagnostic Test Results:  Labs reviewed in Epic        Assessment & Plan       ICD-10-CM    1. Benign paroxysmal positional vertigo, unspecified laterality H81.10 meclizine (ANTIVERT) 12.5 MG tablet     Continue meclizine at 1/2 the dose to minimize sleepiness  If not continuing to improve please follow up with us for possible referral to physical therapy vestibular program          MEHRAN Gillespie [facility]

## 2019-12-31 NOTE — PROGRESS NOTES
[facility] Care Coordination Contact      [facility] Six-Month Telephone Assessment    6 month telephone assessment completed on 12/27/19.    ER visits: Yes -  [facility]  Hospitalizations: No  TCU stays: No  Significant health status changes: None reported  Falls/Injuries: No  ADL/IADL changes: No  Changes in services: No    Caregiver Assessment follow up:  Completed with gerri Wyatt    Goals: See POC in chart for goal progress documentation.      Will see member in 6 months for an annual health risk assessment.   Encouraged member to call CC with any questions or concerns in the meantime.     MARTA Lino  [facility]  [phone]  Fax: [phone]

## 2020-01-30 DIAGNOSIS — M81.0 OSTEOPOROSIS WITHOUT CURRENT PATHOLOGICAL FRACTURE, UNSPECIFIED OSTEOPOROSIS TYPE: ICD-10-CM

## 2020-01-30 NOTE — TELEPHONE ENCOUNTER
"Requested Prescriptions   Pending Prescriptions Disp Refills     OMARIN SHELL CALCIUM/D 500-200 MG-UNIT tablet [Pharmacy Med Name: OREED SHELL JINA-VIT D 500-200 TB]    Last Written Prescription Date:  02/15/2019  Last Fill Quantity: 180 tablet,  # refills: 3   Last office visit: 8/20/2019 with prescribing provider:  Louie   Future Office Visit:     180 tablet 3     Sig: TAKE 1 TABLET BY MOUTH TWICE A DAY WITH MEALS.       Vitamin Supplements (Adult) Protocol Passed - 1/30/2020  2:11 AM        Passed - High dose Vitamin D not ordered        Passed - Recent (12 mo) or future (30 days) visit within the authorizing provider's specialty     Patient has had an office visit with the authorizing provider or a provider within the authorizing providers department within the previous 12 mos or has a future within next 30 days. See \"Patient Info\" tab in inbasket, or \"Choose Columns\" in Meds & Orders section of the refill encounter.              Passed - Medication is active on med list           "

## 2020-02-08 DIAGNOSIS — K59.00 CONSTIPATION, UNSPECIFIED CONSTIPATION TYPE: ICD-10-CM

## 2020-02-08 DIAGNOSIS — F32.A DEPRESSION, UNSPECIFIED DEPRESSION TYPE: ICD-10-CM

## 2020-02-10 RX ORDER — SENNOSIDES A AND B 8.6 MG/1
TABLET, FILM COATED ORAL
Qty: 90 TABLET | Refills: 0 | Status: SHIPPED | OUTPATIENT
Start: 2020-02-10 | End: 2020-02-10

## 2020-02-10 RX ORDER — SENNOSIDES A AND B 8.6 MG/1
3 TABLET, FILM COATED ORAL DAILY
Qty: 90 TABLET | Refills: 0 | OUTPATIENT
Start: 2020-02-10

## 2020-02-10 NOTE — TELEPHONE ENCOUNTER
"Requested Prescriptions   Pending Prescriptions Disp Refills     sertraline (ZOLOFT) 50 MG tablet [Pharmacy Med Name: SERTRALINE HCL 50 MG TABLET]  Last Written Prescription Date:  11/22/2019  Last Fill Quantity: 90 tablet,  # refills: 0   Last office visit: 8/20/2019 with prescribing provider:  Louie   Future Office Visit:   Next 5 appointments (look out 90 days)    Feb 13, 2020  1:30 PM CST  Office Visit with MEHRAN Gillespie CNP  [facility] [address] [facility] [address]  [phone]          90 tablet 0     Sig: TAKE 1 TABLET BY MOUTH EVERY DAY       SSRIs Protocol Passed - 2/8/2020  3:14 PM        Passed - PHQ-9 score less than 5 in past 6 months     Please review last PHQ-9 score.   PHQ-9 SCORE 12/12/2017 4/2/2019 8/20/2019   PHQ-9 Total Score MyChart 4 (Minimal depression) - -   PHQ-9 Total Score 4 0 0     MIRA-7 SCORE 12/12/2017   Total Score 1 (minimal anxiety)   Total Score 1             Passed - Medication is active on med list        Passed - Patient is age 18 or older        Passed - No active pregnancy on record        Passed - No positive pregnancy test in last 12 months        Passed - Recent (6 mo) or future (30 days) visit within the authorizing provider's specialty     Patient had office visit in the last 6 months or has a visit in the next 30 days with authorizing provider or within the authorizing provider's specialty.  See \"Patient Info\" tab in inbasket, or \"Choose Columns\" in Meds & Orders section of the refill encounter.            senna (SENOKOT) 8.6 MG tablet  Last Written Prescription Date:  6/14/2019  Last Fill Quantity: 270 tablet,  # refills: 2   Last office visit: 12/31/2019 with prescribing provider:  Louie   Future Office Visit:   Next 5 appointments (look out 90 days)    Feb 13, 2020  1:30 PM CST  Office [facility] MEHR[facility] [address] [facility]  [place] " [address]  [phone]             Routing refill request to provider for review/approval because:  Drug not on the [facility] or [facility] refill protocol or controlled substance

## 2020-02-13 ENCOUNTER — OFFICE VISIT (OUTPATIENT)
Dept: FAMILY MEDICINE | Facility: CLINIC | Age: 85
End: 2020-02-13
Payer: COMMERCIAL

## 2020-02-13 VITALS
SYSTOLIC BLOOD PRESSURE: 138 MMHG | TEMPERATURE: 96.7 F | HEART RATE: 88 BPM | OXYGEN SATURATION: 95 % | WEIGHT: 159.6 LBS | DIASTOLIC BLOOD PRESSURE: 70 MMHG | BODY MASS INDEX: 31.17 KG/M2

## 2020-02-13 DIAGNOSIS — K59.00 CONSTIPATION, UNSPECIFIED CONSTIPATION TYPE: ICD-10-CM

## 2020-02-13 DIAGNOSIS — F40.243 FEAR OF FLYING: Primary | ICD-10-CM

## 2020-02-13 DIAGNOSIS — R09.89 CHEST CONGESTION: ICD-10-CM

## 2020-02-13 PROCEDURE — 99213 OFFICE O/P EST LOW 20 MIN: CPT | Performed by: NURSE PRACTITIONER

## 2020-02-13 RX ORDER — ALPRAZOLAM 0.25 MG
0.25 TABLET ORAL 3 TIMES DAILY PRN
Qty: 4 TABLET | Refills: 0 | Status: SHIPPED | OUTPATIENT
Start: 2020-02-13 | End: 2020-11-25

## 2020-02-13 RX ORDER — GUAIFENESIN 600 MG/1
600 TABLET, EXTENDED RELEASE ORAL 2 TIMES DAILY
Qty: 30 TABLET | Refills: 0 | Status: SHIPPED | OUTPATIENT
Start: 2020-02-13 | End: 2020-11-22

## 2020-02-13 RX ORDER — SENNOSIDES 8.6 MG
1 TABLET ORAL DAILY
Qty: 90 TABLET | Refills: 0 | Status: SHIPPED | OUTPATIENT
Start: 2020-02-13 | End: 2020-06-18

## 2020-02-13 NOTE — PATIENT INSTRUCTIONS
If you get a cold and become congested start taking plain mucinex 600mg twice a day and tylenol 500mg 1 pill every 8 hours

## 2020-02-13 NOTE — PROGRESS NOTES
Subjective     Aracelis Blakely is a 89 year old female who presents to clinic today for the following health issues:    HPI     Patient is going to be flying in the next week and gets anxiety with flying. Worried about developing cold symptoms and is wondering if there's anything to take.  Also requesting refill of senekot which she takes tid for constipation  Patient Active Problem List   Diagnosis     Insomnia, unspecified type     Hypertension, unspecified type     Gastroesophageal reflux disease without esophagitis     Osteoporosis without current pathological fracture, unspecified osteoporosis type     Health Care Home     Chronic cough     Constipation, unspecified constipation type     Depression, unspecified depression type     Falls frequently     Poor balance     Varicose veins of both lower extremities, unspecified whether complicated     Past Surgical History:   Procedure Laterality Date     CHOLECYSTECTOMY       RELEASE CARPAL TUNNEL Right 6/13/2016       Social History     Tobacco Use     Smoking status: Never Smoker     Smokeless tobacco: Never Used   Substance Use Topics     Alcohol use: No     Family History   Problem Relation Age of Onset     Unknown/Adopted Mother      Unknown/Adopted Father          Current Outpatient Medications   Medication Sig Dispense Refill     acetaminophen (TYLENOL) 325 MG tablet Take 325-650 mg by mouth       albuterol (2.5 MG/3ML) 0.083% neb solution Inhale 2.5 mg into the lungs       ALPRAZolam (XANAX) 0.25 MG tablet Take 1 tablet (0.25 mg) by mouth 3 times daily as needed for anxiety (take one an hour before flight and then another in [place];  reverse on return flight) 4 tablet 0     amLODIPine (NORVASC) 2.5 MG tablet Take 1 tablet (2.5 mg) by mouth daily 90 tablet 3     Ascorbic Acid (VITAMIN C/BIOFLAVONOIDS/ROSEHP PO) Take by mouth daily       aspirin (ASA) 81 MG EC tablet TAKE 1 TABLET BY MOUTH EVERY DAY WITH FOOD 90 tablet 2     azelastine (OPTIVAR) 0.05 %  SOLN ophthalmic solution Place 1 drop into both eyes 2 times daily       cetirizine (ZYRTEC) 10 MG tablet Take 10 mg by mouth       cholecalciferol (VITAMIN D3) 1000 UNIT tablet Take by mouth daily       cyanocobalamin 1000 MCG TABS        guaiFENesin (MUCINEX) 600 MG 12 hr tablet Take 1 tablet (600 mg) by mouth 2 times daily PRN congestion 30 tablet 0     meclizine (ANTIVERT) 12.5 MG tablet Take 1 tablet (12.5 mg) by mouth 3 times daily as needed for dizziness 30 tablet 0     meclizine (ANTIVERT) 25 MG tablet Take 1 tablet (25 mg) by mouth 3 times daily as needed for dizziness 15 tablet 0     Multiple Vitamin (DAILY-NETTA) TABS TAKE 1 TABLET BY MOUTH EVERY DAY 90 tablet 2     Omega-3 Fatty Acids (OMEGA-3 FISH OIL PO)        omeprazole (PRILOSEC) 20 MG DR capsule TAKE 1 CAPSULE BY MOUTH EVERY DAY BEFORE A MEAL 90 capsule 3     order for DME WALKER; with wheels, and brakes, and a seat. 1 Device 0     order for DME Equipment being ordered:  Compression stockings,knee high,mild compression 3 Units 3     order for DME Knee high compression stockings.  Light compression.  16-20mm Hg.  Ok 3 pairs.       OVER-THE-COUNTER 550 mg 3 times daily as needed (Takes 3x daily when senokot isn't available or if senokot doesn't work for her.)       OYSTER SHELL CALCIUM/D 500-200 MG-UNIT tablet TAKE 1 TABLET BY MOUTH TWICE A DAY WITH MEALS. 180 tablet 1     sennosides (SENOKOT) 8.6 MG tablet Take 1 tablet by mouth daily 90 tablet 0     sertraline (ZOLOFT) 50 MG tablet TAKE 1 TABLET BY MOUTH EVERY DAY 90 tablet 0     zolpidem (AMBIEN) 5 MG tablet TAKE 1/2 TABLET BY MOUTH AT BEDTIME AS NEEDED FOR SLEEP 15 tablet 0     No Known Allergies      Reviewed and updated as needed this visit by Provider       Review of Systems   ROS COMP: Constitutional, HEENT, cardiovascular, pulmonary, gi and gu systems are negative, except as otherwise noted.      Objective    /70   Pulse 88   Temp 96.7  F (35.9  C) (Oral)   Wt 72.4 kg (159 lb 9.6 oz)    SpO2 95%   BMI 31.17 kg/m    Physical Exam   GENERAL: healthy, alert and no distress  EYES: Eyes grossly normal to inspection, PERRL and conjunctivae and sclerae normal  HENT: ear canals and TM's normal, nose and mouth without ulcers or lesions  RESP: lungs clear to auscultation - no rales, rhonchi or wheezes  CV: regular rate and rhythm, normal S1 S2, no S3 or S4, no murmur, click or rub, no peripheral edema and peripheral pulses strong    Diagnostic Test Results:  Labs reviewed in Epic        Assessment & Plan       ICD-10-CM    1. 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 Constipation, unspecified constipation type K59.00 sennosides (SENOKOT) 8.6 MG tablet   3. Chest congestion R09.89 guaiFENesin (MUCINEX) 600 MG 12 hr tablet   discussed leg exercises for prevention of blood clots in legs (DVT)  She will also wear compression hose   And she takes aspirin     Patient Instructions   If you get a cold and become congested start taking plain mucinex 600mg twice a day and tylenol 500mg 1 pill every 8 hours      MEHRAN Gillespie [facility]

## 2020-03-31 ENCOUNTER — PATIENT OUTREACH (OUTPATIENT)
Dept: GERIATRIC MEDICINE | Facility: CLINIC | Age: 85
End: 2020-03-31

## 2020-03-31 NOTE — PROGRESS NOTES
[facility] requested to reach out to client's at high risk for COVID-19. Spoke w/ daughter Yoselin. Daughter reported that Ubaldoileh is doing well. Daughter has been looking for hand  and has not been able to find any. Reminded daughter that hand washing works better and to continue washing hands often.    Flora Luis, [facility]  [phone]  Fax: [phone]

## 2020-04-29 ENCOUNTER — PATIENT OUTREACH (OUTPATIENT)
Dept: GERIATRIC MEDICINE | Facility: CLINIC | Age: 85
End: 2020-04-29

## 2020-04-29 NOTE — PROGRESS NOTES
[facility] Care Coordination Contact    Called  Blanca to schedule annual HRA home visit. HRA has been scheduled for Monday, May 4th at 1:00pm.     MARTA Lino  [facility]  [phone]  Fax: [phone]

## 2020-05-04 ENCOUNTER — PATIENT OUTREACH (OUTPATIENT)
Dept: GERIATRIC MEDICINE | Facility: CLINIC | Age: 85
End: 2020-05-04

## 2020-05-04 DIAGNOSIS — F32.A DEPRESSION, UNSPECIFIED DEPRESSION TYPE: ICD-10-CM

## 2020-05-04 ASSESSMENT — ACTIVITIES OF DAILY LIVING (ADL)
DEPENDENT_IADLS:: CLEANING;COOKING;LAUNDRY;SHOPPING;MONEY MANAGEMENT;MEDICATION MANAGEMENT;MEAL PREPARATION;TRANSPORTATION;INCONTINENCE

## 2020-05-04 NOTE — PROGRESS NOTES
[facility] Care Coordination Contact    [facility] Home Visit Assessment     Telephonic Health Risk Assessment with Aracelis Blakely completed on May 4, 2020    Type of residence:: Apartment - handicap accessible  Current living arrangement:: I live alone     Assessment completed with:: Patient, Other()    Current Care Plan  Member currently receiving the following home care services: NA  Member currently receiving the following community resources: PCA    Medication Review  Medication reconciliation completed in Epic: No, Aracelis does not know what medications she is taking. Unable to review over the phone.  Medication set-up & administration: Does not set up.  Family caregiver administers medications.  Medication Risk Assessment Medication (1 or more, place referral to MTM): N/A: No risk factors identified  MTM Referral Placed: No: No risk factors idenified    Mental/Behavioral Health   Depression Screening: See PHQ assessment flowsheet.   Mental health DX:: Yes   Mental health DX how managed:: Medication  Mental Health Diagnosis: Yes: Depression, unspecified depression type  Mental Health Services: None: No further intervention needed at this time.    Falls Assessment:   Fallen 2 or more times in the past year?: Yes   Any fall with injury in the past year?: No    ADL/IADL Dependencies:   Dependent ADLs:: Ambulation-walker, Bathing, Dressing, Eating, Grooming, Transfers, Incontinence, Toileting  Dependent IADLs:: Cleaning, Cooking, Laundry, Shopping, Money Management, Medication Management, Meal Preparation, Transportation, Incontinence    [facility] Plan sponsored benefits: Shared information re: Silver Sneakers/gym memberships, ASA, Calcium +D.    PCA Assessment completed at visit: Yes     Elderly Waiver Eligibility: Yes, but member declines EW services; will not open to EW    Care Plan & Recommendations: No reported changes or concerns at this time. Daughter Yoselin is Aracelis's  PCA and assists her daily.    See [facility] for detailed assessment information.    Follow-Up Plan: Member informed of future contact, plan to f/u with member with a 6 month telephone assessment.  Contact information shared with member and family, encouraged member to call with any questions or concerns at any time.    [place] care continuum providers: Please refer to Health Care Home on the Epic Problem List to view this patient's [facility] Care Plan Summary.    MARTA Lino  [facility]  [phone]  Fax: [phone]

## 2020-05-05 NOTE — TELEPHONE ENCOUNTER
Routing refill request to provider for review/approval because:  Rita given x1 and patient did not follow up, please advise  Due for medication follow up    PHQ-9 score:    PHQ 8/20/2019   PHQ-9 Total Score 0   Q9: Thoughts of better off dead/self-harm past 2 weeks Not at all     Swetha SEYMOURN, RN, PHN

## 2020-05-08 ENCOUNTER — PATIENT OUTREACH (OUTPATIENT)
Dept: GERIATRIC MEDICINE | Facility: CLINIC | Age: 85
End: 2020-05-08

## 2020-05-08 NOTE — PROGRESS NOTES
Aracelis was assessed for the same PCA hours this year as she was last. PCA hours will remain the same. Spoke w/ Aracelis's daughter Yoselin and informed her of the above info.    Flora Luis, [facility]  [phone]  Fax: [phone]

## 2020-05-13 ENCOUNTER — PATIENT OUTREACH (OUTPATIENT)
Dept: GERIATRIC MEDICINE | Facility: CLINIC | Age: 85
End: 2020-05-13

## 2020-05-13 NOTE — PROGRESS NOTES
[facility] Care Coordination Contact    Received after visit chart from care coordinator.  Completed following tasks: Mailed copy of care plan to client, Updated services in access and mailed PCA & POC sig pages with SASE.  , Provider Signature - No POC Shared:  Member indicates that they do not want their POC shared with any EW providers.     and [employer]:  Faxed completed PCA assessment to PCA Agency and mailed copies to member.  Faxed MD Communication to PCP.  Emailed referral form for auth to [employer].    Iona Dhillon  Case Management Specialist  [facility]  [phone]

## 2020-05-13 NOTE — LETTER
May 13, 2020    Important [employer] Information    ARACELIS KENNEDY JOE  [address]   [place] [address]  Your Care Plan  Dear Aracelis,  When we spoke recently, I promised to send you a Care Plan. The plan enclosed is a summary of our discussion. It includes the steps we agreed would help you meet your health goals. In addition, I can help you with:  Faflphf-Q-IcxsPH  This program is available to members who need a ride to medical and dental visits. To schedule a ride, call [phone] or [phone] (toll free). TTY/TTD: 711. You can call 8 a.m. to 8 p.m. Seven days a week. Access to a representative may be limited at times.    eÃ“tica   The eÃ“tica program empowers you to improve your health through education and exercise. To learn more, visit makerist, or call [employer] at [phone] (toll free) (TTY:711) from 7 a.m. - 7 p.m. Central Time, Monday-Friday.  Health Care Directive   This form helps you outline your health care wishes. You can request a form from me and I will answer any questions you have before you discuss it with your doctor.   Annual Physical  Take a key step on your path to good health and set up an annual physical at your clinic.  Questions?  Call me at [phone] Monday-Friday between 8am and 5pm.  TTY/TTD: 711. As we discussed, I plan to be in touch with you again in 6 months to follow up via phone.  Sincerely,    MARTA Lino    E-mail: [email or web address]  Phone:[phone]      Cheggin            cc: member records                    Civil Rights Notice  Discrimination is against the law. [employer] does not discriminate on the basis of any of the following:    Race    Color    National Origin    Creed    Alevism    Age    Public Assistance Status    Receipt of Health Care Services    Disability (including physical or mental impairment)    Sex (including sex stereotypes and gender  identity)    Marital Status    Political Beliefs    Medical Condition    Genetic Information    Sexual Orientation    Claims Experience    Medical History    Health Status    Auxiliary Aids and Services:  [employer] provides auxiliary aids and services, like qualified interpreters or information in accessible formats, free of charge and in a timely manner, to ensure an equal opportunity to participate in our health care programs. Contact [employer] Customer Service at tibdit/contact medicaid or call [phone] (toll free); TTY:714 or at [email or web address].    Language Assistance Services:  [employer] provides translated documents and spoken language interpreting, free of charge and in a timely manner, when language assistance services are necessary to ensure limited English speakers have meaningful access to our information and services. Contact [employer] at [phone] (toll free); TTY: 073 or [email or web address].     Civil Rights Complaints  You have the right to file a discrimination complaint if you believe you were treated in a discriminatory way by [employer]. You may contact any of the following four agencies directly to file a discrimination complaint.    U.S. Department of Health and Human Services  Office for Civil Rights (OCR)  You have the right to file a complaint with the OCR, a federal agency, if you believe you have been discriminated against because of any of the following:    Race    Disability    Color    Sex    National Origin    Age      Contact the OCR directly to file a complaint:         Director         U.S. Department of Health and Human Services  Office for Civil Rights         [address] [place] [address], [place] [address]         [phone] (Voice)         [phone] (TDD)         Complaint Portal - https://ocrportal.hhs.gov/ocr/portal/lobby.jsf     [place] Department of Human Rights ([facility])  In [place], you have the right to  file a complaint with the MDHR if you believe you have been discriminated against because of any of the following:      Race    Color    National Origin    Yazidi    Creed    Sex    Sexual Orientation    Marital Status    Public Assistance Status    Disability    Contact the MDHR directly to file a complaint:         [place] Department of Human Rights         [facility], [address] [place] [address]         [phone] (voice)          [phone] (toll free)         711 or [phone] ([place] Relay)         [phone] (Fax)         [email or web address] (Email)     [place] Department of Human Services (DHS)  You have the right to file a complaint with [facility] if you believe you have been discriminated against in our health care programs because of any of the following:    Race    Color    National Origin    Creed    Yazidi    Age    Public Assistance Status    Receipt of Health Care Services    Disability (including physical or mental impairment)    Sex (including sex stereotypes and gender identity)    Marital Status    Political Beliefs    Medical Condition    Genetic Information    Sexual Orientation    Claims Experience    Medical History    Health Status    Complaints must be in writing and filed within 180 days of the date you discovered the alleged discrimination. The complaint must contain your name and address and describe the discrimination you are complaining about. After we get your complaint, we will review it and notify you in writing about whether we have authority to investigate. If we do, we will investigate the complaint.      [facility] will notify you in writing of the investigation s outcome. You have a right to appeal the outcome if you disagree with the decision. To appeal, you must send a written request to have [facility] review the investigation outcome period. Be brief and state why you disagree with the decision. Include additional information you think is important.       If you file a complaint in this way, the people who work for the agency named in the complaint cannot retaliate against you. This means they cannot punish you in any way for filing a complaint. Filing a complaint in this way does not stop you from seeking out other legal or administration actions.     Contact DHS directly to file a discrimination complaint:        Civil Rights Coordinator        [facility]        Equal Opportunity and Access Division        [address] [address]        [place] [address] [phone] (voice) or use your preferred relay service     [employer] Complaint Notice   You have the right to file a complaint with [employer] if you believe you have been discriminated against because of any of the following:       Medical condition    Health status    Receipt of health care services    Claims experience    Medical history    Genetic information    Disability (including mental or physical impairment)    Marital status    Age    Sex (including sex stereotypes and gender identity)    Sexual orientation    National origin    Race    Color    Pentecostalism    Creed    Public assistance status    Political beliefs    You can file a complaint and ask for help in filing a complaint in person or by mail, phone, fax, or email at:     [employer] Civil Rights Coordinator  [facility] [employer] [facility]  [address], [place] [address] [phone] (voice and fax) or TTY:396  Email: [email or web address]    American Indians can continue to use [place] and  Health Services (IHS) clinics. We will not require prior approval or impose any conditions for you to get services at these clinics. For elders age 65 years and older this includes Elderly Waiver (EW) services accessed through the [place]. If a doctor or other provider in a [place] or IHS clinic refers you to a provider in our network, we will not require you to see your primary care provider prior to the  referral.    For accessible formats of this publication or assistance with equal or access to our services, visit [email or web address], or call [phone] (toll free) or use your preferred relay service.

## 2020-05-19 NOTE — PROGRESS NOTES
Rec'd POC and PCA sig pages back signed.  PCA sig page was faxed off to the PCA agency.  Iona Dhillon  Case Management Specialist  [facility]  [phone]

## 2020-06-18 ENCOUNTER — TELEPHONE (OUTPATIENT)
Dept: FAMILY MEDICINE | Facility: CLINIC | Age: 85
End: 2020-06-18

## 2020-06-18 DIAGNOSIS — K59.00 CONSTIPATION, UNSPECIFIED CONSTIPATION TYPE: ICD-10-CM

## 2020-06-18 RX ORDER — SENNOSIDES 8.6 MG
1 TABLET ORAL DAILY
Qty: 90 TABLET | Refills: 4 | Status: SHIPPED | OUTPATIENT
Start: 2020-06-18 | End: 2020-07-16

## 2020-06-18 NOTE — TELEPHONE ENCOUNTER
Reason for Call:  Medication or medication refill:    Do you use a [facility]?  Name of the pharmacy and phone number for the current request:   [facility] [address] [place] [address]     Name of the medication requested:   sennosides (SENOKOT) 8.6 MG tablet     Other request:   Blanca an  came into clinic with Pt on the phone. She is stating that the Pt is wanting a refill of this medication. Pt has only see Pauline Ruiz at this Clinic.    Can we leave a detailed message on this number?   No    Phone number patient can be reached at: Cell number on file:    Telephone Information:   Mobile [phone]       Best Time: Any    Call taken on 6/18/2020 at 11:12 AM by Nelly Mckinney

## 2020-07-16 DIAGNOSIS — K59.00 CONSTIPATION, UNSPECIFIED CONSTIPATION TYPE: ICD-10-CM

## 2020-07-16 RX ORDER — SENNOSIDES 8.6 MG/1
TABLET ORAL
Qty: 90 TABLET | Refills: 0 | Status: SHIPPED | OUTPATIENT
Start: 2020-07-16 | End: 2020-11-25

## 2020-07-21 DIAGNOSIS — I10 HYPERTENSION, UNSPECIFIED TYPE: ICD-10-CM

## 2020-07-21 DIAGNOSIS — M81.0 OSTEOPOROSIS WITHOUT CURRENT PATHOLOGICAL FRACTURE, UNSPECIFIED OSTEOPOROSIS TYPE: ICD-10-CM

## 2020-07-21 DIAGNOSIS — K21.9 GASTROESOPHAGEAL REFLUX DISEASE WITHOUT ESOPHAGITIS: ICD-10-CM

## 2020-07-21 RX ORDER — ASPIRIN 81 MG/1
TABLET, COATED ORAL
Qty: 90 TABLET | Refills: 1 | Status: SHIPPED | OUTPATIENT
Start: 2020-07-21 | End: 2021-01-11

## 2020-07-21 RX ORDER — AMLODIPINE BESYLATE 2.5 MG/1
TABLET ORAL
Qty: 90 TABLET | Refills: 1 | Status: SHIPPED | OUTPATIENT
Start: 2020-07-21 | End: 2021-01-11

## 2020-07-21 RX ORDER — MULTIVITAMIN WITH FOLIC ACID 400 MCG
TABLET ORAL
Qty: 90 TABLET | Refills: 1 | Status: SHIPPED | OUTPATIENT
Start: 2020-07-21 | End: 2021-01-11

## 2020-07-21 NOTE — TELEPHONE ENCOUNTER
Routing refill request to provider for review/approval because:  Dx of osteoporosis on record

## 2020-08-11 ENCOUNTER — OFFICE VISIT (OUTPATIENT)
Dept: FAMILY MEDICINE | Facility: CLINIC | Age: 85
End: 2020-08-11
Payer: COMMERCIAL

## 2020-08-11 DIAGNOSIS — M54.41 ACUTE RIGHT-SIDED LOW BACK PAIN WITH RIGHT-SIDED SCIATICA: Primary | ICD-10-CM

## 2020-08-11 PROCEDURE — 99213 OFFICE O/P EST LOW 20 MIN: CPT | Mod: TEL | Performed by: INTERNAL MEDICINE

## 2020-08-11 RX ORDER — PREDNISONE 20 MG/1
20 TABLET ORAL 2 TIMES DAILY
Qty: 10 TABLET | Refills: 0 | Status: SHIPPED | OUTPATIENT
Start: 2020-08-11 | End: 2020-11-22

## 2020-08-11 NOTE — PROGRESS NOTES
"Aracelis Blakely is a 89 year old female who is being evaluated via a billable telephone visit.      The patient has been notified of following:     \"This telephone visit will be conducted via a call between you and your physician/provider. We have found that certain health care needs can be provided without the need for a physical exam.  This service lets us provide the care you need with a short phone conversation.  If a prescription is necessary we can send it directly to your pharmacy.  If lab work is needed we can place an order for that and you can then stop by our lab to have the test done at a later time.    Telephone visits are billed at different rates depending on your insurance coverage. During this emergency period, for some insurers they may be billed the same as an in-person visit.  Please reach out to your insurance provider with any questions.    If during the course of the call the physician/provider feels a telephone visit is not appropriate, you will not be charged for this service.\"    Patient has given verbal consent for Telephone visit?  Yes    What phone number would you like to be contacted at? Call Magui Vincent  [phone].    (Patient's ph# [phone], if need)    How would you like to obtain your AVS? Mail a copy    Subjective     Aracelis Blakely is a 89 year old female who presents via phone visit today for the following health issues:    HPI    Rt hip and leg pain     Patient presented for evaluation of low back pain.  Mariah  present called Beatrice.  Pain of back pain started 1 month ago into the right leg and down to the foot.  Patient having problems with walking, she is using a cane.  She has been taking Tylenol and Advil 3 times a day alternating.  This helps \"little bit\".  Also is massaging her back allotment as well.  She is maintained on omeprazole.  Denies any stomach ulcers or pain.  Denies any weakness numbness tingling, denies any GI or "  changes.  She lives with her daughter.  Denies any abdominal pain.  Denies any other symptoms.  Denies any falls or injury to her back.      Patient Active Problem List   Diagnosis     Insomnia, unspecified type     Hypertension, unspecified type     Gastroesophageal reflux disease without esophagitis     Osteoporosis without current pathological fracture, unspecified osteoporosis type     Health Care Home     Chronic cough     Constipation, unspecified constipation type     Depression, unspecified depression type     Falls frequently     Poor balance     Varicose veins of both lower extremities, unspecified whether complicated     Past Surgical History:   Procedure Laterality Date     CHOLECYSTECTOMY       RELEASE CARPAL TUNNEL Right 6/13/2016       Social History     Tobacco Use     Smoking status: Never Smoker     Smokeless tobacco: Never Used   Substance Use Topics     Alcohol use: No     Family History   Problem Relation Age of Onset     Unknown/Adopted Mother      Unknown/Adopted Father          Current Outpatient Medications   Medication Sig Dispense Refill     acetaminophen (TYLENOL) 325 MG tablet Take 325-650 mg by mouth       albuterol (2.5 MG/3ML) 0.083% neb solution Inhale 2.5 mg into the lungs       ALPRAZolam (XANAX) 0.25 MG tablet Take 1 tablet (0.25 mg) by mouth 3 times daily as needed for anxiety (take one an hour before flight and then another in [place];  reverse on return flight) 4 tablet 0     amLODIPine (NORVASC) 2.5 MG tablet TAKE 1 TABLET BY MOUTH EVERY DAY 90 tablet 1     Ascorbic Acid (VITAMIN C/BIOFLAVONOIDS/ROSEHP PO) Take by mouth daily       ASPIRIN LOW DOSE 81 MG EC tablet TAKE 1 TABLET BY MOUTH EVERY DAY WITH FOOD 90 tablet 1     azelastine (OPTIVAR) 0.05 % SOLN ophthalmic solution Place 1 drop into both eyes 2 times daily       cetirizine (ZYRTEC) 10 MG tablet Take 10 mg by mouth       cholecalciferol (VITAMIN D3) 1000 UNIT tablet Take by mouth daily       cyanocobalamin 1000 MCG  TABS        guaiFENesin (MUCINEX) 600 MG 12 hr tablet Take 1 tablet (600 mg) by mouth 2 times daily PRN congestion 30 tablet 0     meclizine (ANTIVERT) 12.5 MG tablet Take 1 tablet (12.5 mg) by mouth 3 times daily as needed for dizziness 30 tablet 0     meclizine (ANTIVERT) 25 MG tablet Take 1 tablet (25 mg) by mouth 3 times daily as needed for dizziness 15 tablet 0     Multiple Vitamin (DAILY-NETTA) TABS TAKE 1 TABLET BY MOUTH EVERY DAY 90 tablet 1     Omega-3 Fatty Acids (OMEGA-3 FISH OIL PO)        omeprazole (PRILOSEC) 20 MG DR capsule TAKE ONE CAPSULE BY MOUTH EVERY DAY BEFORE A MEAL 90 capsule 3     order for DME WALKER; with wheels, and brakes, and a seat. 1 Device 0     order for DME Equipment being ordered:  Compression stockings,knee high,mild compression 3 Units 3     order for DME Knee high compression stockings.  Light compression.  16-20mm Hg.  Ok 3 pairs.       OVER-THE-COUNTER 550 mg 3 times daily as needed (Takes 3x daily when senokot isn't available or if senokot doesn't work for her.)       OYSTER SHELL CALCIUM/D 500-200 MG-UNIT tablet TAKE 1 TABLET BY MOUTH TWICE A DAY WITH MEALS 180 tablet 1     predniSONE (DELTASONE) 20 MG tablet Take 1 tablet (20 mg) by mouth 2 times daily 10 tablet 0     sertraline (ZOLOFT) 50 MG tablet TAKE 1 TABLET BY MOUTH EVERY DAY 90 tablet 3     SM SENNA LAXATIVE 8.6 MG tablet TAKE 3 TABLETS BY MOUTH EVERY DAY 90 tablet 0     zolpidem (AMBIEN) 5 MG tablet TAKE 1/2 TABLET BY MOUTH AT BEDTIME AS NEEDED FOR SLEEP (Patient not taking: Reported on 8/11/2020) 15 tablet 0     No Known Allergies  Recent Labs   Lab Test 12/26/19  1732 08/20/19  1558 08/01/18  1056  06/23/16  0209   LDL  --  139*  --   --   --    HDL  --  47*  --   --   --    TRIG  --  136  --   --   --    ALT  --  18 18  --  17   CR 0.62 0.72 0.66   < > 0.61   GFRESTIMATED 75 70 84   < > >90  Non  GFR Calc     GFRESTBLACK 87 81 >90   < > >90  African American GFR Calc     POTASSIUM 3.7 4.3 4.1   <  > 3.2*   TSH  --   --  1.59  --   --     < > = values in this interval not displayed.      BP Readings from Last 3 Encounters:   02/13/20 138/70   12/31/19 (!) 144/85   12/26/19 (!) 150/84    Wt Readings from Last 3 Encounters:   02/13/20 72.4 kg (159 lb 9.6 oz)   12/31/19 71.7 kg (158 lb)   08/20/19 73.9 kg (163 lb)                    Reviewed and updated as needed this visit by Provider  Tobacco  Allergies  Meds  Med Hx  Surg Hx  Soc Hx        Review of Systems   Constitutional, HEENT, cardiovascular, pulmonary, gi and gu systems are negative, except as otherwise noted.       Objective          Vitals:  No vitals were obtained today due to virtual visit.    healthy, alert and no distress  PSYCH: Alert and oriented times 3; coherent speech, normal   rate and volume, able to articulate logical thoughts, able   to abstract reason, no tangential thoughts, no hallucinations   or delusions  Her affect is normal  RESP: No cough, no audible wheezing, able to talk in full sentences  Remainder of exam unable to be completed due to telephone visits    Diagnostic Test Results:  Labs reviewed in Epic        Aracelis was seen today for musculoskeletal problem.    Diagnoses and all orders for this visit:    Acute right-sided low back pain with right-sided sciatica  -     predniSONE (DELTASONE) 20 MG tablet; Take 1 tablet (20 mg) by mouth 2 times daily    Will give a short course of oral prednisone.  She has tried nonsteroidal analgesic with minimal help.  Advised to take Tylenol still alternating 650 mg every 8 hours.  Sedentary position that makes her more comfortable.  Will refer to spine clinic.   stated that she has an appointment next week with orthopedic surgery declined a referral at this point.  Advised if any worsening pain focal weakness numbness or other relevant symptoms to seek immediate medical attention.  Advised to take prednisone with food and water and continue to have stomach protection with the  PPI.  There is no history of falls or trauma to suspect any fracture.  There is no imaging to review of her lumbosacral spine in her records.    No follow-ups on file.     Jean Pyle MD

## 2020-08-19 ENCOUNTER — TRANSFERRED RECORDS (OUTPATIENT)
Dept: HEALTH INFORMATION MANAGEMENT | Facility: CLINIC | Age: 85
End: 2020-08-19

## 2020-08-21 ENCOUNTER — TRANSFERRED RECORDS (OUTPATIENT)
Dept: HEALTH INFORMATION MANAGEMENT | Facility: CLINIC | Age: 85
End: 2020-08-21

## 2020-08-27 PROBLEM — S32.040A CLOSED COMPRESSION FRACTURE OF L4 VERTEBRA (H): Status: ACTIVE | Noted: 2020-08-27

## 2020-09-04 ENCOUNTER — TRANSFERRED RECORDS (OUTPATIENT)
Dept: HEALTH INFORMATION MANAGEMENT | Facility: CLINIC | Age: 85
End: 2020-09-04

## 2020-10-02 ENCOUNTER — TRANSFERRED RECORDS (OUTPATIENT)
Dept: HEALTH INFORMATION MANAGEMENT | Facility: CLINIC | Age: 85
End: 2020-10-02

## 2020-10-16 ENCOUNTER — PATIENT OUTREACH (OUTPATIENT)
Dept: GERIATRIC MEDICINE | Facility: CLINIC | Age: 85
End: 2020-10-16

## 2020-10-16 NOTE — PROGRESS NOTES
[facility] Care Coordination Contact    First Attempt:    Called adult daughter Yoselin to complete six month assessment and left a message requesting a return call.    MARTA Lino  [facility]  [phone]  Fax: [phone]

## 2020-10-22 NOTE — PROGRESS NOTES
[facility] Care Coordination Contact      [facility] Six-Month Telephone Assessment    6 month telephone assessment completed on 10/21/20.    ER visits: No  Hospitalizations: No  TCU stays: No  Significant health status changes: Pain from a fall  Falls/Injuries: No  ADL/IADL changes: No  Changes in services: No    Caregiver Assessment follow up:  Completed with alfonso Wyatt    Goals: See POC in chart for goal progress documentation.      Will see member in 6 months for an annual health risk assessment.   Encouraged member to call CC with any questions or concerns in the meantime.    MARTA Lino  [facility]  [phone]  Fax: [phone]

## 2020-11-22 PROBLEM — S32.040A CLOSED COMPRESSION FRACTURE OF L4 VERTEBRA (H): Chronic | Status: ACTIVE | Noted: 2020-08-27

## 2020-11-22 PROBLEM — I10 HYPERTENSION, UNSPECIFIED TYPE: Chronic | Status: ACTIVE | Noted: 2017-12-12

## 2020-11-22 PROBLEM — M81.0 OSTEOPOROSIS WITHOUT CURRENT PATHOLOGICAL FRACTURE, UNSPECIFIED OSTEOPOROSIS TYPE: Chronic | Status: ACTIVE | Noted: 2017-12-12

## 2020-11-25 ENCOUNTER — OFFICE VISIT (OUTPATIENT)
Dept: FAMILY MEDICINE | Facility: CLINIC | Age: 85
End: 2020-11-25
Payer: COMMERCIAL

## 2020-11-25 ENCOUNTER — PATIENT OUTREACH (OUTPATIENT)
Dept: GERIATRIC MEDICINE | Facility: CLINIC | Age: 85
End: 2020-11-25

## 2020-11-25 VITALS
HEART RATE: 83 BPM | DIASTOLIC BLOOD PRESSURE: 70 MMHG | SYSTOLIC BLOOD PRESSURE: 120 MMHG | RESPIRATION RATE: 14 BRPM | BODY MASS INDEX: 29.88 KG/M2 | WEIGHT: 153 LBS | TEMPERATURE: 96.8 F | OXYGEN SATURATION: 94 %

## 2020-11-25 DIAGNOSIS — K59.00 CONSTIPATION, UNSPECIFIED CONSTIPATION TYPE: ICD-10-CM

## 2020-11-25 DIAGNOSIS — I83.93 VARICOSE VEINS OF BOTH LOWER EXTREMITIES, UNSPECIFIED WHETHER COMPLICATED: ICD-10-CM

## 2020-11-25 DIAGNOSIS — I10 HYPERTENSION, UNSPECIFIED TYPE: ICD-10-CM

## 2020-11-25 DIAGNOSIS — Z00.00 ENCOUNTER FOR ANNUAL WELLNESS EXAM IN MEDICARE PATIENT: Primary | ICD-10-CM

## 2020-11-25 DIAGNOSIS — M81.0 OSTEOPOROSIS WITHOUT CURRENT PATHOLOGICAL FRACTURE, UNSPECIFIED OSTEOPOROSIS TYPE: ICD-10-CM

## 2020-11-25 DIAGNOSIS — S32.040D CLOSED COMPRESSION FRACTURE OF L4 LUMBAR VERTEBRA WITH ROUTINE HEALING, SUBSEQUENT ENCOUNTER: ICD-10-CM

## 2020-11-25 DIAGNOSIS — K21.9 GASTROESOPHAGEAL REFLUX DISEASE WITHOUT ESOPHAGITIS: ICD-10-CM

## 2020-11-25 LAB
BASOPHILS # BLD AUTO: 0 10E9/L (ref 0–0.2)
BASOPHILS NFR BLD AUTO: 0.5 %
DIFFERENTIAL METHOD BLD: NORMAL
EOSINOPHIL # BLD AUTO: 0.2 10E9/L (ref 0–0.7)
EOSINOPHIL NFR BLD AUTO: 4.1 %
ERYTHROCYTE [DISTWIDTH] IN BLOOD BY AUTOMATED COUNT: 11.8 % (ref 10–15)
HCT VFR BLD AUTO: 39.8 % (ref 35–47)
HGB BLD-MCNC: 12.9 G/DL (ref 11.7–15.7)
LYMPHOCYTES # BLD AUTO: 2.1 10E9/L (ref 0.8–5.3)
LYMPHOCYTES NFR BLD AUTO: 35.1 %
MCH RBC QN AUTO: 28.2 PG (ref 26.5–33)
MCHC RBC AUTO-ENTMCNC: 32.4 G/DL (ref 31.5–36.5)
MCV RBC AUTO: 87 FL (ref 78–100)
MONOCYTES # BLD AUTO: 0.6 10E9/L (ref 0–1.3)
MONOCYTES NFR BLD AUTO: 10.6 %
NEUTROPHILS # BLD AUTO: 2.9 10E9/L (ref 1.6–8.3)
NEUTROPHILS NFR BLD AUTO: 49.7 %
PLATELET # BLD AUTO: 213 10E9/L (ref 150–450)
RBC # BLD AUTO: 4.57 10E12/L (ref 3.8–5.2)
WBC # BLD AUTO: 5.9 10E9/L (ref 4–11)

## 2020-11-25 PROCEDURE — 85025 COMPLETE CBC W/AUTO DIFF WBC: CPT | Performed by: INTERNAL MEDICINE

## 2020-11-25 PROCEDURE — 99397 PER PM REEVAL EST PAT 65+ YR: CPT | Performed by: INTERNAL MEDICINE

## 2020-11-25 PROCEDURE — 36415 COLL VENOUS BLD VENIPUNCTURE: CPT | Performed by: INTERNAL MEDICINE

## 2020-11-25 PROCEDURE — 80053 COMPREHEN METABOLIC PANEL: CPT | Performed by: INTERNAL MEDICINE

## 2020-11-25 RX ORDER — SENNOSIDES A AND B 8.6 MG/1
TABLET, FILM COATED ORAL
Qty: 90 TABLET | Refills: 11 | Status: SHIPPED | OUTPATIENT
Start: 2020-11-25 | End: 2021-05-27

## 2020-11-25 ASSESSMENT — PATIENT HEALTH QUESTIONNAIRE - PHQ9
SUM OF ALL RESPONSES TO PHQ QUESTIONS 1-9: 0
SUM OF ALL RESPONSES TO PHQ QUESTIONS 1-9: 0
10. IF YOU CHECKED OFF ANY PROBLEMS, HOW DIFFICULT HAVE THESE PROBLEMS MADE IT FOR YOU TO DO YOUR WORK, TAKE CARE OF THINGS AT HOME, OR GET ALONG WITH OTHER PEOPLE: NOT DIFFICULT AT ALL

## 2020-11-25 ASSESSMENT — ACTIVITIES OF DAILY LIVING (ADL): CURRENT_FUNCTION: NO ASSISTANCE NEEDED

## 2020-11-25 NOTE — LETTER
November 26, 2020      Aracelis Blakely  [address]   [place] [address]        Dear Ms.Rabeti Blakely,    We are writing to inform you of your test results.              Your lab results are normal,including the liver,kidney,glucose,bone marrow,and the potassium level.      Keep taking the same medications.     Results for orders placed or performed in visit on 11/25/20   Comprehensive metabolic panel (BMP + Alb, Alk Phos, ALT, AST, Total. Bili, TP)     Status: Abnormal   Result Value Ref Range    Sodium 134 133 - 144 mmol/L    Potassium 4.4 3.4 - 5.3 mmol/L    Chloride 102 94 - 109 mmol/L    Carbon Dioxide 29 20 - 32 mmol/L    Anion Gap 3 3 - 14 mmol/L    Glucose 99 70 - 99 mg/dL    Urea Nitrogen 20 7 - 30 mg/dL    Creatinine 0.84 0.52 - 1.04 mg/dL    GFR Estimate 58 (L) >60 mL/min/[1.73_m2]    GFR Estimate If Black 67 >60 mL/min/[1.73_m2]    Calcium 9.1 8.5 - 10.1 mg/dL    Bilirubin Total 0.4 0.2 - 1.3 mg/dL    Albumin 3.7 3.4 - 5.0 g/dL    Protein Total 7.2 6.8 - 8.8 g/dL    Alkaline Phosphatase 81 40 - 150 U/L    ALT 16 0 - 50 U/L    AST 18 0 - 45 U/L   CBC with platelets and differential     Status: None   Result Value Ref Range    WBC 5.9 4.0 - 11.0 10e9/L    RBC Count 4.57 3.8 - 5.2 10e12/L    Hemoglobin 12.9 11.7 - 15.7 g/dL    Hematocrit 39.8 35.0 - 47.0 %    MCV 87 78 - 100 fl    MCH 28.2 26.5 - 33.0 pg    MCHC 32.4 31.5 - 36.5 g/dL    RDW 11.8 10.0 - 15.0 %    Platelet Count 213 150 - 450 10e9/L    % Neutrophils 49.7 %    % Lymphocytes 35.1 %    % Monocytes 10.6 %    % Eosinophils 4.1 %    % Basophils 0.5 %    Absolute Neutrophil 2.9 1.6 - 8.3 10e9/L    Absolute Lymphocytes 2.1 0.8 - 5.3 10e9/L    Absolute Monocytes 0.6 0.0 - 1.3 10e9/L    Absolute Eosinophils 0.2 0.0 - 0.7 10e9/L    Absolute Basophils 0.0 0.0 - 0.2 10e9/L    Diff Method Automated Method          If you have any questions or concerns, please call the clinic at the number listed above.       Sincerely,        Truman MCGOWAN  MD Louie

## 2020-11-25 NOTE — LETTER
November 26, 2020      Aracelis Blakely  [address]   [place] [address]        Dear Ms.Rabeti Blakely,    We are writing to inform you of your test results.      Your lab results are normal,including the liver,kidney,glucose,bone marrow,and the potassium level.      Keep taking the same medications.     Resulted Orders   Comprehensive metabolic panel (BMP + Alb, Alk Phos, ALT, AST, Total. Bili, TP)   Result Value Ref Range    Sodium 134 133 - 144 mmol/L    Potassium 4.4 3.4 - 5.3 mmol/L    Chloride 102 94 - 109 mmol/L    Carbon Dioxide 29 20 - 32 mmol/L    Anion Gap 3 3 - 14 mmol/L    Glucose 99 70 - 99 mg/dL    Urea Nitrogen 20 7 - 30 mg/dL    Creatinine 0.84 0.52 - 1.04 mg/dL    GFR Estimate 58 (L) >60 mL/min/[1.73_m2]      Comment:      Non  GFR Calc  Starting 12/18/2018, serum creatinine based estimated GFR (eGFR) will be   calculated using the Chronic Kidney Disease Epidemiology Collaboration   (CKD-EPI) equation.      GFR Estimate If Black 67 >60 mL/min/[1.73_m2]      Comment:       GFR Calc  Starting 12/18/2018, serum creatinine based estimated GFR (eGFR) will be   calculated using the Chronic Kidney Disease Epidemiology Collaboration   (CKD-EPI) equation.      Calcium 9.1 8.5 - 10.1 mg/dL    Bilirubin Total 0.4 0.2 - 1.3 mg/dL    Albumin 3.7 3.4 - 5.0 g/dL    Protein Total 7.2 6.8 - 8.8 g/dL    Alkaline Phosphatase 81 40 - 150 U/L    ALT 16 0 - 50 U/L    AST 18 0 - 45 U/L   CBC with platelets and differential   Result Value Ref Range    WBC 5.9 4.0 - 11.0 10e9/L    RBC Count 4.57 3.8 - 5.2 10e12/L    Hemoglobin 12.9 11.7 - 15.7 g/dL    Hematocrit 39.8 35.0 - 47.0 %    MCV 87 78 - 100 fl    MCH 28.2 26.5 - 33.0 pg    MCHC 32.4 31.5 - 36.5 g/dL    RDW 11.8 10.0 - 15.0 %    Platelet Count 213 150 - 450 10e9/L    % Neutrophils 49.7 %    % Lymphocytes 35.1 %    % Monocytes 10.6 %    % Eosinophils 4.1 %    % Basophils 0.5 %    Absolute Neutrophil 2.9 1.6 - 8.3  10e9/L    Absolute Lymphocytes 2.1 0.8 - 5.3 10e9/L    Absolute Monocytes 0.6 0.0 - 1.3 10e9/L    Absolute Eosinophils 0.2 0.0 - 0.7 10e9/L    Absolute Basophils 0.0 0.0 - 0.2 10e9/L    Diff Method Automated Method        If you have any questions or concerns, please call the clinic at the number listed above.       Sincerely,        Truman Palma MD

## 2020-11-25 NOTE — PROGRESS NOTES
Notified daughter of [facility] clinic closure.    Daughter plans to transfer Aracelis's care to the [facility] where PCP is transferring to.    MARTA Lino  [facility]  [phone]  Fax: [phone]

## 2020-11-25 NOTE — PROGRESS NOTES
"SUBJECTIVE:   Aracelis Blakely is a 89 year old female who presents for Preventive Visit.      Patient has been advised of split billing requirements and indicates understanding: Yes   Are you in the first 12 months of your Medicare coverage?  No    Healthy Habits:     In general, how would you rate your overall health?  Excellent    Frequency of exercise:  6-7 days/week    Duration of exercise:  Less than 15 minutes    Do you usually eat at least 4 servings of fruit and vegetables a day, include whole grains    & fiber and avoid regularly eating high fat or \"junk\" foods?  Yes    Taking medications regularly:  Yes    Medication side effects:  None    Ability to successfully perform activities of daily living:  No assistance needed    Home Safety:  No safety concerns identified    Hearing Impairment:  Feel that people are mumbling or not speaking clearly and no hearing concerns    In the past 6 months, have you been bothered by leaking of urine?  No    In general, how would you rate your overall mental or emotional health?  Good      PHQ-2 Total Score: 3    Additional concerns today:  No    Do you feel safe in your environment? Yes          Fall risk  Fallen 2 or more times in the past year?: No  Any fall with injury in the past year?: No    Cognitive Screening   1) Repeat 3 items (Leader, Season, Table)      3) 3 item recall: Recalls 3 objects  Results: 3 items recalled: COGNITIVE IMPAIRMENT LESS LIKELY    Mini-CogTM Copyright BRIAN Kc. Licensed by the author for use in [facility]; reprinted with permission ([email or web address].[email or web address]). All rights reserved.      Do you have sleep apnea, excessive snoring or daytime drowsiness?: no    Reviewed and updated as needed this visit by clinical staff  Tobacco  Allergies  Meds   Med Hx  Surg Hx  Fam Hx  Soc Hx        Reviewed and updated as needed this visit by Provider                Social History     Tobacco Use     Smoking status: Never Smoker     Smokeless " tobacco: Never Used   Substance Use Topics     Alcohol use: No     If you drink alcohol do you typically have >3 drinks per day or >7 drinks per week? No    Alcohol Use 11/25/2020   Prescreen: >3 drinks/day or >7 drinks/week? No   Prescreen: >3 drinks/day or >7 drinks/week? -   No flowsheet data found.        This patient is here with her daughter, who interprets.                   There is a minor language barrier even with the daughter.               This patient is 89 years old, and still lives independently.  The daughter states that her mother even has a better memory than she does.            This patient had a recent L4 compression fracture, with evaluation and then treatment at [facility].  She did wear a lumbar corset for a while.  She had some physical therapy.  They also noted lumbar spinal stenosis.  She had some sort of injection therapy as well.  She states her back is feeling much better.  She is using Voltaren gel twice per day on the low back area and she would like a refill of that medication.                We reviewed the rest of her medications.  She is taking multiple supplements.  She still takes amlodipine 2.5 mg/day and her blood pressure is under control.  She still takes omeprazole daily.  She states she needs this for control of reflux symptoms.              She wants a new Rx for knee-high compression stockings.                She has chronic constipation and takes 3 senna tablets per day.                                                                                                                                                                                                                                                                                                                                                                             Current providers sharing in care for this patient include:   Patient Care Team:  Truman Palma MD as PCP - General (Internal  Medicine)  Flora Luis LSW as Lead Care Coordinator (Primary Care - CC)  Jean Pyle MD as Assigned PCP    The following health maintenance items are reviewed in Epic and correct as of today:  Health Maintenance   Topic Date Due     ADVANCE CARE PLANNING  03/21/1922     DEPRESSION ACTION PLAN  03/21/1922     ZOSTER IMMUNIZATION (2 of 3) 10/23/2013     PHQ-9  02/20/2020     FALL RISK ASSESSMENT  08/11/2021     MEDICARE ANNUAL WELLNESS VISIT  11/25/2021     DTAP/TDAP/TD IMMUNIZATION (2 - Td) 01/08/2024     INFLUENZA VACCINE  Completed     Pneumococcal Vaccine: 65+ Years  Completed     Pneumococcal Vaccine: Pediatrics (0 to 5 Years) and At-Risk Patients (6 to 64 Years)  Aged Out     IPV IMMUNIZATION  Aged Out     MENINGITIS IMMUNIZATION  Aged Out     HEPATITIS B IMMUNIZATION  Aged Out     Patient Active Problem List    Diagnosis Date Noted     Closed compression fracture of L4 vertebra (H) 08/27/2020     Priority: High     See [facility] notes       Osteoporosis without current pathological fracture, unspecified osteoporosis type 12/12/2017     Priority: High     Rx alendronate; since ? ; since 2016 or 2017?                CXR 12/17 shows Mild  kyphosis lower thoracic spine is noted. No vertebral body compression  fracture is evident.          STOP alendronate in 8/19       Falls frequently 04/02/2019     Priority: Medium     Poor balance 04/02/2019     Priority: Medium     Varicose veins of both lower extremities, unspecified whether complicated 04/02/2019     Priority: Medium     Chronic cough 07/31/2018     Priority: Medium     CXR neg in 12/17       Constipation, unspecified constipation type 07/31/2018     Priority: Medium     Depression, unspecified depression type 07/31/2018     Priority: Medium     controlled with sertraline       Insomnia, unspecified type 12/12/2017     Priority: Medium     RX monitoring program ([place]) reviewed:  reviewed 5/14/19- no concerns     [place]MP profile:   "[email or web address]  Chronic zolpidem use; BIW       Hypertension, unspecified type 2017     Priority: Medium     Gastroesophageal reflux disease without esophagitis 2017     Priority: Medium     Long term use of PPI       Health Care Home 2018     Priority: Low     [facility] Care Coordinator  MARTA Lino  [phone]    [facility] CARE PLAN SUMMARY    Member Name:  Aracelis Blakely  Address:  [address] [place] [address] Phone: [phone] (home)    :  3/21/1922 Date of Assessment: 20   Health Plan:  [employer]  Health Plan Number: [id number] Medical Assistance Number: [id number]  Financial Worker: Team [id number]   Case #:  [id number]    [facility] Care Coordinator:  MARTA Lino CC Phone:  [phone]  CC Fax:  [phone]   [facility] Enrollment Date: 2018 Case Mix: I  Rate Cell: A  Waiver Type: None   Primary Emergency Contact:   Yoselin Gonzalez  Home Phone: [phone]  Mobile Phone: [phone]  Relation: Daughter  : Helpful    Secondary Emergency Contact:   Corbin Islas  [place] Phone: [phone]  Relation: Son In-Law (Yoselin's Spouse) Language:  Farsi   :  Yes  : Blanca Page   [phone]     Health Care Agent/POA:   Advanced Directives/Living Will:     Primary Care Clinic[facility]Fax[facility] [phone], (f) [phone] Primary Dx:  Osteoporosis without current pathological fracture, unspecified osteoporosis type (M81.0)  Secondary Dx: Hypertension, unspecified type (I10)   Primary Physician:  Truman Palma   Height:  5' 2\"  Weight:  159 lbs   Specialty Physician:    Audiologist:  Has hearing aides that she chooses not to wear, because they are uncomfortable.   Eye Care Provider: [facility]   [facility]  Dr. Vadim Seo MD, MMM, FACS OPHTHALMOLOGIST  Phone: [phone]  Email: " [email or web address]  Address: [address] Dental Care Provider:  [facility]  [facility]: [facility] [phone]   Other:        [facility] CURRENT SERVICES SUMMARY  Equipment owned/DME history: Regular Cane, 4WW, Shower Chair with Back, Removable half bed rails, walker, detachable shower head, medical phone alert, oxygen, nebulizer    SERVICE TYPE/PROVIDER NAME/PHONE AUTH DATE FREQUENCY Units OR $ Amt DESCRIPTION   Medical Transportation: [facility] [phone]   6/1/20-5/31/21 As Needed Covered by [facility]    PCA: [facility] [phone]   6/1/20-5/31/21  Previous Date: 7/1/19-6/30/20 11 Hours Per Day     See POC    PCA Supervision: [facility]                                [phone]      6/1/20-5/31/21  Previous Date: 7/1/19-6/30/20 2 Hours E/O Month See POC    Supplies: [facility] [phone]  Pull Ups, Size Medium 6/1/20-5/31/21 4-5 Per Day Delivered Monthly See POC                   Past Surgical History:   Procedure Laterality Date     CHOLECYSTECTOMY       RELEASE CARPAL TUNNEL Right 6/13/2016     Social History     Socioeconomic History     Marital status:      Spouse name: Not on file     Number of children: 7     Years of education: 1st Grade     Highest education level: Not on file   Occupational History     Occupation: Homemaker   Social Needs     Financial resource strain: Not on file     Food insecurity     Worry: Not on file     Inability: Not on file     Transportation needs     Medical: Not on file     Non-medical: Not on file   Tobacco Use     Smoking status: Never Smoker     Smokeless tobacco: Never Used   Substance and Sexual Activity     Alcohol use: No     Drug use: No     Sexual activity: Never   Lifestyle     Physical activity     Days per week: Not on file     Minutes per session: Not on file     Stress: Not on file   Relationships     Social connections     Talks on phone: Not on file     Gets together: Not on file      Attends Mandaen service: Not on file     Active member of club or organization: Not on file     Attends meetings of clubs or organizations: Not on file     Relationship status: Not on file     Intimate partner violence     Fear of current or ex partner: Not on file     Emotionally abused: Not on file     Physically abused: Not on file     Forced sexual activity: Not on file   Other Topics Concern     Parent/sibling w/ CABG, MI or angioplasty before 65F 55M? Not Asked   Social History Narrative    7/26/18: Was [place]. Grew up with three sisters and three brothers. Only has one living brother who resides [place] [facility]. Went to school through the first grade. Unable to read or write. Can read/write some words in Farsi. Has never worked. Had seven children. One daughter was killed in an accident. Three grandchildren. Four great grandchildren. No great-great grandchildren. Was  for forty years.  was 26 years older than Aracelis and passed away when she was 48. Added by MARTA Montoya. 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                                                                         3 daughters in the [place].                   Immunization History   Administered Date(s) Administered     Influenza (High Dose) 3 valent vaccine 10/15/2017, 09/14/2018, 10/20/2019, 10/17/2020     Influenza (IIV3) PF 09/19/2012, 09/27/2013, 10/20/2014, 10/29/2015, 11/18/2016     Pneumo Conj 13-V (2010&after) 11/13/2015     Pneumococcal 23 valent 10/15/2008     Pneumococcal, Unspecified 10/13/2010     TDAP Vaccine (Adacel) 01/08/2014     Zoster vaccine, live 12/07/2009, 08/28/2013       BP Readings from Last 3 Encounters:   11/25/20 120/70   02/13/20 138/70   12/31/19 (!) 144/85    Wt Readings from Last 3 Encounters:   11/25/20 69.4 kg (153 lb)   02/13/20 72.4 kg (159 lb 9.6 oz)   12/31/19 71.7 kg (158 lb)                  Current Outpatient Medications   Medication Sig Dispense Refill      acetaminophen (TYLENOL) 325 MG tablet Take 325-650 mg by mouth       albuterol (2.5 MG/3ML) 0.083% neb solution Inhale 2.5 mg into the lungs       amLODIPine (NORVASC) 2.5 MG tablet TAKE 1 TABLET BY MOUTH EVERY DAY 90 tablet 1     Ascorbic Acid (VITAMIN C/BIOFLAVONOIDS/ROSEHP PO) Take by mouth daily       ASPIRIN LOW DOSE 81 MG EC tablet TAKE 1 TABLET BY MOUTH EVERY DAY WITH FOOD 90 tablet 1     azelastine (OPTIVAR) 0.05 % SOLN ophthalmic solution Place 1 drop into both eyes 2 times daily       cholecalciferol (VITAMIN D3) 1000 UNIT tablet Take by mouth daily       cyanocobalamin 1000 MCG TABS        diclofenac (VOLTAREN) 1 % topical gel Apply 4 g topically 2 times daily 100 g 4     Multiple Vitamin (DAILY-NETTA) TABS TAKE 1 TABLET BY MOUTH EVERY DAY 90 tablet 1     Omega-3 Fatty Acids (OMEGA-3 FISH OIL PO)        omeprazole (PRILOSEC) 20 MG DR capsule TAKE ONE CAPSULE BY MOUTH EVERY DAY BEFORE A MEAL 90 capsule 3     order for DME WALKER; with wheels, and brakes, and a seat. 1 Device 0     order for DME Equipment being ordered:  Compression stockings,knee high,mild compression 3 Units 3     order for DME Knee high compression stockings.  Light compression.  16-20mm Hg.  Ok 3 pairs.       OVER-THE-COUNTER 550 mg 3 times daily as needed (Takes 3x daily when senokot isn't available or if senokot doesn't work for her.)       OYSTER SHELL CALCIUM/D 500-200 MG-UNIT tablet TAKE 1 TABLET BY MOUTH TWICE A DAY WITH MEALS 180 tablet 1     senna (SM SENNA LAXATIVE) 8.6 MG tablet TAKE 3 TABLETS BY MOUTH EVERY DAY 90 tablet 11     sertraline (ZOLOFT) 50 MG tablet TAKE 1 TABLET BY MOUTH EVERY DAY 90 tablet 3     zolpidem (AMBIEN) 5 MG tablet TAKE 1/2 TABLET BY MOUTH AT BEDTIME AS NEEDED FOR SLEEP 15 tablet 0     No Known Allergies      Review of Systems see above plus  CONSTITUTIONAL:POSITIVE  for weight loss of a few pounds.  Her appetite was decreased due to pain, at the time of her compression fracture and NEGATIVE  for chills  and fever   RESP:NEGATIVE for significant cough or SOB  CV: NEGATIVE for chest pain/chest pressure and palpitations  GI: NEGATIVE for hematochezia and melena  PSYCHIATRIC: NEGATIVE for changes in mood or affect    OBJECTIVE:   /70   Pulse 83   Temp 96.8  F (36  C) (Tympanic)   Resp 14   Wt 69.4 kg (153 lb)   SpO2 94%   BMI 29.88 kg/m   Estimated body mass index is 29.88 kg/m  as calculated from the following:    Height as of 12/26/19: 1.524 m (5').    Weight as of this encounter: 69.4 kg (153 lb).  Physical Exam  GENERAL APPEARANCE: healthy, alert and no distress  RESP: lungs clear to auscultation - no rales, rhonchi or wheezes  CV: regular rates and rhythm, normal S1 S2, no S3 or S4, no murmur, click or rub and pitting B/L LE edema to 1+ or less, with some varicose veins  PSYCH: mentation appears normal and affect normal/bright    Diagnostic Test Results:  Pending    ASSESSMENT / PLAN:   Aracelis was seen today for physical.    Diagnoses and all orders for this visit:    Encounter for annual wellness exam in Medicare patient    Osteoporosis without current pathological fracture, unspecified osteoporosis type  -     Comprehensive metabolic panel (BMP + Alb, Alk Phos, ALT, AST, Total. Bili, TP)    Hypertension, unspecified type  -     Comprehensive metabolic panel (BMP + Alb, Alk Phos, ALT, AST, Total. Bili, TP)    Gastroesophageal reflux disease without esophagitis  -     CBC with platelets and differential    Constipation, unspecified constipation type  -     senna (SM SENNA LAXATIVE) 8.6 MG tablet; TAKE 3 TABLETS BY MOUTH EVERY DAY    Varicose veins of both lower extremities, unspecified whether complicated  -     Miscellaneous Order for DME - ONLY FOR DME    Closed compression fracture of L4 lumbar vertebra with routine healing, subsequent encounter  -     diclofenac (VOLTAREN) 1 % topical gel; Apply 4 g topically 2 times daily          Summary and implications:  We reviewed multiple issues.            We reviewed all of the issues on the diagnoses list.                         Basic lab work is ordered.           Continue current medications.   Patient Instructions   I will let you know your lab results.     Have a good, safe winter.          No falls!                      Return in about 6 months (around 5/25/2021) for follow up of several issues.      Patient has been advised of split billing requirements and indicates understanding: Yes  COUNSELING:  Reviewed preventive health counseling, as reflected in patient instructions  Special attention given to:       Regular exercise       Healthy diet/nutrition    Estimated body mass index is 29.88 kg/m  as calculated from the following:    Height as of 12/26/19: 1.524 m (5').    Weight as of this encounter: 69.4 kg (153 lb).        She reports that she has never smoked. She has never used smokeless tobacco.      Appropriate preventive services were discussed with this patient, including applicable screening as appropriate for cardiovascular disease, diabetes, osteopenia/osteoporosis, and glaucoma.  As appropriate for age/gender, discussed screening for colorectal cancer, prostate cancer, breast cancer, and cervical cancer. Checklist reviewing preventive services available has been given to the patient.    Reviewed patients plan of care and provided an AVS. The Basic Care Plan (routine screening as documented in Health Maintenance) for Aracelis meets the Care Plan requirement. This Care Plan has been established and reviewed with the Patient and her daughter, who interprets.         Counseling Resources:  ATP IV Guidelines  Pooled Cohorts Equation Calculator  Breast Cancer Risk Calculator  Breast Cancer: Medication to Reduce Risk  FRAX Risk Assessment  ICSI Preventive Guidelines  Dietary Guidelines for Americans, 2010  SeamBLiSS's MyPlate  ASA Prophylaxis  Lung CA Screening    Truman Palma MD  [facility]  Risks:  Answers for HPI/ROS submitted by the patient on 11/25/2020   Annual Exam:  If you checked off any problems, how difficult have these problems made it for you to do your work, take care of things at home, or get along with other people?: Not difficult at all  PHQ9 TOTAL SCORE: 0     Results for orders placed or performed in visit on 11/25/20   Comprehensive metabolic panel (BMP + Alb, Alk Phos, ALT, AST, Total. Bili, TP)     Status: Abnormal   Result Value Ref Range    Sodium 134 133 - 144 mmol/L    Potassium 4.4 3.4 - 5.3 mmol/L    Chloride 102 94 - 109 mmol/L    Carbon Dioxide 29 20 - 32 mmol/L    Anion Gap 3 3 - 14 mmol/L    Glucose 99 70 - 99 mg/dL    Urea Nitrogen 20 7 - 30 mg/dL    Creatinine 0.84 0.52 - 1.04 mg/dL    GFR Estimate 58 (L) >60 mL/min/[1.73_m2]    GFR Estimate If Black 67 >60 mL/min/[1.73_m2]    Calcium 9.1 8.5 - 10.1 mg/dL    Bilirubin Total 0.4 0.2 - 1.3 mg/dL    Albumin 3.7 3.4 - 5.0 g/dL    Protein Total 7.2 6.8 - 8.8 g/dL    Alkaline Phosphatase 81 40 - 150 U/L    ALT 16 0 - 50 U/L    AST 18 0 - 45 U/L   CBC with platelets and differential     Status: None   Result Value Ref Range    WBC 5.9 4.0 - 11.0 10e9/L    RBC Count 4.57 3.8 - 5.2 10e12/L    Hemoglobin 12.9 11.7 - 15.7 g/dL    Hematocrit 39.8 35.0 - 47.0 %    MCV 87 78 - 100 fl    MCH 28.2 26.5 - 33.0 pg    MCHC 32.4 31.5 - 36.5 g/dL    RDW 11.8 10.0 - 15.0 %    Platelet Count 213 150 - 450 10e9/L    % Neutrophils 49.7 %    % Lymphocytes 35.1 %    % Monocytes 10.6 %    % Eosinophils 4.1 %    % Basophils 0.5 %    Absolute Neutrophil 2.9 1.6 - 8.3 10e9/L    Absolute Lymphocytes 2.1 0.8 - 5.3 10e9/L    Absolute Monocytes 0.6 0.0 - 1.3 10e9/L    Absolute Eosinophils 0.2 0.0 - 0.7 10e9/L    Absolute Basophils 0.0 0.0 - 0.2 10e9/L    Diff Method Automated Method      Letter sent.        Your lab results are normal,including the liver,kidney,glucose,bone marrow,and the potassium level.      Keep taking the same medications.

## 2020-11-25 NOTE — PATIENT INSTRUCTIONS
I will let you know your lab results.     Have a good, safe winter.          No falls!

## 2020-11-26 LAB
ALBUMIN SERPL-MCNC: 3.7 G/DL (ref 3.4–5)
ALP SERPL-CCNC: 81 U/L (ref 40–150)
ALT SERPL W P-5'-P-CCNC: 16 U/L (ref 0–50)
ANION GAP SERPL CALCULATED.3IONS-SCNC: 3 MMOL/L (ref 3–14)
AST SERPL W P-5'-P-CCNC: 18 U/L (ref 0–45)
BILIRUB SERPL-MCNC: 0.4 MG/DL (ref 0.2–1.3)
BUN SERPL-MCNC: 20 MG/DL (ref 7–30)
CALCIUM SERPL-MCNC: 9.1 MG/DL (ref 8.5–10.1)
CHLORIDE SERPL-SCNC: 102 MMOL/L (ref 94–109)
CO2 SERPL-SCNC: 29 MMOL/L (ref 20–32)
CREAT SERPL-MCNC: 0.84 MG/DL (ref 0.52–1.04)
GFR SERPL CREATININE-BSD FRML MDRD: 58 ML/MIN/{1.73_M2}
GLUCOSE SERPL-MCNC: 99 MG/DL (ref 70–99)
POTASSIUM SERPL-SCNC: 4.4 MMOL/L (ref 3.4–5.3)
PROT SERPL-MCNC: 7.2 G/DL (ref 6.8–8.8)
SODIUM SERPL-SCNC: 134 MMOL/L (ref 133–144)

## 2020-11-26 ASSESSMENT — PATIENT HEALTH QUESTIONNAIRE - PHQ9: SUM OF ALL RESPONSES TO PHQ QUESTIONS 1-9: 0

## 2021-01-11 DIAGNOSIS — I10 HYPERTENSION, UNSPECIFIED TYPE: ICD-10-CM

## 2021-01-11 DIAGNOSIS — M81.0 OSTEOPOROSIS WITHOUT CURRENT PATHOLOGICAL FRACTURE, UNSPECIFIED OSTEOPOROSIS TYPE: ICD-10-CM

## 2021-01-11 RX ORDER — AMLODIPINE BESYLATE 2.5 MG/1
TABLET ORAL
Qty: 90 TABLET | Refills: 1 | Status: SHIPPED | OUTPATIENT
Start: 2021-01-11 | End: 2021-06-24

## 2021-01-11 RX ORDER — MULTIVITAMIN WITH FOLIC ACID 400 MCG
TABLET ORAL
Qty: 90 TABLET | Refills: 1 | Status: SHIPPED | OUTPATIENT
Start: 2021-01-11 | End: 2021-06-24

## 2021-01-11 RX ORDER — ASPIRIN 81 MG/1
TABLET, COATED ORAL
Qty: 90 TABLET | Refills: 1 | Status: SHIPPED | OUTPATIENT
Start: 2021-01-11 | End: 2021-06-24

## 2021-01-28 NOTE — TELEPHONE ENCOUNTER
CC:  Luz Tyler is here today for Rectal Bleeding   patient here today with daughter noting blood in her underwear the past few days. She was just discharged from the hospital on  1/5/21 but no showed for TCM. Patient denies any pain or bleeding  Medications: medications verified and updated  Refills needed today?  NO  denies Latex allergy or sensitivity  Patient would like communication of their results via:    Cell Phone:   Telephone Information:   Mobile [phone]   Mobile [phone]     Okay to leave a message containing results? Yes  Tobacco history: verified  Health Maintenance   Topic Date Due   • DTaP/Tdap/Td Vaccine (1 - Tdap) 01/02/1951   • Shingles Vaccine (1 of 2) 01/02/1982   • Medicare Wellness Visit  08/24/2019   • Influenza Vaccine (1) 09/01/2020   • Depression Screening  01/04/2022   • Pneumococcal Vaccine 65+  Completed   • Meningococcal Vaccine  Aged Out   • HPV Vaccine  Aged Out           Health Maintenance Due   Topic Date Due   • DTaP/Tdap/Td Vaccine (1 - Tdap) 01/02/1951   • Shingles Vaccine (1 of 2) 01/02/1982   • Medicare Wellness Visit  08/24/2019   • Influenza Vaccine (1) 09/01/2020       Unaddressed Risk Adjusted HCC Categories and Diagnoses  HCC 28 - Cirrhosis of Liver   Unaddressed Dx:Other Cirrhosis Of Liver (Cms/Hcc)  HCC 40 - Rheumatoid Arthritis and Inflammatory Arthropathies   Unaddressed Dx:Polymyositis (Cms/Hcc)          Pre-charting complete. Care gaps identified will be addressed at the time of visit.     Prescription approved per [facility] Refill Protocol.

## 2021-02-15 ENCOUNTER — MEDICAL CORRESPONDENCE (OUTPATIENT)
Dept: HEALTH INFORMATION MANAGEMENT | Facility: CLINIC | Age: 86
End: 2021-02-15

## 2021-02-16 ENCOUNTER — IMMUNIZATION (OUTPATIENT)
Dept: NURSING | Facility: CLINIC | Age: 86
End: 2021-02-16
Payer: COMMERCIAL

## 2021-02-16 PROCEDURE — 91300 PR COVID VAC PFIZER DIL RECON 30 MCG/0.3 ML IM: CPT

## 2021-02-16 PROCEDURE — 0001A PR COVID VAC PFIZER DIL RECON 30 MCG/0.3 ML IM: CPT

## 2021-03-04 ENCOUNTER — PATIENT OUTREACH (OUTPATIENT)
Dept: GERIATRIC MEDICINE | Facility: CLINIC | Age: 86
End: 2021-03-04

## 2021-03-04 NOTE — PROGRESS NOTES
[facility] Care Coordination Contact    Member changed health plan products from [employer]  to [employer] effective 3/1/2021.   Iona Dhillon  Case Management Specialist  [facility]  [phone]

## 2021-03-04 NOTE — LETTER
March 4, 2021    ARACELIS DIAZ  [address]   [place] [address]      Dear Aracelis    Welcome to [facility] ([facility]) health program. My name is MARTA Lino. I am your [facility] care coordinator.     I will call you soon to see how you are doing and determine what needs you may have. My job is to help connect you to services, complete an assessment, and develop a care plan with you. There is no charge to you for the care coordination and assessment services. Our goal is to keep you as healthy and independent as possible.     [facility] combines the benefits you may already receive from Medical Assistance, Medicare and the Prescription Drug Coverage Program.    Soon you will receive a new [facility] member identification (ID) card from [facility]. When you receive it, please use this card where you get your health services.]    If you have questions, please call me at [phone]. If you reach my voice mail, leave a message and your phone number. If you are hearing impaired, please call the [place] Relay at 556 or [phone] (mgukey-fs-istxoa relay service).    Sincerely,    MARTA Lino    E-mail: [email or web address]  Phone:[phone]      [facility]") is a health plan that contracts with both Medicare and the [place] Medical Assistance (Medicaid) program to provide benefits of both programs to enrollees. Enrollment in [facility] depends on contract renewal.    [facility] [id number] DHS Approved ([id number])  [id number] (11/18)

## 2021-03-09 ENCOUNTER — MEDICAL CORRESPONDENCE (OUTPATIENT)
Dept: HEALTH INFORMATION MANAGEMENT | Facility: CLINIC | Age: 86
End: 2021-03-09

## 2021-03-09 ENCOUNTER — IMMUNIZATION (OUTPATIENT)
Dept: NURSING | Facility: CLINIC | Age: 86
End: 2021-03-09
Attending: INTERNAL MEDICINE
Payer: COMMERCIAL

## 2021-03-09 PROCEDURE — 91300 PR COVID VAC PFIZER DIL RECON 30 MCG/0.3 ML IM: CPT

## 2021-03-09 PROCEDURE — 0002A PR COVID VAC PFIZER DIL RECON 30 MCG/0.3 ML IM: CPT

## 2021-03-15 ENCOUNTER — MEDICAL CORRESPONDENCE (OUTPATIENT)
Dept: HEALTH INFORMATION MANAGEMENT | Facility: CLINIC | Age: 86
End: 2021-03-15

## 2021-03-19 ENCOUNTER — PATIENT OUTREACH (OUTPATIENT)
Dept: GERIATRIC MEDICINE | Facility: CLINIC | Age: 86
End: 2021-03-19

## 2021-03-19 PROBLEM — Z76.89 HEALTH CARE HOME: Status: ACTIVE | Noted: 2018-04-12

## 2021-03-19 NOTE — PROGRESS NOTES
[facility] Care Coordination Contact      [facility] Health Plan or Product Change    CC received notification that member's health plan or health plan product changed from [employer] [facility] to [facility] effective 03/01/2021.  CC contacted daughter Yoselin and discussed change and face-to-face visit was offered. Declined need for home visit. CC reviewed previous Health Risk Assessment/LTCC and POC with dghtr and no changes noted.  Transitional HRA completed. Care Plan Summary updated and reflects current services.  Required referral authorization information communicated to CMS: Yes  Writer reviewed the following with member:  ER visits: No  Hospitalizations: No  TCU stays: No  Significant health status changes: None reported  Falls/Injuries: No  ADL/IADL changes: No  Changes in services: No  Has an appointment in April to establish care w/ Dr. Truman Gordon at the [facility].    Follow-Up Plan: Member informed of future contact, plan to f/u with member with at next regularly scheduled contact.  Contact information shared with member and family, encouraged member to call with any questions or concerns.  MARTA Lino  [facility]  [phone]  Fax: [phone]

## 2021-03-19 NOTE — PROGRESS NOTES
[facility] Care Coordination Contact    Called adult daughter Yoselin to schedule annual HRA home visit. HRA has been scheduled for Wednesday, March 7th at 10:00am.     MARTA Lino  [facility]  [phone]  Fax: [phone]

## 2021-03-25 ENCOUNTER — PATIENT OUTREACH (OUTPATIENT)
Dept: GERIATRIC MEDICINE | Facility: CLINIC | Age: 86
End: 2021-03-25

## 2021-03-25 NOTE — PROGRESS NOTES
MEMBER MEMBER  PROVIDER TYPE OF SERVICE PROCEDURE CODE MODIFIER 1 START DATE END DATE STATUS REASON UNITS AUTHORIZED   KYLEE MENENDEZ 3/21/1922 [facility] PCA PCA: Transition   3/1/2021 2021 Medically Necessary 4048     [facility] Notification    MARTA Lino  [facility]  [phone]  Fax: [phone]

## 2021-04-07 ENCOUNTER — PATIENT OUTREACH (OUTPATIENT)
Dept: GERIATRIC MEDICINE | Facility: CLINIC | Age: 86
End: 2021-04-07

## 2021-04-07 SDOH — SOCIAL STABILITY: SOCIAL NETWORK: ARE YOU MARRIED, WIDOWED, DIVORCED, SEPARATED, NEVER MARRIED, OR LIVING WITH A PARTNER?: WIDOWED

## 2021-04-07 ASSESSMENT — ACTIVITIES OF DAILY LIVING (ADL)
DEPENDENT_IADLS:: CLEANING;COOKING;LAUNDRY;SHOPPING;MEAL PREPARATION;MEDICATION MANAGEMENT;MONEY MANAGEMENT;INCONTINENCE;TRANSPORTATION

## 2021-04-07 NOTE — PROGRESS NOTES
[facility] Care Coordination Contact    [facility] Home Visit Assessment     Telephonic Health Risk Assessment with Aracelis Blakely completed on April 7, 2021    Type of residence:: Apartment - handicap accessible  Current living arrangement:: I live alone     Assessment completed with:: Patient, Children, Other(are Language Line )    Current Care Plan  Member currently receiving the following home care services: NA  Member currently receiving the following community resources: DME, PCA, Transportation Services    Medication Review  Medication reconciliation completed in Epic: Yes  Medication set-up & administration: Not set up.  Family caregiver administers medications.  Medication Risk Assessment Medication (1 or more, place referral to MTM): N/A: No risk factors identified  MTM Referral Placed: No: No risk factors idenified    Mental/Behavioral Health   Depression Screening:   PHQ-2 Total Score (Adult) - Positive if 3 or more points; Administer PHQ-9 if positive: 1  Mental health DX:: Yes   Mental health DX how managed:: None    Falls Assessment:   Fallen 2 or more times in the past year?: No   Any fall with injury in the past year?: No    ADL/IADL Dependencies:   Dependent ADLs:: Ambulation-cane, Ambulation-walker, Bathing, Dressing, Eating, Grooming, Toileting, Transfers, Incontinence  Dependent IADLs:: Cleaning, Cooking, Laundry, Shopping, Meal Preparation, Medication Management, Money Management, Incontinence, Transportation    [facility] Plan sponsored benefits: Shared information re: Silver Sneakers/gym memberships, ASA, Calcium +D.    PCA Assessment completed at visit: PCA hours pending    Elderly Waiver Eligibility: Yes, but member declines EW services; will not open to EW    Care Plan & Recommendations: Reported daily knee pain. States she has not reported knee pain to her PCP.  .   See LTCC for detailed assessment information.    Follow-Up Plan: Member informed of  future contact, plan to f/u with member with a 6 month telephone assessment.  Contact information shared with member and family, encouraged member to call with any questions or concerns at any time.    [place] care continuum providers: Please refer to Health Care Home on the Epic Problem List to view this patient's [facility] Care Plan Summary.    MARTA Lino  [facility]  [phone]  Fax: [phone]

## 2021-04-13 NOTE — PROGRESS NOTES
PCA hours will remain the same as last year (11 hours per day). Updated daughter Yoselin.    Flora Luis, [facility]  [phone]  Fax: [phone]

## 2021-04-14 ENCOUNTER — OFFICE VISIT (OUTPATIENT)
Dept: FAMILY MEDICINE | Facility: CLINIC | Age: 86
End: 2021-04-14
Payer: COMMERCIAL

## 2021-04-14 ENCOUNTER — PATIENT OUTREACH (OUTPATIENT)
Dept: GERIATRIC MEDICINE | Facility: CLINIC | Age: 86
End: 2021-04-14

## 2021-04-14 VITALS
OXYGEN SATURATION: 95 % | HEART RATE: 85 BPM | HEIGHT: 58 IN | TEMPERATURE: 98.1 F | SYSTOLIC BLOOD PRESSURE: 135 MMHG | WEIGHT: 152.8 LBS | BODY MASS INDEX: 32.07 KG/M2 | DIASTOLIC BLOOD PRESSURE: 78 MMHG

## 2021-04-14 DIAGNOSIS — R26.89 POOR BALANCE: Primary | ICD-10-CM

## 2021-04-14 LAB
FOLATE SERPL-MCNC: 80.1 NG/ML
VIT B12 SERPL-MCNC: 842 PG/ML (ref 193–986)

## 2021-04-14 PROCEDURE — 99213 OFFICE O/P EST LOW 20 MIN: CPT | Performed by: INTERNAL MEDICINE

## 2021-04-14 PROCEDURE — 36415 COLL VENOUS BLD VENIPUNCTURE: CPT | Performed by: INTERNAL MEDICINE

## 2021-04-14 PROCEDURE — 83921 ORGANIC ACID SINGLE QUANT: CPT | Performed by: INTERNAL MEDICINE

## 2021-04-14 PROCEDURE — 82607 VITAMIN B-12: CPT | Performed by: INTERNAL MEDICINE

## 2021-04-14 PROCEDURE — 82746 ASSAY OF FOLIC ACID SERUM: CPT | Performed by: INTERNAL MEDICINE

## 2021-04-14 ASSESSMENT — MIFFLIN-ST. JEOR: SCORE: 957.85
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
## 2021-04-14 NOTE — PROGRESS NOTES
Assessment & Plan     Poor balance  Very likely multifactorial.  The patient also is on Ambien and Zoloft.  Will rule out vitamin deficiency.  - Vitamin B12  - Folate  - Methylmalonic Acid  - MISCELLANEOUS DME SUPPLY OR ACCESSORY, NOT OTHERWISE SPECIFIED       See Patient Instructions    Return in about 6 months (around 10/14/2021).    Truman Gordon MD  [facility] [place] Hsu is a 89 year old who presents for the following health issues     HPI 89-year-old female presents to clinic today for evaluation.  Overall she is doing relatively well and only has a couple of concerns.  She would like medications refilled.  She needs a refill on her support hose, and she has a little bit of dizziness or poor balance.  The latter has just initiated.  There is no vertigo-like symptomatology.  New Patient/Transfer of Care  Weakness  Balance    New Patient/Transfer of Care    Review of Systems   No vertigo.  Slight loss of balance.  Lower extremity edema.      Objective    There were no vitals taken for this visit.  There is no height or weight on file to calculate BMI.  Physical Exam   Alert patient in no apparent distress today lower extremities trace edema at the ankle bilateral varicose veins noted.  Patient with knee-high support stockings today.  Her affect and mood appear appropriate.  Her spirit is bright.  Her mood is nondepressed    Office Visit on 11/25/2020   Component Date Value Ref Range Status     Sodium 11/25/2020 134  133 - 144 mmol/L Final     Potassium 11/25/2020 4.4  3.4 - 5.3 mmol/L Final     Chloride 11/25/2020 102  94 - 109 mmol/L Final     Carbon Dioxide 11/25/2020 29  20 - 32 mmol/L Final     Anion Gap 11/25/2020 3  3 - 14 mmol/L Final     Glucose 11/25/2020 99  70 - 99 mg/dL Final     Urea Nitrogen 11/25/2020 20  7 - 30 mg/dL Final     Creatinine 11/25/2020 0.84  0.52 - 1.04 mg/dL Final     GFR Estimate 11/25/2020 58* >60 mL/min/[1.73_m2] Final    Comment: Non   GFR Calc  Starting 12/18/2018, serum creatinine based estimated GFR (eGFR) will be   calculated using the Chronic Kidney Disease Epidemiology Collaboration   (CKD-EPI) equation.       GFR Estimate If Black 11/25/2020 67  >60 mL/min/[1.73_m2] Final    Comment:  GFR Calc  Starting 12/18/2018, serum creatinine based estimated GFR (eGFR) will be   calculated using the Chronic Kidney Disease Epidemiology Collaboration   (CKD-EPI) equation.       Calcium 11/25/2020 9.1  8.5 - 10.1 mg/dL Final     Bilirubin Total 11/25/2020 0.4  0.2 - 1.3 mg/dL Final     Albumin 11/25/2020 3.7  3.4 - 5.0 g/dL Final     Protein Total 11/25/2020 7.2  6.8 - 8.8 g/dL Final     Alkaline Phosphatase 11/25/2020 81  40 - 150 U/L Final     ALT 11/25/2020 16  0 - 50 U/L Final     AST 11/25/2020 18  0 - 45 U/L Final     WBC 11/25/2020 5.9  4.0 - 11.0 10e9/L Final     RBC Count 11/25/2020 4.57  3.8 - 5.2 10e12/L Final     Hemoglobin 11/25/2020 12.9  11.7 - 15.7 g/dL Final     Hematocrit 11/25/2020 39.8  35.0 - 47.0 % Final     MCV 11/25/2020 87  78 - 100 fl Final     MCH 11/25/2020 28.2  26.5 - 33.0 pg Final     MCHC 11/25/2020 32.4  31.5 - 36.5 g/dL Final     RDW 11/25/2020 11.8  10.0 - 15.0 % Final     Platelet Count 11/25/2020 213  150 - 450 10e9/L Final     % Neutrophils 11/25/2020 49.7  % Final     % Lymphocytes 11/25/2020 35.1  % Final     % Monocytes 11/25/2020 10.6  % Final     % Eosinophils 11/25/2020 4.1  % Final     % Basophils 11/25/2020 0.5  % Final     Absolute Neutrophil 11/25/2020 2.9  1.6 - 8.3 10e9/L Final     Absolute Lymphocytes 11/25/2020 2.1  0.8 - 5.3 10e9/L Final     Absolute Monocytes 11/25/2020 0.6  0.0 - 1.3 10e9/L Final     Absolute Eosinophils 11/25/2020 0.2  0.0 - 0.7 10e9/L Final     Absolute Basophils 11/25/2020 0.0  0.0 - 0.2 10e9/L Final     Diff Method 11/25/2020 Automated Method   Final

## 2021-04-14 NOTE — LETTER
April 14, 2021    Important [employer] Information    ARACELIS KENNEDY JOE  [address]   [place] [address]  Your Care Plan  Dear Aracelis,  When we spoke recently, I promised to send you a Care Plan. The plan enclosed is a summary of our discussion. It includes the steps we agreed would help you meet your health goals. In addition, I can help you with:  Fchhact-G-FgzfAG  This program is available to members who need a ride to medical and dental visits. To schedule a ride, call [phone] or [phone] (toll free). TTY/TTD: 711. You can call 8 a.m. to 8 p.m. Seven days a week. Access to a representative may be limited at times.    Cloudcam   The Cloudcam program empowers you to improve your health through education and exercise. To learn more, visit [employer], or call [employer] at [phone] (toll free) (TTY:711) from 7 a.m. - 7 p.m. Central Time, Monday-Friday.  Health Care Directive   This form helps you outline your health care wishes. You can request a form from me and I will answer any questions you have before you discuss it with your doctor.   Annual Physical  Take a key step on your path to good health and set up an annual physical at your clinic.  Questions?  Call me at [phone] Monday-Friday between 8am and 5pm.  TTY/TTD: 711. As we discussed, I plan to be in touch with you again in 6 months to follow up via phone.  Sincerely,    MARTA Lino    E-mail: [email or web address]  Phone:[phone]      Streetline            cc: member records                    Civil Rights Notice  Discrimination is against the law. [employer] does not discriminate on the basis of any of the following:    Race    Color    National Origin    Creed    Latter day    Age    Public Assistance Status    Receipt of Health Care Services    Disability (including physical or mental impairment)    Sex (including sex stereotypes and gender  identity)    Marital Status    Political Beliefs    Medical Condition    Genetic Information    Sexual Orientation    Claims Experience    Medical History    Health Status    Auxiliary Aids and Services:  [employer] provides auxiliary aids and services, like qualified interpreters or information in accessible formats, free of charge and in a timely manner, to ensure an equal opportunity to participate in our health care programs. Contact [employer] Customer Service at [employer]/contact medicaid or call [phone] (toll free); TTY:715 or at [employer]/contactTechnology Keiretsucaid.    Language Assistance Services:  [employer] provides translated documents and spoken language interpreting, free of charge and in a timely manner, when language assistance services are necessary to ensure limited English speakers have meaningful access to our information and services. Contact [employer] at [phone] (toll free); TTY: 601 or [email or web address].     Civil Rights Complaints  You have the right to file a discrimination complaint if you believe you were treated in a discriminatory way by [employer]. You may contact any of the following four agencies directly to file a discrimination complaint.    U.S. Department of Health and Human Services  Office for Civil Rights (OCR)  You have the right to file a complaint with the OCR, a federal agency, if you believe you have been discriminated against because of any of the following:    Race    Disability    Color    Sex    National Origin    Age      Contact the OCR directly to file a complaint:         Director         U.S. Department of Health and Human Services  Office for Civil Rights         [address] [place] [address], [place] [address]         [phone] (Voice)         [phone] (TDD)         Complaint Portal - https://ocrportal.hhs.gov/ocr/portal/lobby.jsf     [place] Department of Human Rights ([facility])  In [place], you have the right to  file a complaint with the MDHR if you believe you have been discriminated against because of any of the following:      Race    Color    National Origin    Yarsani    Creed    Sex    Sexual Orientation    Marital Status    Public Assistance Status    Disability    Contact the MDHR directly to file a complaint:         [place] Department of Human Rights         [facility], [address] [place] [address]         [phone] (voice)          [phone] (toll free)         711 or [phone] ([place] Relay)         [phone] (Fax)         [email or web address] (Email)     [place] Department of Human Services (DHS)  You have the right to file a complaint with [facility] if you believe you have been discriminated against in our health care programs because of any of the following:    Race    Color    National Origin    Creed    Yarsani    Age    Public Assistance Status    Receipt of Health Care Services    Disability (including physical or mental impairment)    Sex (including sex stereotypes and gender identity)    Marital Status    Political Beliefs    Medical Condition    Genetic Information    Sexual Orientation    Claims Experience    Medical History    Health Status    Complaints must be in writing and filed within 180 days of the date you discovered the alleged discrimination. The complaint must contain your name and address and describe the discrimination you are complaining about. After we get your complaint, we will review it and notify you in writing about whether we have authority to investigate. If we do, we will investigate the complaint.      [facility] will notify you in writing of the investigation s outcome. You have a right to appeal the outcome if you disagree with the decision. To appeal, you must send a written request to have [facility] review the investigation outcome period. Be brief and state why you disagree with the decision. Include additional information you think is important.       If you file a complaint in this way, the people who work for the agency named in the complaint cannot retaliate against you. This means they cannot punish you in any way for filing a complaint. Filing a complaint in this way does not stop you from seeking out other legal or administration actions.     Contact DHS directly to file a discrimination complaint:        Civil Rights Coordinator        [facility]        Equal Opportunity and Access Division        [address] [address]        [place] [address] [phone] (voice) or use your preferred relay service     [employer] Complaint Notice   You have the right to file a complaint with [employer] if you believe you have been discriminated against because of any of the following:       Medical condition    Health status    Receipt of health care services    Claims experience    Medical history    Genetic information    Disability (including mental or physical impairment)    Marital status    Age    Sex (including sex stereotypes and gender identity)    Sexual orientation    National origin    Race    Color    Nondenominational    Creed    Public assistance status    Political beliefs    You can file a complaint and ask for help in filing a complaint in person or by mail, phone, fax, or email at:     [employer] Civil Rights Coordinator  [facility]  [address], [place] [address] [phone] (voice and fax) or TTY:254  Email: [email or web address]    American Indians can continue to use [place] and  Health Services (IHS) clinics. We will not require prior approval or impose any conditions for you to get services at these clinics. For elders age 65 years and older this includes Elderly Waiver (EW) services accessed through the [employer]. If a doctor or other provider in a [place] or IHS clinic refers you to a provider in our network, we will not require you to see your primary care provider prior to the  referral.    For accessible formats of this publication or assistance with equal or access to our services, visit [email or web address], or call [phone] (toll free) or use your preferred relay service.

## 2021-04-29 LAB — METHYLMALONATE SERPL-SCNC: 0.29 UMOL/L (ref 0–0.4)

## 2021-04-29 NOTE — PROGRESS NOTES
[facility] Notification:    MEMBER MEMBER  PROVIDER TYPE OF SERVICE PROCEDURE CODE MODIFIER 1 START DATE END DATE STATUS REASON UNITS AUTHORIZED   HUANELIASRAMBO GEMMA 3/21/1922 [employer] PCA PCA: Assess Same   2021 10/31/2021 Medically Necessary 6732   KULWANTRAMBO GEMMA 3/21/1922 [employer] [facility] [employer] PCA PCA: Assess Same   2021 Medically Necessary 7964     MARTA Lino  [facility]  [phone]  Fax: [phone]

## 2021-05-07 NOTE — PROGRESS NOTES
POC & PCA sig sheet returned signed.  Secure emailed copy of PCA sig sheet to [facility] PCA dept and rightfaxed copy to PCA agency.  Iona Dhillon  Case Management Specialist  [facility]  [phone]

## 2021-05-21 ENCOUNTER — HOSPITAL ENCOUNTER (OUTPATIENT)
Facility: CLINIC | Age: 86
Setting detail: OBSERVATION
Discharge: HOME | End: 2021-05-23
Attending: EMERGENCY MEDICINE | Admitting: INTERNAL MEDICINE
Payer: COMMERCIAL

## 2021-05-21 ENCOUNTER — APPOINTMENT (OUTPATIENT)
Dept: CT IMAGING | Facility: CLINIC | Age: 86
End: 2021-05-21
Attending: EMERGENCY MEDICINE
Payer: COMMERCIAL

## 2021-05-21 DIAGNOSIS — R10.9 ABDOMINAL PAIN OF UNKNOWN ETIOLOGY: ICD-10-CM

## 2021-05-21 DIAGNOSIS — K59.00 CONSTIPATION, UNSPECIFIED CONSTIPATION TYPE: Primary | ICD-10-CM

## 2021-05-21 DIAGNOSIS — E87.1 HYPONATREMIA: ICD-10-CM

## 2021-05-21 DIAGNOSIS — R11.2 INTRACTABLE VOMITING WITH NAUSEA, UNSPECIFIED VOMITING TYPE: ICD-10-CM

## 2021-05-21 LAB
ALBUMIN SERPL-MCNC: 3.8 G/DL (ref 3.4–5)
ALP SERPL-CCNC: 63 U/L (ref 40–150)
ALT SERPL W P-5'-P-CCNC: 18 U/L (ref 0–50)
ANION GAP SERPL CALCULATED.3IONS-SCNC: 7 MMOL/L (ref 3–14)
AST SERPL W P-5'-P-CCNC: 15 U/L (ref 0–45)
BASOPHILS # BLD AUTO: 0 10E9/L (ref 0–0.2)
BASOPHILS NFR BLD AUTO: 0.1 %
BILIRUB SERPL-MCNC: 1 MG/DL (ref 0.2–1.3)
BUN SERPL-MCNC: 10 MG/DL (ref 7–30)
CALCIUM SERPL-MCNC: 8.8 MG/DL (ref 8.5–10.1)
CHLORIDE SERPL-SCNC: 93 MMOL/L (ref 94–109)
CO2 SERPL-SCNC: 26 MMOL/L (ref 20–32)
CREAT SERPL-MCNC: 0.61 MG/DL (ref 0.52–1.04)
DIFFERENTIAL METHOD BLD: ABNORMAL
EOSINOPHIL # BLD AUTO: 0 10E9/L (ref 0–0.7)
EOSINOPHIL NFR BLD AUTO: 0.3 %
ERYTHROCYTE [DISTWIDTH] IN BLOOD BY AUTOMATED COUNT: 11.2 % (ref 10–15)
GFR SERPL CREATININE-BSD FRML MDRD: 75 ML/MIN/{1.73_M2}
GLUCOSE SERPL-MCNC: 149 MG/DL (ref 70–99)
HCT VFR BLD AUTO: 38.1 % (ref 35–47)
HGB BLD-MCNC: 12.9 G/DL (ref 11.7–15.7)
IMM GRANULOCYTES # BLD: 0 10E9/L (ref 0–0.4)
IMM GRANULOCYTES NFR BLD: 0.6 %
INTERPRETATION ECG - MUSE: NORMAL
LABORATORY COMMENT REPORT: NORMAL
LACTATE BLD-SCNC: 0.7 MMOL/L (ref 0.7–2)
LIPASE SERPL-CCNC: 68 U/L (ref 73–393)
LYMPHOCYTES # BLD AUTO: 0.7 10E9/L (ref 0.8–5.3)
LYMPHOCYTES NFR BLD AUTO: 10.3 %
MCH RBC QN AUTO: 28.2 PG (ref 26.5–33)
MCHC RBC AUTO-ENTMCNC: 33.9 G/DL (ref 31.5–36.5)
MCV RBC AUTO: 83 FL (ref 78–100)
MONOCYTES # BLD AUTO: 0.3 10E9/L (ref 0–1.3)
MONOCYTES NFR BLD AUTO: 3.5 %
NEUTROPHILS # BLD AUTO: 6.1 10E9/L (ref 1.6–8.3)
NEUTROPHILS NFR BLD AUTO: 85.2 %
NRBC # BLD AUTO: 0 10*3/UL
NRBC BLD AUTO-RTO: 0 /100
PLATELET # BLD AUTO: 194 10E9/L (ref 150–450)
POTASSIUM SERPL-SCNC: 3.7 MMOL/L (ref 3.4–5.3)
PROT SERPL-MCNC: 7.3 G/DL (ref 6.8–8.8)
RBC # BLD AUTO: 4.58 10E12/L (ref 3.8–5.2)
SARS-COV-2 RNA RESP QL NAA+PROBE: NEGATIVE
SODIUM SERPL-SCNC: 126 MMOL/L (ref 133–144)
SPECIMEN SOURCE: NORMAL
WBC # BLD AUTO: 7.2 10E9/L (ref 4–11)

## 2021-05-21 PROCEDURE — 250N000011 HC RX IP 250 OP 636: Performed by: EMERGENCY MEDICINE

## 2021-05-21 PROCEDURE — 99220 PR INITIAL OBSERVATION CARE,LEVEL III: CPT | Performed by: INTERNAL MEDICINE

## 2021-05-21 PROCEDURE — 83605 ASSAY OF LACTIC ACID: CPT | Performed by: EMERGENCY MEDICINE

## 2021-05-21 PROCEDURE — C9803 HOPD COVID-19 SPEC COLLECT: HCPCS

## 2021-05-21 PROCEDURE — 96374 THER/PROPH/DIAG INJ IV PUSH: CPT | Mod: 59

## 2021-05-21 PROCEDURE — 93005 ELECTROCARDIOGRAM TRACING: CPT

## 2021-05-21 PROCEDURE — 87635 SARS-COV-2 COVID-19 AMP PRB: CPT | Performed by: EMERGENCY MEDICINE

## 2021-05-21 PROCEDURE — 258N000003 HC RX IP 258 OP 636: Performed by: EMERGENCY MEDICINE

## 2021-05-21 PROCEDURE — 80053 COMPREHEN METABOLIC PANEL: CPT | Performed by: EMERGENCY MEDICINE

## 2021-05-21 PROCEDURE — 99285 EMERGENCY DEPT VISIT HI MDM: CPT | Mod: 25

## 2021-05-21 PROCEDURE — 96361 HYDRATE IV INFUSION ADD-ON: CPT

## 2021-05-21 PROCEDURE — 85025 COMPLETE CBC W/AUTO DIFF WBC: CPT | Performed by: EMERGENCY MEDICINE

## 2021-05-21 PROCEDURE — 74177 CT ABD & PELVIS W/CONTRAST: CPT

## 2021-05-21 PROCEDURE — 83690 ASSAY OF LIPASE: CPT | Performed by: EMERGENCY MEDICINE

## 2021-05-21 PROCEDURE — 250N000009 HC RX 250: Performed by: EMERGENCY MEDICINE

## 2021-05-21 RX ORDER — GUAIFENESIN 600 MG/1
1200 TABLET, EXTENDED RELEASE ORAL 2 TIMES DAILY PRN
COMMUNITY
End: 2024-06-13

## 2021-05-21 RX ORDER — SODIUM CHLORIDE 9 MG/ML
INJECTION, SOLUTION INTRAVENOUS CONTINUOUS
Status: DISCONTINUED | OUTPATIENT
Start: 2021-05-21 | End: 2021-05-22

## 2021-05-21 RX ORDER — MECLIZINE HCL 12.5 MG 12.5 MG/1
12.5 TABLET ORAL 3 TIMES DAILY PRN
COMMUNITY
End: 2022-08-17

## 2021-05-21 RX ORDER — ALPRAZOLAM 0.25 MG
0.25 TABLET ORAL 3 TIMES DAILY PRN
Status: ON HOLD | COMMUNITY
End: 2024-06-08

## 2021-05-21 RX ORDER — IOPAMIDOL 755 MG/ML
75 INJECTION, SOLUTION INTRAVASCULAR ONCE
Status: COMPLETED | OUTPATIENT
Start: 2021-05-21 | End: 2021-05-21

## 2021-05-21 RX ORDER — ONDANSETRON 2 MG/ML
4 INJECTION INTRAMUSCULAR; INTRAVENOUS EVERY 30 MIN PRN
Status: DISCONTINUED | OUTPATIENT
Start: 2021-05-21 | End: 2021-05-23 | Stop reason: HOSPADM

## 2021-05-21 RX ORDER — MORPHINE SULFATE 4 MG/ML
4 INJECTION, SOLUTION INTRAMUSCULAR; INTRAVENOUS
Status: DISCONTINUED | OUTPATIENT
Start: 2021-05-21 | End: 2021-05-22

## 2021-05-21 RX ADMIN — SODIUM CHLORIDE 62 ML: 9 INJECTION, SOLUTION INTRAVENOUS at 21:09

## 2021-05-21 RX ADMIN — IOPAMIDOL 75 ML: 755 INJECTION, SOLUTION INTRAVENOUS at 21:09

## 2021-05-21 RX ADMIN — SODIUM CHLORIDE 1000 ML: 9 INJECTION, SOLUTION INTRAVENOUS at 20:18

## 2021-05-21 RX ADMIN — ONDANSETRON 4 MG: 2 INJECTION INTRAMUSCULAR; INTRAVENOUS at 22:07

## 2021-05-21 ASSESSMENT — ENCOUNTER SYMPTOMS
FEVER: 0
DIARRHEA: 1
ABDOMINAL PAIN: 1

## 2021-05-22 PROBLEM — K29.70 GASTRITIS: Status: ACTIVE | Noted: 2021-05-22

## 2021-05-22 LAB
ANION GAP SERPL CALCULATED.3IONS-SCNC: 6 MMOL/L (ref 3–14)
BUN SERPL-MCNC: 8 MG/DL (ref 7–30)
CALCIUM SERPL-MCNC: 8.5 MG/DL (ref 8.5–10.1)
CHLORIDE SERPL-SCNC: 95 MMOL/L (ref 94–109)
CO2 SERPL-SCNC: 25 MMOL/L (ref 20–32)
CREAT SERPL-MCNC: 0.54 MG/DL (ref 0.52–1.04)
ERYTHROCYTE [DISTWIDTH] IN BLOOD BY AUTOMATED COUNT: 11.2 % (ref 10–15)
GFR SERPL CREATININE-BSD FRML MDRD: 78 ML/MIN/{1.73_M2}
GLUCOSE SERPL-MCNC: 130 MG/DL (ref 70–99)
HCT VFR BLD AUTO: 38.3 % (ref 35–47)
HGB BLD-MCNC: 12.9 G/DL (ref 11.7–15.7)
LACTATE BLD-SCNC: 0.7 MMOL/L (ref 0.7–2)
MCH RBC QN AUTO: 27.7 PG (ref 26.5–33)
MCHC RBC AUTO-ENTMCNC: 33.7 G/DL (ref 31.5–36.5)
MCV RBC AUTO: 82 FL (ref 78–100)
PLATELET # BLD AUTO: 205 10E9/L (ref 150–450)
POTASSIUM SERPL-SCNC: 3.8 MMOL/L (ref 3.4–5.3)
RBC # BLD AUTO: 4.66 10E12/L (ref 3.8–5.2)
SODIUM SERPL-SCNC: 126 MMOL/L (ref 133–144)
SODIUM SERPL-SCNC: 133 MMOL/L (ref 133–144)
SODIUM SERPL-SCNC: 135 MMOL/L (ref 133–144)
SODIUM SERPL-SCNC: 136 MMOL/L (ref 133–144)
WBC # BLD AUTO: 8.3 10E9/L (ref 4–11)

## 2021-05-22 PROCEDURE — 36415 COLL VENOUS BLD VENIPUNCTURE: CPT | Performed by: INTERNAL MEDICINE

## 2021-05-22 PROCEDURE — 250N000009 HC RX 250: Performed by: INTERNAL MEDICINE

## 2021-05-22 PROCEDURE — 85027 COMPLETE CBC AUTOMATED: CPT | Performed by: INTERNAL MEDICINE

## 2021-05-22 PROCEDURE — 83605 ASSAY OF LACTIC ACID: CPT | Performed by: INTERNAL MEDICINE

## 2021-05-22 PROCEDURE — 96361 HYDRATE IV INFUSION ADD-ON: CPT

## 2021-05-22 PROCEDURE — 80048 BASIC METABOLIC PNL TOTAL CA: CPT | Performed by: INTERNAL MEDICINE

## 2021-05-22 PROCEDURE — 258N000003 HC RX IP 258 OP 636: Performed by: INTERNAL MEDICINE

## 2021-05-22 PROCEDURE — G0378 HOSPITAL OBSERVATION PER HR: HCPCS

## 2021-05-22 PROCEDURE — 250N000013 HC RX MED GY IP 250 OP 250 PS 637: Performed by: INTERNAL MEDICINE

## 2021-05-22 PROCEDURE — 250N000011 HC RX IP 250 OP 636: Performed by: INTERNAL MEDICINE

## 2021-05-22 PROCEDURE — 99225 PR SUBSEQUENT OBSERVATION CARE,LEVEL II: CPT | Performed by: INTERNAL MEDICINE

## 2021-05-22 PROCEDURE — 84295 ASSAY OF SERUM SODIUM: CPT | Mod: 91 | Performed by: INTERNAL MEDICINE

## 2021-05-22 RX ORDER — PROCHLORPERAZINE 25 MG
12.5 SUPPOSITORY, RECTAL RECTAL EVERY 12 HOURS PRN
Status: DISCONTINUED | OUTPATIENT
Start: 2021-05-22 | End: 2021-05-23 | Stop reason: HOSPADM

## 2021-05-22 RX ORDER — NALOXONE HYDROCHLORIDE 0.4 MG/ML
0.2 INJECTION, SOLUTION INTRAMUSCULAR; INTRAVENOUS; SUBCUTANEOUS
Status: DISCONTINUED | OUTPATIENT
Start: 2021-05-22 | End: 2021-05-23 | Stop reason: HOSPADM

## 2021-05-22 RX ORDER — NALOXONE HYDROCHLORIDE 0.4 MG/ML
0.4 INJECTION, SOLUTION INTRAMUSCULAR; INTRAVENOUS; SUBCUTANEOUS
Status: DISCONTINUED | OUTPATIENT
Start: 2021-05-22 | End: 2021-05-23 | Stop reason: HOSPADM

## 2021-05-22 RX ORDER — ACETAMINOPHEN 325 MG/1
650 TABLET ORAL EVERY 4 HOURS PRN
Status: DISCONTINUED | OUTPATIENT
Start: 2021-05-22 | End: 2021-05-23 | Stop reason: HOSPADM

## 2021-05-22 RX ORDER — SODIUM CHLORIDE AND POTASSIUM CHLORIDE 150; 900 MG/100ML; MG/100ML
INJECTION, SOLUTION INTRAVENOUS CONTINUOUS
Status: DISCONTINUED | OUTPATIENT
Start: 2021-05-22 | End: 2021-05-22

## 2021-05-22 RX ORDER — POLYETHYLENE GLYCOL 3350 17 G/17G
17 POWDER, FOR SOLUTION ORAL DAILY PRN
Status: DISCONTINUED | OUTPATIENT
Start: 2021-05-22 | End: 2021-05-23 | Stop reason: HOSPADM

## 2021-05-22 RX ORDER — ONDANSETRON 4 MG/1
4 TABLET, ORALLY DISINTEGRATING ORAL EVERY 6 HOURS PRN
Status: DISCONTINUED | OUTPATIENT
Start: 2021-05-22 | End: 2021-05-23 | Stop reason: HOSPADM

## 2021-05-22 RX ORDER — ALBUTEROL SULFATE 0.83 MG/ML
2.5 SOLUTION RESPIRATORY (INHALATION) EVERY 4 HOURS PRN
Status: DISCONTINUED | OUTPATIENT
Start: 2021-05-22 | End: 2021-05-23 | Stop reason: HOSPADM

## 2021-05-22 RX ORDER — DEXTROSE MONOHYDRATE 50 MG/ML
INJECTION, SOLUTION INTRAVENOUS CONTINUOUS
Status: ACTIVE | OUTPATIENT
Start: 2021-05-22 | End: 2021-05-22

## 2021-05-22 RX ORDER — POLYETHYLENE GLYCOL 3350 17 G/17G
17 POWDER, FOR SOLUTION ORAL DAILY PRN
Qty: 510 G | COMMUNITY
Start: 2021-05-22 | End: 2021-05-27

## 2021-05-22 RX ORDER — AMLODIPINE BESYLATE 2.5 MG/1
2.5 TABLET ORAL DAILY
Status: DISCONTINUED | OUTPATIENT
Start: 2021-05-22 | End: 2021-05-23 | Stop reason: HOSPADM

## 2021-05-22 RX ORDER — BISACODYL 10 MG
10 SUPPOSITORY, RECTAL RECTAL DAILY PRN
Status: DISCONTINUED | OUTPATIENT
Start: 2021-05-22 | End: 2021-05-23 | Stop reason: HOSPADM

## 2021-05-22 RX ORDER — ACETAMINOPHEN 650 MG/1
650 SUPPOSITORY RECTAL EVERY 4 HOURS PRN
Status: DISCONTINUED | OUTPATIENT
Start: 2021-05-22 | End: 2021-05-23 | Stop reason: HOSPADM

## 2021-05-22 RX ORDER — DEXTROSE MONOHYDRATE 50 MG/ML
INJECTION, SOLUTION INTRAVENOUS CONTINUOUS
Status: DISCONTINUED | OUTPATIENT
Start: 2021-05-23 | End: 2021-05-23

## 2021-05-22 RX ORDER — AZELASTINE HYDROCHLORIDE 0.5 MG/ML
1 SOLUTION/ DROPS OPHTHALMIC 2 TIMES DAILY
Status: DISCONTINUED | OUTPATIENT
Start: 2021-05-22 | End: 2021-05-23 | Stop reason: HOSPADM

## 2021-05-22 RX ORDER — MORPHINE SULFATE 2 MG/ML
4 INJECTION, SOLUTION INTRAMUSCULAR; INTRAVENOUS
Status: DISCONTINUED | OUTPATIENT
Start: 2021-05-22 | End: 2021-05-23 | Stop reason: HOSPADM

## 2021-05-22 RX ORDER — DEXTROSE MONOHYDRATE 50 MG/ML
INJECTION, SOLUTION INTRAVENOUS CONTINUOUS
Status: DISCONTINUED | OUTPATIENT
Start: 2021-05-22 | End: 2021-05-22

## 2021-05-22 RX ORDER — ONDANSETRON 2 MG/ML
4 INJECTION INTRAMUSCULAR; INTRAVENOUS EVERY 6 HOURS PRN
Status: DISCONTINUED | OUTPATIENT
Start: 2021-05-22 | End: 2021-05-23 | Stop reason: HOSPADM

## 2021-05-22 RX ORDER — PROCHLORPERAZINE MALEATE 5 MG
5 TABLET ORAL EVERY 6 HOURS PRN
Status: DISCONTINUED | OUTPATIENT
Start: 2021-05-22 | End: 2021-05-23 | Stop reason: HOSPADM

## 2021-05-22 RX ORDER — MECLIZINE HCL 12.5 MG 12.5 MG/1
12.5 TABLET ORAL 3 TIMES DAILY PRN
Status: DISCONTINUED | OUTPATIENT
Start: 2021-05-22 | End: 2021-05-23 | Stop reason: HOSPADM

## 2021-05-22 RX ORDER — ALPRAZOLAM 0.25 MG
0.25 TABLET ORAL 3 TIMES DAILY PRN
Status: DISCONTINUED | OUTPATIENT
Start: 2021-05-22 | End: 2021-05-23 | Stop reason: HOSPADM

## 2021-05-22 RX ORDER — PANTOPRAZOLE SODIUM 40 MG/1
40 TABLET, DELAYED RELEASE ORAL
Status: DISCONTINUED | OUTPATIENT
Start: 2021-05-22 | End: 2021-05-23 | Stop reason: HOSPADM

## 2021-05-22 RX ORDER — ASPIRIN 81 MG/1
81 TABLET ORAL DAILY
Status: DISCONTINUED | OUTPATIENT
Start: 2021-05-22 | End: 2021-05-23 | Stop reason: HOSPADM

## 2021-05-22 RX ADMIN — PANTOPRAZOLE SODIUM 40 MG: 40 TABLET, DELAYED RELEASE ORAL at 08:53

## 2021-05-22 RX ADMIN — ASPIRIN 81 MG: 81 TABLET, COATED ORAL at 21:16

## 2021-05-22 RX ADMIN — POTASSIUM CHLORIDE AND SODIUM CHLORIDE: 900; 150 INJECTION, SOLUTION INTRAVENOUS at 00:45

## 2021-05-22 RX ADMIN — POLYETHYLENE GLYCOL 3350 17 G: 17 POWDER, FOR SOLUTION ORAL at 10:54

## 2021-05-22 RX ADMIN — AZELASTINE HYDROCHLORIDE 1 DROP: 0.5 SOLUTION/ DROPS INTRAOCULAR at 21:16

## 2021-05-22 RX ADMIN — SERTRALINE HYDROCHLORIDE 50 MG: 50 TABLET ORAL at 08:54

## 2021-05-22 RX ADMIN — DEXTROSE MONOHYDRATE: 50 INJECTION, SOLUTION INTRAVENOUS at 21:15

## 2021-05-22 RX ADMIN — AMLODIPINE BESYLATE 2.5 MG: 2.5 TABLET ORAL at 08:53

## 2021-05-22 RX ADMIN — DEXTROSE MONOHYDRATE: 50 INJECTION, SOLUTION INTRAVENOUS at 17:42

## 2021-05-22 NOTE — ED TRIAGE NOTES
Pt was at an [facility] clinic; has vomited 3x today.  EMS gave 50mcg of fentanyl for abd pain; pain went from an eight to zero.

## 2021-05-22 NOTE — ED NOTES
[facility]  ED Nurse Handoff Report    ED Chief complaint: Abdominal Pain      ED Diagnosis:   Final diagnoses:   Abdominal pain of unknown etiology   Intractable vomiting with nausea, unspecified vomiting type   Hyponatremia       Code Status: Full Code    Allergies: No Known Allergies    Patient Story: Pt has had N/V today.  Focused Assessment:  Alert and oriented.  Pt speaks Farsi.  Duaghter at bedside interpreting.  Hypertensive but other VSS.  Pt went to the clinic and family ended up calling EMS from clinic.  Pt has had some constipation per daughter and today has had 3-4 episodes of emesis.  Pt feels as though her abdomen was distending.  Up with 2 to bedside commode.    Treatments and/or interventions provided: zofran, 1L bolus  Patient's response to treatments and/or interventions:     To be done/followed up on inpatient unit:  See epic    Does this patient have any cognitive concerns?:     Activity level - Baseline/Home:  Independent  Activity Level - Current:   Stand with assist x2    Patient's Preferred language: Farsi   Needed?: No    Isolation: None  Infection: Not Applicable  Patient tested for COVID 19 prior to admission: YES  Bariatric?: No    Vital Signs:   Vitals:    05/21/21 1951 05/21/21 2101   BP: (!) 172/93 (!) 181/98   Pulse: 107 109   Resp: 16    Temp: 98.3  F (36.8  C)    TempSrc: Oral    SpO2: 94% 91%   Weight: 68 kg (150 lb)        Cardiac Rhythm:     Was the PSS-3 completed:   Yes  What interventions are required if any?               Family Comments: daughter  OBS brochure/video discussed/provided to patient/family: Yes              Name of person given brochure if not patient:               Relationship to patient:     For the majority of the shift this patient's behavior was Green.   Behavioral interventions performed were .    ED NURSE PHONE NUMBER: [id number]

## 2021-05-22 NOTE — ED PROVIDER NOTES
History   Chief Complaint:  Abdominal Pain       HPI   Aracelis Blakely is a 89 year old female with history of hypertension, chronic constipation, and GERD who presents with abdominal pain. Patient had an onset of abdominal pain and 3 episodes of diarrhea today and was seen at [facility]. She was given 50 mcg of fentanyl by EMS en route, which she says has taken away her pain. She denies any fevers. She has never had a bowel obstruction.  Her daughter did give her Dulcolax today.  She is status post cholecystectomy and hysterectomy.  Nobody else is sick.    History and review of systems limited by language barrier as patient is Yakut speaking.    Review of Systems   Constitutional: Negative for fever.   Gastrointestinal: Positive for abdominal pain and diarrhea.   All other systems reviewed and are negative.    Allergies:  No Known Allergies    Medications:  Norvasc   Prilosec   Ecotrin   Fosamax   Zoloft   Senna  Prinivil   Zyrtec   Lasix   Ambien   Lexapro   Robitussin     Past Medical History:    Hypertension  Chronic cough   Depression   Insomnia   H pylori infection   Osteoporosis   GERD  DJD  Anxiety   Chronic constipation     Past Surgical History:    Carpal tunnel release  Choloecystectomy    Blepharoplasty    Hysterectomy   Cataracts     Social History:  Patient presents to the ED with daughter.    Physical Exam     Patient Vitals for the past 24 hrs:   BP Temp Temp src Pulse Resp SpO2 Weight   05/21/21 2101 (!) 181/98 -- -- 109 -- 91 % --   05/21/21 1951 (!) 172/93 98.3  F (36.8  C) Oral 107 16 94 % 68 kg (150 lb)       Physical Exam  General: Well-nourished,appears nauseated and to be in pain  Eyes: PERRL, conjunctivae pink no scleral icterus or conjunctival injection  ENT:  Moist mucus membranes, posterior oropharynx clear without erythema or exudates  Respiratory:  Lungs clear to auscultation bilaterally, no crackles/rubs/wheezes.  Good air movement  CV: Normal rate and rhythm, no  murmurs/rubs/gallops  GI:  Abdomen soft and protuberant.  Normoactive BS.  Generalized tenderness, no guarding or rebound  Skin: Warm, dry.  No rashes or petechiae  Musculoskeletal: No peripheral edema or calf tenderness  Neuro: Alert and oriented to person/place/time  Psychiatric: Normal affect    Emergency Department Course     ECG:  ECG taken at 2031, ECG read at 2034  Sinus tachycardia. Left axis deviation.  No significant change.  Rate 104 bpm. OK interval 192 ms. QRS duration 88 ms. QT/QTc 326/428 ms. P-R-T axes 68 -39 73.     Imaging:  CT abdomen pelvis with contrast:  1.  No acute findings in the abdomen and pelvis.  2.  Age-indeterminate wedge compression deformity of the L4 vertebral  body, new since 2016. Correlate clinically to exclude an acute  fracture.  3.  The gallbladder appears to be surgically absent. Stable mild  intrahepatic and extrahepatic biliary ductal dilatation, likely  related to postcholecystectomy reservoir effect. Reading per radiology.     Laboratory:  CBC: WBC: 7.2, HGB: 12.9, PLT: 194  CMP: Glucose 149 (H), Sodium: 126 (H), Chloride: 93 (low), o/w WNL (Creatinine: 0.61)  Lactic acid (Resulted 2008): 0.7  Lipase: 68 (low)    Asymptomatic SARS-CoV-2-COVID-19 by PCR: negative    Emergency Department Course:    Reviewed:  nursing notes, vitals, past medical history and care everywhere    Assessments:    1950 Initial assessment     2155 I rechecked the patient and discussed the results of her workup thus far.     Consults:     2235 I spoke with the hospitalist Dr. Sanchez, who accepts the patient for admission.    Interventions:  2018 NS 1L IV Bolus  2207 Zofran 4mg IV     Disposition:  The patient was admitted to the hospital under the care of Dr. Sanchez.     Impression & Plan   Covid-19  Aracelis Blakely was evaluated during a global COVID-19 pandemic, which necessitated consideration that the patient might be at risk for infection with the SARS-CoV-2 virus that causes COVID-19.    Applicable protocols for evaluation were followed during the patient's care.   COVID-19 was considered as part of the patient's evaluation. The plan for testing is:  a test was obtained during this visit.    Medical Decision Making:  Aracelis Blakely is a 89 year old female who comes today with abdominal pain and recurrent vomiting.  Her abdomen is tender.  I considered a broad differential including bowel obstruction, mesenteric ischemia, aortic emergency, colitis and pancreatitis.  Laboratory studies were reassuring with the exception of some hyponatremia.  CT scan shows no acute abnormalities.  Lactic acid is normal and EKG is a sinus rhythm.  This decreases my suspicion of mesenteric ischemia.  No cause for her symptoms has been identified, however, she continues to have abdominal pain and she has vomited twice in the emergency department.  Given the high risk of morbidity and mortality for abdominal pain in the elderly and her ongoing symptoms, we will admit her for observation for ongoing pain control and antiemetics.  The patient did agree to this.  Dr. Sanchez graciously agreed to admit the patient.    Diagnosis:    ICD-10-CM    1. Abdominal pain of unknown etiology  R10.9 Asymptomatic SARS-CoV-2 COVID-19 Virus (Coronavirus) by PCR   2. Intractable vomiting with nausea, unspecified vomiting type  R11.2    3. Hyponatremia  E87.1        Scribe Disclosure:  I, He Parrish, am serving as a scribe at 7:41 PM on 5/21/2021 to document services personally performed by Zeenat Berrios MD based on my observations and the provider's statements to me.              Zeenat Berrios MD  05/22/21 0022

## 2021-05-22 NOTE — ED NOTES
Bed: ED27  Expected date: 5/21/21  Expected time: 7:31 PM  Means of arrival: Ambulance  Comments:  Bernard 89f abd pain ETA 1936

## 2021-05-22 NOTE — PHARMACY-ADMISSION MEDICATION HISTORY
Pharmacy Medication History  Admission medication history interview status for the 5/21/2021  admission is complete. See EPIC admission navigator for prior to admission medications     Location of Interview: Patient room  Medication history sources: Patient's family/friend (Daughter) and Surescripts    Significant changes made to the medication list:  Added: Xanax, Mucinex, meclizine    In the past week, patient estimated taking medication this percent of the time: greater than 90%    Additional medication history information:   None    Medication reconciliation completed by provider prior to medication history? No    Time spent in this activity: 15 mins    Prior to Admission medications    Medication Sig Last Dose Taking? Auth Provider   acetaminophen (TYLENOL) 325 MG tablet Take 325-650 mg by mouth every 4 hours as needed for mild pain Max acetaminophen dose: 4000 mg in 24 hrs  at PRN Yes Reported, Patient   albuterol (2.5 MG/3ML) 0.083% neb solution Inhale 2.5 mg into the lungs every 4 hours as needed   at PRN Yes Reported, Patient   ALPRAZolam (XANAX) 0.25 MG tablet Take 0.25 mg by mouth 3 times daily as needed for anxiety  at PRN Yes Unknown, Entered By History   amLODIPine (NORVASC) 2.5 MG tablet TAKE 1 TABLET BY MOUTH EVERY DAY 5/21/2021 at AM Yes Truman Palma MD   Ascorbic Acid (VITAMIN C/BIOFLAVONOIDS/ROSEHP PO) Take 1 tablet by mouth daily  5/21/2021 at AM Yes Reported, Patient   ASPIRIN LOW DOSE 81 MG EC tablet TAKE 1 TABLET BY MOUTH EVERY DAY WITH FOOD 5/21/2021 at AM Yes Truman Palma MD   azelastine (OPTIVAR) 0.05 % SOLN ophthalmic solution Place 1 drop into both eyes 2 times daily 5/20/2021 at Unknown time Yes Vadim Seo MD   cholecalciferol (VITAMIN D3) 1000 UNIT tablet Take 1,000 Units by mouth daily  5/21/2021 at AM Yes Reported, Patient   guaiFENesin (MUCINEX) 600 MG 12 hr tablet Take 1,200 mg by mouth 2 times daily as needed for congestion  at PRN Yes Unknown, Entered By History    meclizine (ANTIVERT) 12.5 MG tablet Take 12.5 mg by mouth 3 times daily as needed (Benign paroxysmal positional vertigo, unspecified laterality)  at PRN Yes Unknown, Entered By History   Multiple Vitamin (DAILY-NETTA) TABS TAKE 1 TABLET BY MOUTH EVERY DAY 5/21/2021 at AM Yes Truman Palma MD   Omega-3 Fatty Acids (OMEGA-3 FISH OIL) 1200 MG CAPS Take 1,200 mg by mouth daily  5/21/2021 at AM Yes Reported, Patient   omeprazole (PRILOSEC) 20 MG DR capsule TAKE ONE CAPSULE BY MOUTH EVERY DAY BEFORE A MEAL 5/21/2021 at AM Yes January Tavarez PA-C   OYSTER SHELL CALCIUM/D 500-200 MG-UNIT tablet TAKE 1 TABLET BY MOUTH TWICE A DAY WITH MEALS 5/21/2021 at AM Yes Truman Palma MD   senna (SM SENNA LAXATIVE) 8.6 MG tablet TAKE 3 TABLETS BY MOUTH EVERY DAY  Yes Truman Palma MD   sertraline (ZOLOFT) 50 MG tablet TAKE 1 TABLET BY MOUTH EVERY DAY 5/21/2021 at AM Yes Truman Plama MD   zolpidem (AMBIEN) 5 MG tablet TAKE 1/2 TABLET BY MOUTH AT BEDTIME AS NEEDED FOR SLEEP  at PRN Yes Truman Palma MD   diclofenac (VOLTAREN) 1 % topical gel Apply 4 g topically 2 times daily   Truman Palma MD   order for DME WALKER; with wheels, and brakes, and a seat.   Truman Palma MD   order for DME Equipment being ordered:  Compression stockings,knee high,mild compression   Truman Palma MD   order for DME Knee high compression stockings.  Light compression.  16-20mm Hg.  Ok 3 pairs.   Reported, Patient   OVER-THE-COUNTER 550 mg 3 times daily as needed (Takes 3x daily when senokot isn't available or if senokot doesn't work for her.)   Reported, Patient       The information provided in this note is only as accurate as the sources available at the time of update(s)     Yuliet Senior PharmD

## 2021-05-22 NOTE — PROGRESS NOTES
Observation goals PRIOR TO DISCHARGE    Comments:   1. Resolution of vomiting and nausea- Partially met   2. Correction of K -Met  3. Able to tolerate PO- Not met

## 2021-05-22 NOTE — PLAN OF CARE
@0043 Received the patient from ED. No home medications. A&O to self. Pt speaks farsi. Jabber at bedside. Denies pain. Regular diet. IVF 0.9% sodium chloride + KCl 20 mEq/L @100 ML/hr. [place] Ax1 GB[place] [facility] Continue to monitor.          Observation goals PRIOR TO DISCHARGE    Comments:   1. Resolution of vomiting and nausea- Partially met   2. Correction of K -Met  3. Able to tolerate PO- Not met

## 2021-05-22 NOTE — PROVIDER NOTIFICATION
MD Notification    Notified Person: MD    Notified Person Name:Delbert Sky    Notification Date/Time:05/22/2021 @1843    Notification Interaction:webpage    Purpose of Notification:Sodium is 135. okay to discharge?    Orders Received:Do not discharge at this time, family and pt will be notified. Redraw sodium at 10 pm. Dextrose to run for 5 hrs.     Comments:

## 2021-05-22 NOTE — DISCHARGE SUMMARY
[facility]    Discharge Summary  Hospitalist    Date of Admission:  5/21/2021  Date of Discharge:  5/23/2021  Discharging Provider: Venu Mandujano MD    Discharge Diagnoses      Abdominal pain and vomiting most likely due to constipation resolved  Chronic constipation   Hypovolemic Hyponatremia  Depression  GERD  Essential HTN    Hospital Course:    Aracelis Blakely is a 89 year old female admitted on 5/21/2021. She was brought in by EMS with abd pain, nausea.  Even though it was initially reported that patient had diarrhea, the daughter confirmed that her main issue was constipation and not diarrhea.    Abdominal pain, nausea and vomiting  Hyponatremia  -Patient presented with abdominal pain nausea and vomiting.  Even though it was reported initially that she had diarrhea, the doctor confirmed that diarrhea was not the issue and actually constipation was an ongoing problem.  Patient's abdominal pain and nausea improved.  The next morning she had good bowel movement and she felt she was close to baseline.  She was standby assist with her cane, she would be going home with her daughter.  Her hyponatremia was thought to be due to hypovolemia, improved to 134.  Was monitored overnight as we did not want to correct her sodium very rapidly.  Sodium now stable at 134, patient overall feeling much better and wanted to go home.       Venu Mandujano MD    Significant Results and Procedures   See below    Pending Results     Unresulted Labs Ordered in the Past 30 Days of this Admission     No orders found for last 31 day(s).          Code Status   Full Code       Primary Care Physician   Truman Palma    Physical Exam   Temp: 98.3  F (36.8  C) Temp src: Oral BP: (!) 146/74 Pulse: 92   Resp: 18 SpO2: 94 % O2 Device: None (Room air)       Evaluated in the presence of daughter at the bedside.    Constitutional: AAOX3, NAD  Respiratory: CTA B/L, Normal WOB  Cardiovascular: RRR, No  murmur  GI: Soft, Non- tender, BS- normoactive  Neuro: CN- grossly intact.     Discharge Disposition   Discharged to home  Condition at discharge: Stable    Consultations This Hospital Stay   None    Time Spent on this Encounter   Venu CDEENO MD, personally saw the patient today and spent less than or equal to 30 minutes discharging this patient.    Discharge Orders      Follow-up and recommended labs and tests    Follow up with primary care provider, Truman Palma, within 7 days for hospital follow- up.  The following labs/tests are recommended:     Activity    Your activity upon discharge: activity as tolerated     Full Code     Diet    Follow this diet upon discharge: Orders Placed This Encounter      Regular Diet Adult       Discharge Medications   Current Discharge Medication List      START taking these medications    Details   polyethylene glycol (MIRALAX) 17 GM/Dose powder Take 17 g by mouth daily as needed for constipation  Qty: 510 g    Associated Diagnoses: Constipation, unspecified constipation type         CONTINUE these medications which have NOT CHANGED    Details   acetaminophen (TYLENOL) 325 MG tablet Take 325-650 mg by mouth every 4 hours as needed for mild pain Max acetaminophen dose: 4000 mg in 24 hrs      albuterol (2.5 MG/3ML) 0.083% neb solution Inhale 2.5 mg into the lungs every 4 hours as needed       ALPRAZolam (XANAX) 0.25 MG tablet Take 0.25 mg by mouth 3 times daily as needed for anxiety      amLODIPine (NORVASC) 2.5 MG tablet TAKE 1 TABLET BY MOUTH EVERY DAY  Qty: 90 tablet, Refills: 1    Associated Diagnoses: Hypertension, unspecified type      Ascorbic Acid (VITAMIN C/BIOFLAVONOIDS/ROSEHP PO) Take 1 tablet by mouth daily       ASPIRIN LOW DOSE 81 MG EC tablet TAKE 1 TABLET BY MOUTH EVERY DAY WITH FOOD  Qty: 90 tablet, Refills: 1    Associated Diagnoses: Hypertension, unspecified type      azelastine (OPTIVAR) 0.05 % SOLN ophthalmic solution Place 1 drop into both eyes 2  times daily      cholecalciferol (VITAMIN D3) 1000 UNIT tablet Take 1,000 Units by mouth daily       guaiFENesin (MUCINEX) 600 MG 12 hr tablet Take 1,200 mg by mouth 2 times daily as needed for congestion      meclizine (ANTIVERT) 12.5 MG tablet Take 12.5 mg by mouth 3 times daily as needed (Benign paroxysmal positional vertigo, unspecified laterality)      Multiple Vitamin (DAILY-NETTA) TABS TAKE 1 TABLET BY MOUTH EVERY DAY  Qty: 90 tablet, Refills: 1    Associated Diagnoses: Osteoporosis without current pathological fracture, unspecified osteoporosis type      Omega-3 Fatty Acids (OMEGA-3 FISH OIL) 1200 MG CAPS Take 1,200 mg by mouth daily       omeprazole (PRILOSEC) 20 MG DR capsule TAKE ONE CAPSULE BY MOUTH EVERY DAY BEFORE A MEAL  Qty: 90 capsule, Refills: 3    Comments: DX Code Needed  .  Associated Diagnoses: Gastroesophageal reflux disease without esophagitis      OYSTER SHELL CALCIUM/D 500-200 MG-UNIT tablet TAKE 1 TABLET BY MOUTH TWICE A DAY WITH MEALS  Qty: 180 tablet, Refills: 1    Associated Diagnoses: Osteoporosis without current pathological fracture, unspecified osteoporosis type      senna (SM SENNA LAXATIVE) 8.6 MG tablet TAKE 3 TABLETS BY MOUTH EVERY DAY  Qty: 90 tablet, Refills: 11    Associated Diagnoses: Constipation, unspecified constipation type      sertraline (ZOLOFT) 50 MG tablet TAKE 1 TABLET BY MOUTH EVERY DAY  Qty: 90 tablet, Refills: 3    Comments: DX Code Needed  .  Associated Diagnoses: Depression, unspecified depression type      zolpidem (AMBIEN) 5 MG tablet TAKE 1/2 TABLET BY MOUTH AT BEDTIME AS NEEDED FOR SLEEP  Qty: 15 tablet, Refills: 0    Comments: This request is for a new prescription for a controlled substance as required by Federal/State law..  Associated Diagnoses: Insomnia, unspecified type      diclofenac (VOLTAREN) 1 % topical gel Apply 4 g topically 2 times daily  Qty: 100 g, Refills: 4    Associated Diagnoses: Closed compression fracture of L4 lumbar vertebra with  routine healing, subsequent encounter      !! order for DME WALKER; with wheels, and brakes, and a seat.  Qty: 1 Device, Refills: 0    Associated Diagnoses: Falls frequently; Poor balance      !! order for DME Equipment being ordered:  Compression stockings,knee high,mild compression  Qty: 3 Units, Refills: 3    Associated Diagnoses: Varicose veins of both lower extremities, unspecified whether complicated      !! order for DME Knee high compression stockings.  Light compression.  16-20mm Hg.  Ok 3 pairs.      OVER-THE-COUNTER 550 mg 3 times daily as needed (Takes 3x daily when senokot isn't available or if senokot doesn't work for her.)       !! - Potential duplicate medications found. Please discuss with provider.        Allergies   No Known Allergies  Data   Most Recent 3 CBC's:  Recent Labs   Lab Test 05/22/21  0632 05/21/21 2008 11/25/20  1524   WBC 8.3 7.2 5.9   HGB 12.9 12.9 12.9   MCV 82 83 87    194 213      Most Recent 3 BMP's:  Recent Labs   Lab Test 05/22/21  0632 05/21/21 2008 11/25/20  1524   * 126* 134   POTASSIUM 3.8 3.7 4.4   CHLORIDE 95 93* 102   CO2 25 26 29   BUN 8 10 20   CR 0.54 0.61 0.84   ANIONGAP 6 7 3   JINA 8.5 8.8 9.1   * 149* 99     Most Recent 2 LFT's:  Recent Labs   Lab Test 05/21/21 2008 11/25/20  1524   AST 15 18   ALT 18 16   ALKPHOS 63 81   BILITOTAL 1.0 0.4     Most Recent INR's and Anticoagulation Dosing History:  Anticoagulation Dose History     There is no flowsheet data to display.        Most Recent 3 Troponin's:  Recent Labs   Lab Test 09/17/16  1623   TROPI 0.023     Most Recent Cholesterol Panel:  Recent Labs   Lab Test 08/20/19  1558   CHOL 213*   *   HDL 47*   TRIG 136     Most Recent 6 Bacteria Isolates From Any Culture (See EPIC Reports for Culture Details):No lab results found.  Most Recent TSH, T4 and A1c Labs:  Recent Labs   Lab Test 08/01/18  1056   TSH 1.59       Results for orders placed or performed during the hospital encounter of  05/21/21   CT Abdomen Pelvis w Contrast    Narrative    CT ABDOMEN PELVIS W CONTRAST 5/21/2021 9:20 PM    CLINICAL HISTORY: Abdominal distension; Abdominal pain, acute,  nonlocalized    TECHNIQUE: CT scan of the abdomen and pelvis was performed following  injection of IV contrast. Multiplanar reformats were obtained. Dose  reduction techniques were used.  CONTRAST: 75mL Isovue-370    COMPARISON: 9/17/2016.    FINDINGS:   LOWER CHEST: Mild bibasilar dependent atelectasis. A few punctate  calcified granulomas in the right lower lobe. Stable mild linear  pericardial calcifications.    HEPATOBILIARY: The gallbladder is not definitively visualized and may  be surgically absent. Diffuse mild intrahepatic and extrahepatic  biliary ductal dilatation has not significantly changed and may be  related to postcholecystectomy reservoir effect. No focal lesions of  the liver. No calcified stones in the ducts.    PANCREAS: Normal.    SPLEEN: Stable splenic capsular calcifications related to old injury.    ADRENAL GLANDS: Normal.    KIDNEYS/BLADDER: No hydronephrosis or perinephric stranding. Stable  subcentimeter hypodense lesions in the right kidney, likely cysts.  Excreted contrast in the renal collecting system.    BOWEL: No small bowel or colonic obstruction. Normal appendix. Colonic  diverticulosis. No inflammatory changes.    PELVIC ORGANS: No pelvic masses. Hysterectomy.    ADDITIONAL FINDINGS: No lymphadenopathy. No abdominal aortic aneurysm.  No free fluid or extraluminal air.    MUSCULOSKELETAL: Mild degenerative changes in the spine. Wedge  compression deformity of the L4 vertebral body is new since 2016. No  suspicious lesions in the bones.      Impression    IMPRESSION:   1.  No acute findings in the abdomen and pelvis.  2.  Age-indeterminate wedge compression deformity of the L4 vertebral  body, new since 2016. Correlate clinically to exclude an acute  fracture.  3.  The gallbladder appears to be surgically absent.  Stable mild  intrahepatic and extrahepatic biliary ductal dilatation, likely  related to postcholecystectomy reservoir effect.    BILL CHAUDHRY MD

## 2021-05-22 NOTE — PROVIDER NOTIFICATION
MD Notification    Notified Person: MD    Notified Person Name: Delbert    Notification Date/Time: 5/22/2021 @ 3890    Notification Interaction: Text page    Purpose of Notification:     Can we order additional stool softener? Pt has not been able to pass stool. Tolerating a regular diet.    Orders Received:    Comments:

## 2021-05-22 NOTE — H&P
[facility]    History and Physical - Hospitalist Service       Date of Admission:  5/21/2021    Assessment & Plan   Aracelis Blakely is a 89 year old female admitted on 5/21/2021. She was brought in by EMS after daughter took her to [facility] with abd pain, nausea, diarrhea.  She ate left over beef the night before, denies any recent antibiotic use, denies any blood or mucous.  She feels weak.    1. Acute Gastroenteritis  -- suspect maybe from spoiled food  -- CT negative, abd exam is benign, no fever and normal wbc  -- supportive therapy with IVF, antiemetics  -- diet as tolerated    2. Hyponatremia  -- suspect hypovolemic hyponatremia based on her history  -- will give IVF and follow Na with anticipation this will improve  -- K level normal    3. Depression  -- resume zoloft and lexapro once verified    4. GERD  -- will give protonix 40 mg daily    5. HTN  -- resume norvasc once verified.     Diet: NPO for Medical/Clinical Reasons Except for: Meds    DVT Prophylaxis: Low Risk/Ambulatory with no VTE prophylaxis indicated  Vieira Catheter: not present  Code Status:   Full         Disposition Plan   Expected discharge: Tomorrow, recommended to prior living arrangement once resolution of gastroenteritis, normalization of Na and able to tolerate diet..  Entered: Reji Sanchez MD 05/21/2021, 11:05 PM     The patient's care was discussed with the Patient's Family.    Reji Sanchez MD  [facility]  Contact information available via [facility] Paging/Directory      ______________________________________________________________________    Chief Complaint   Abdominal pain with nausea and diarrhea    History is obtained from the patient and patient's daughter who also provides interpretive services as the patient speaks Farsi    History of Present Illness   Aracelis Blakely is a 89 year old female who presents with her daughter for the above complaints.  She woke  up not feeling well and her last meal was the night before when she at some left over beef and pasta.  Throughout the day she had worsening abdominal pain with associated nausea and diarrhea.  She has hx of GERD and prior constipation but no hx of bowel obstruction or recent antibiotic use.  She was seen at  and was brought from there by EMS to [facility].  En route she received fentanyl for her pain.    In ED she was afebrile, tachycardic, Na of 126, K of 3.7, normal kidney function, lipase and LFT's.  Her cbc is also normal.  Covid is negative, CT of abd and pelvis was normal as well.  She was given IVF and admitted for gastroenteritis    Review of Systems    The 10 point Review of Systems is negative other than noted in the HPI or here. She denies any fever, chills or sweats, headache, chest pain or sob, syncope, focal neurological complaints.    Past Medical History    I have reviewed this patient's medical history and updated it with pertinent information if needed.   Past Medical History:   Diagnosis Date     Hypertension        Past Surgical History   I have reviewed this patient's surgical history and updated it with pertinent information if needed.  Past Surgical History:   Procedure Laterality Date     CHOLECYSTECTOMY       RELEASE CARPAL TUNNEL Right 6/13/2016       Social History   I have reviewed this patient's social history and updated it with pertinent information if needed.  Social History     Tobacco Use     Smoking status: Never Smoker     Smokeless tobacco: Never Used   Substance Use Topics     Alcohol use: No     Drug use: No       Family History   I have reviewed this patient's family history and updated it with pertinent information if needed.  Family History   Problem Relation Age of Onset     Unknown/Adopted Mother      Unknown/Adopted Father        Prior to Admission Medications   Prior to Admission Medications   Prescriptions Last Dose Informant Patient Reported? Taking?   ALPRAZolam  (XANAX) 0.25 MG tablet  at PRN Daughter Yes Yes   Sig: Take 0.25 mg by mouth 3 times daily as needed for anxiety   ASPIRIN LOW DOSE 81 MG EC tablet 2021 at AM Daughter No Yes   Sig: TAKE 1 TABLET BY MOUTH EVERY DAY WITH FOOD   Ascorbic Acid (VITAMIN C/BIOFLAVONOIDS/ROSEHP PO) 2021 at AM Daughter Yes Yes   Sig: Take 1 tablet by mouth daily    Multiple Vitamin (DAILY-NETTA) TABS 2021 at AM Daughter No Yes   Sig: TAKE 1 TABLET BY MOUTH EVERY DAY   OVER-THE-COUNTER  Daughter Yes No   Si mg 3 times daily as needed (Takes 3x daily when senokot isn't available or if senokot doesn't work for her.)   OYSTER SHELL CALCIUM/D 500-200 MG-UNIT tablet 2021 at AM Daughter No Yes   Sig: TAKE 1 TABLET BY MOUTH TWICE A DAY WITH MEALS   Omega-3 Fatty Acids (OMEGA-3 FISH OIL) 1200 MG CAPS 2021 at AM Daughter Yes Yes   Sig: Take 1,200 mg by mouth daily    acetaminophen (TYLENOL) 325 MG tablet  at PRN Daughter Yes Yes   Sig: Take 325-650 mg by mouth every 4 hours as needed for mild pain Max acetaminophen dose: 4000 mg in 24 hrs   albuterol (2.5 MG/3ML) 0.083% neb solution  at PRN Daughter Yes Yes   Sig: Inhale 2.5 mg into the lungs every 4 hours as needed    amLODIPine (NORVASC) 2.5 MG tablet 2021 at AM Daughter No Yes   Sig: TAKE 1 TABLET BY MOUTH EVERY DAY   azelastine (OPTIVAR) 0.05 % SOLN ophthalmic solution 2021 at Unknown time Daughter Yes Yes   Sig: Place 1 drop into both eyes 2 times daily   cholecalciferol (VITAMIN D3) 1000 UNIT tablet 2021 at AM Daughter Yes Yes   Sig: Take 1,000 Units by mouth daily    diclofenac (VOLTAREN) 1 % topical gel  Daughter No No   Sig: Apply 4 g topically 2 times daily   guaiFENesin (MUCINEX) 600 MG 12 hr tablet  at PRN Daughter Yes Yes   Sig: Take 1,200 mg by mouth 2 times daily as needed for congestion   meclizine (ANTIVERT) 12.5 MG tablet  at PRN Daughter Yes Yes   Sig: Take 12.5 mg by mouth 3 times daily as needed (Benign paroxysmal positional vertigo,  unspecified laterality)   omeprazole (PRILOSEC) 20 MG DR capsule 5/21/2021 at AM Daughter No Yes   Sig: TAKE ONE CAPSULE BY MOUTH EVERY DAY BEFORE A MEAL   order for DME  Daughter Yes No   Sig: Knee high compression stockings.  Light compression.  16-20mm Hg.  Ok 3 pairs.   order for DME  Daughter No No   Sig: WALKER; with wheels, and brakes, and a seat.   order for DME  Daughter No No   Sig: Equipment being ordered:  Compression stockings,knee high,mild compression   senna (SM SENNA LAXATIVE) 8.6 MG tablet  Daughter No Yes   Sig: TAKE 3 TABLETS BY MOUTH EVERY DAY   sertraline (ZOLOFT) 50 MG tablet 5/21/2021 at AM Daughter No Yes   Sig: TAKE 1 TABLET BY MOUTH EVERY DAY   zolpidem (AMBIEN) 5 MG tablet  at PRN Daughter No Yes   Sig: TAKE 1/2 TABLET BY MOUTH AT BEDTIME AS NEEDED FOR SLEEP      Facility-Administered Medications: None     Allergies   No Known Allergies    Physical Exam   Vital Signs: Temp: 98.3  F (36.8  C) Temp src: Oral BP: (!) 181/98 Pulse: 109   Resp: 16 SpO2: 91 % O2 Device: None (Room air)    Weight: 150 lbs 0 oz    General Appearance: mildly uncomfortable  Eyes: no icterus  HEENT: NCAT  Respiratory: CTA  Cardiovascular: tachy  GI: soft, NT, ND  Lymph/Hematologic: no PARDEEP  Genitourinary: deferred  Skin: warm and dry  Musculoskeletal: no edema  Neurologic: moves all 4 ext, CN intact  Psychiatric: anxious    Data   Data reviewed today: I reviewed all medications, new labs and imaging results over the last 24 hours. I personally reviewed the abdominal CT image(s) showing no acute findings.    Recent Labs   Lab 05/22/21  0632 05/21/21 2008   WBC 8.3 7.2   HGB 12.9 12.9   MCV 82 83    194   NA  --  126*   POTASSIUM  --  3.7   CHLORIDE  --  93*   CO2  --  26   BUN  --  10   CR  --  0.61   ANIONGAP  --  7   JINA  --  8.8   GLC  --  149*   ALBUMIN  --  3.8   PROTTOTAL  --  7.3   BILITOTAL  --  1.0   ALKPHOS  --  63   ALT  --  18   AST  --  15   LIPASE  --  68*

## 2021-05-22 NOTE — PROGRESS NOTES
RECEIVING UNIT ED HANDOFF REVIEW    ED Nurse Handoff Report was reviewed by: Cullen Gutierrez RN on May 22, 2021 at 12:05 AM

## 2021-05-22 NOTE — PROVIDER NOTIFICATION
MD Notification    Notified Person: MD    Notified Person Name: Delbert    Notification Date/Time: 5/22/2021    Notification Interaction: text page    Purpose of Notification:    Pt passed large BM per patient report.  Pt did not call as instructed and flushed before I could visualize stool.    Orders Received:    Comments:

## 2021-05-23 VITALS
WEIGHT: 155.8 LBS | SYSTOLIC BLOOD PRESSURE: 127 MMHG | TEMPERATURE: 98 F | DIASTOLIC BLOOD PRESSURE: 59 MMHG | RESPIRATION RATE: 18 BRPM | OXYGEN SATURATION: 97 % | BODY MASS INDEX: 32.56 KG/M2 | HEART RATE: 85 BPM

## 2021-05-23 LAB — SODIUM SERPL-SCNC: 134 MMOL/L (ref 133–144)

## 2021-05-23 PROCEDURE — 250N000013 HC RX MED GY IP 250 OP 250 PS 637: Performed by: INTERNAL MEDICINE

## 2021-05-23 PROCEDURE — 36415 COLL VENOUS BLD VENIPUNCTURE: CPT | Performed by: INTERNAL MEDICINE

## 2021-05-23 PROCEDURE — G0378 HOSPITAL OBSERVATION PER HR: HCPCS

## 2021-05-23 PROCEDURE — 96361 HYDRATE IV INFUSION ADD-ON: CPT

## 2021-05-23 PROCEDURE — 84295 ASSAY OF SERUM SODIUM: CPT | Performed by: INTERNAL MEDICINE

## 2021-05-23 PROCEDURE — 99217 PR OBSERVATION CARE DISCHARGE: CPT | Performed by: INTERNAL MEDICINE

## 2021-05-23 RX ADMIN — ASPIRIN 81 MG: 81 TABLET, COATED ORAL at 08:01

## 2021-05-23 RX ADMIN — AMLODIPINE BESYLATE 2.5 MG: 2.5 TABLET ORAL at 08:01

## 2021-05-23 RX ADMIN — SERTRALINE HYDROCHLORIDE 50 MG: 50 TABLET ORAL at 08:01

## 2021-05-23 RX ADMIN — PANTOPRAZOLE SODIUM 40 MG: 40 TABLET, DELAYED RELEASE ORAL at 06:33

## 2021-05-23 RX ADMIN — AZELASTINE HYDROCHLORIDE 1 DROP: 0.5 SOLUTION/ DROPS INTRAOCULAR at 08:01

## 2021-05-23 NOTE — PROGRESS NOTES
Patient has been discharged @5/23/21 @1015. Discharge instructions and education reviewed, medication schedule and follow up appts discussed. Teach back completed. Pt has no questions. All belongings sent with pt.

## 2021-05-23 NOTE — PROGRESS NOTES
Xcoverage: paged by EMIGDIO hendrix Na of 136; as per the notes- plan to continue D5W at 75 ml/h for tonight- reordered.    Suzie Shell MD

## 2021-05-23 NOTE — PROVIDER NOTIFICATION
MD Notification    Notified Person: MD    Notified Person Name:Sumaya    Notification Date/Time:5/22/21@2346    Notification Interaction:paged via [employer] web    Purpose of Notification:-Pt's rechecked Na+ level was 136, per MD note, will like to continue D5% till AM, order was already d/c, needs new order,     Orders Received:Orders placed    Comments:

## 2021-05-23 NOTE — PLAN OF CARE
Observation goals PRIOR TO DISCHARGE     Comments:     1. Resolution of vomiting and nausea -Met     2. Correction of K -,Met    3. Able to tolerate PO-met        Pt is A&O, Farsi speaking, VSS on RA, Up with SBA, amb to bathroom and voiding o.k,was to discharge yesterday but was kept to monitor Na+ level because it went up too fast, pt placed on 5% dextrose overnight as Na+ level was still 136,goal Na+ was 130-132, fluid was discontinued by MD this morning.Shyann reg diet, PIV s/l,plan is recheck Na+ this morning and possible discharge today.

## 2021-05-23 NOTE — PROGRESS NOTES
[facility]    Medicine Progress Note - Hospitalist Service        Date of Admission:  5/21/2021  7:39 PM    Assessment & Plan:   Aracelis Blakely is a 89 year old female admitted on 5/21/2021. She was brought in by EMS with abd pain, nausea.  Even though it was initially reported that patient had diarrhea, the daughter confirmed that her main issue was constipation and not diarrhea.     Abdominal pain, nausea and vomiting  -Patient presented with abdominal pain nausea and vomiting.  Even though it was reported initially that she had diarrhea, the doctor confirmed that diarrhea was not the issue and actually constipation was an ongoing problem. Patient's abdominal pain and nausea improved. She had good bowel movement and she felt she was close to baseline.    -bowel regimen in place    Hypovolemic hyponatremia  -Sodium at presentation 126. Improved to 135. Rate of rise 9 mEq in 24 hours. Would not want faster rise.  -D5 W @75 ml/hr for 5 hours  -Goal sodium 130-132  -recheck sodium at 10:00 PM; if sodium still rising; continue D5W till AM    HTN  -continue PTA amlodipine      Diet: Regular Diet Adult  Diet     DVT Prophylaxis: Pneumatic Compression Devices   Vieira Catheter: not present  Code Status: Full Code     Disposition Plan    Expected discharge: Tomorrow, home.   Entered: Venu Mandujano MD 05/22/2021, 7:43 PM        The patient's care was discussed with the Bedside Nurse, Patient and Patient's Family.    Venu Mandujano MD  Hospitalist Service  [facility]    ______________________________________________________________________    Interval History   Overall feels better. Constipation, nausea/vomiting resolved. No abd pain. Sodium improving. Evaluated in the presence of dtr at bedside.    Data reviewed today: I reviewed all medications, new labs and imaging results over the last 24 hours. I personally reviewed no images or EKG's today.    Physical Exam   Vital  "signs:  Temp: 95.4  F (35.2  C) Temp src: Oral BP: 133/72 Pulse: 95   Resp: 18 SpO2: 95 % O2 Device: None (Room air)     Weight: 70.7 kg (155 lb 12.8 oz)  Estimated body mass index is 32.56 kg/m  as calculated from the following:    Height as of 4/14/21: 1.473 m (4' 10\").    Weight as of this encounter: 70.7 kg (155 lb 12.8 oz).      Wt Readings from Last 2 Encounters:   05/22/21 70.7 kg (155 lb 12.8 oz)   04/14/21 69.3 kg (152 lb 12.8 oz)       Gen: AAOX3, NAD  Resp: CTA B/L, normal WOB, no crackles, no wheezes  CVS: RRR, no murmur  Abd/GI: Soft, non-tender. BS- normoactive.  No G/R/R  Skin: Warm, dry no rashes  MSK: No joint deformities, no pedal edema  Neuro- CN- intact. No focal deficits.    Data   Recent Labs   Lab 05/22/21  1800 05/22/21  1412 05/22/21  0632 05/21/21 2008   WBC  --   --  8.3 7.2   HGB  --   --  12.9 12.9   MCV  --   --  82 83   PLT  --   --  205 194    133 126* 126*   POTASSIUM  --   --  3.8 3.7   CHLORIDE  --   --  95 93*   CO2  --   --  25 26   BUN  --   --  8 10   CR  --   --  0.54 0.61   ANIONGAP  --   --  6 7   JINA  --   --  8.5 8.8   GLC  --   --  130* 149*   ALBUMIN  --   --   --  3.8   PROTTOTAL  --   --   --  7.3   BILITOTAL  --   --   --  1.0   ALKPHOS  --   --   --  63   ALT  --   --   --  18   AST  --   --   --  15   LIPASE  --   --   --  68*       Recent Results (from the past 24 hour(s))   CT Abdomen Pelvis w Contrast    Narrative    CT ABDOMEN PELVIS W CONTRAST 5/21/2021 9:20 PM    CLINICAL HISTORY: Abdominal distension; Abdominal pain, acute,  nonlocalized    TECHNIQUE: CT scan of the abdomen and pelvis was performed following  injection of IV contrast. Multiplanar reformats were obtained. Dose  reduction techniques were used.  CONTRAST: 75mL Isovue-370    COMPARISON: 9/17/2016.    FINDINGS:   LOWER CHEST: Mild bibasilar dependent atelectasis. A few punctate  calcified granulomas in the right lower lobe. Stable mild linear  pericardial calcifications.    HEPATOBILIARY: " The gallbladder is not definitively visualized and may  be surgically absent. Diffuse mild intrahepatic and extrahepatic  biliary ductal dilatation has not significantly changed and may be  related to postcholecystectomy reservoir effect. No focal lesions of  the liver. No calcified stones in the ducts.    PANCREAS: Normal.    SPLEEN: Stable splenic capsular calcifications related to old injury.    ADRENAL GLANDS: Normal.    KIDNEYS/BLADDER: No hydronephrosis or perinephric stranding. Stable  subcentimeter hypodense lesions in the right kidney, likely cysts.  Excreted contrast in the renal collecting system.    BOWEL: No small bowel or colonic obstruction. Normal appendix. Colonic  diverticulosis. No inflammatory changes.    PELVIC ORGANS: No pelvic masses. Hysterectomy.    ADDITIONAL FINDINGS: No lymphadenopathy. No abdominal aortic aneurysm.  No free fluid or extraluminal air.    MUSCULOSKELETAL: Mild degenerative changes in the spine. Wedge  compression deformity of the L4 vertebral body is new since 2016. No  suspicious lesions in the bones.      Impression    IMPRESSION:   1.  No acute findings in the abdomen and pelvis.  2.  Age-indeterminate wedge compression deformity of the L4 vertebral  body, new since 2016. Correlate clinically to exclude an acute  fracture.  3.  The gallbladder appears to be surgically absent. Stable mild  intrahepatic and extrahepatic biliary ductal dilatation, likely  related to postcholecystectomy reservoir effect.    BILL CHAUDHRY MD     Medications       amLODIPine  2.5 mg Oral Daily     pantoprazole  40 mg Oral QAM AC     sertraline  50 mg Oral Daily

## 2021-05-23 NOTE — PLAN OF CARE
Observation goals PRIOR TO DISCHARGE     Comments:     1. Resolution of vomiting and nausea -Met     2. Correction of K -,Met    3. Able to tolerate PO-met

## 2021-05-23 NOTE — PROGRESS NOTES
Observation goals PRIOR TO DISCHARGE     1. Resolution of vomiting and nausea=MET  2. Correction of K=MET  3. Able to tolerate PO=MET        A&OX4. Speaks Farsi, Jabber in the room.  SBA with cane and gait belt.  O2=RA. Diet=regular. Denies pain. Iv had to replaced and took 6 attempts before IV infusion could be started. Now infusing Dextrose 5% at 75ml/hr. Vy=046, next draw at 2300. Due to delay in infusion start, the full 5 hrs requested pushed lab out 1 hr. No nausea/vomiting. Belly soft and non tender. Lactic=0.7 Plan to discharge tomorrow pending labs.

## 2021-05-24 ENCOUNTER — PATIENT OUTREACH (OUTPATIENT)
Dept: GERIATRIC MEDICINE | Facility: CLINIC | Age: 86
End: 2021-05-24

## 2021-05-24 NOTE — PROGRESS NOTES
TRANSITIONS OF CARE (RADHA) LOG   RADHA tasks should be completed by the CC within one (1) business day of notification of each transition. Follow up contact with member is required after return to their usual care setting.  Note:  If CC finds out about the transitions fifteen (15) days or more after the member has returned to their usual care setting, no RADHA log is needed. However, the CC should check in with the member to discuss the transition process, any changes needed to the care plan and document it in a case note.    Member Name:  Aracelis Blakely [facility] Member ID[facility] [id number]   Product: [facility] Care Coordinator Contact:  MARTA Lino Agency/County/Care System: [place]   Transition Communication Actions from Care Management Contact   Transition #1   Notification Date: 05/24/2021 Transition Date: 05/21/2021 Transition From: Home     Is this the member s usual care setting?               yes Transition To: Hospital, [facility]   Transition Type:  Unplanned  Reason for Admission/Comments: Abdominal pain and vomiting most likely due to constipation resolved, Chronic constipation, Hypovolemic Hyponatremia, Depression, GERD, Essential HTN.  CC contacted Hospital /discharge planner: NA discharged before notified of hospitalization.  CC reached out to: NA discharged before receiving hospital notification.  Reviewed and update care plan as needed.  Notified community service providers and placed services None on hold as needed.  Transition log initiated.   PCP notified of hospitalization via EMR. MARTA Lino [facility] [phone], Fax: [phone]   Shared CC contact info, care plan/services with receiving setting--Date completed: NA discharged before notified   Notified PCP of transition--Date completed: 05/21/21    via  EMR   Transition #2      Notification Date: 05/24/21      Transition To:  Home  Transition Date: 05/23/21      Transition Type:    Planned  Notified PCP -- Date completed: 05/23/21              *Complete additional tasks below, if this transition is a return to usual care setting.      Comments:  CC contacted adult daughter Yoselin and reviewed discharge summary.  Member has a follow-up appointment with PCP in 7 days: No: Offered Assistance with setting up a follow up appointment.  Hansa is assisting with scheduling an appointment.  Member has had a change in condition: No  Home visit needed: No  Care plan reviewed and updated.  New referrals placed: No  Transition log completed.   PCP notified of transition back to home via EMR.  Flora Luis, [facility] [phone], Fax: [phone]      *Complete tasks below when the member is discharging TO their usual care setting within one (1) business day of notification.  For situations where the Care Coordinator is notified of the discharge prior to the date of discharge, the Care Coordinator must follow up with the member or designated representative to confirm that discharge actually occurred and discuss required RADHA tasks as outlined in the RADHA Instructions.  (This includes situations where it may be a  new  usual care setting for the member. (i.e., a community member who decides upon permanent nursing home placement following hospitalization and rehab).    Date completed: 05/24/21 Communicated with member or their designated representative about the following:  care transition process; about changes to the member s health status; plan of care updates; education about transitions and how to prevent unplanned transitions/readmissions  Four Pillars for Optimal Transition:    Check  Yes  - if the member, family member and/or SNF/facility staff manages the following:    If  No  provide explanation in the comments section.          [x]  Yes     []  No     Does the member have a follow-up appointment scheduled with primary care or specialist? (Mental health  hospitalizations--the appt. should be w/in 7 days)   [x]  Yes     []  No     Can the member manage their medications or is there a system in place to manage medications (e.g. home care set-up)?         [x]  Yes     []  No     Can the member verbalize warning signs and symptoms to watch for and how to respond?         [x]  Yes     []  No     Does the member use a Personal Health Care Record?  Check  Yes  if visit summary, discharge summary, and/or healthcare summary are being used as a PHR.                                                                                                                                                                                    [x] Yes      [] No      Have you updated the member s care plan?  If  No  provide explanation in comments.   Comments:  Flora Luis, [facility] [phone], Fax: [phone]

## 2021-05-27 ENCOUNTER — OFFICE VISIT (OUTPATIENT)
Dept: FAMILY MEDICINE | Facility: CLINIC | Age: 86
End: 2021-05-27
Payer: COMMERCIAL

## 2021-05-27 VITALS
BODY MASS INDEX: 31.77 KG/M2 | DIASTOLIC BLOOD PRESSURE: 89 MMHG | SYSTOLIC BLOOD PRESSURE: 152 MMHG | WEIGHT: 152 LBS | HEART RATE: 85 BPM | OXYGEN SATURATION: 95 % | TEMPERATURE: 97.7 F

## 2021-05-27 DIAGNOSIS — Z91.89 AT RISK FOR POLYPHARMACY: ICD-10-CM

## 2021-05-27 DIAGNOSIS — K59.00 CONSTIPATION, UNSPECIFIED CONSTIPATION TYPE: ICD-10-CM

## 2021-05-27 DIAGNOSIS — Z09 HOSPITAL DISCHARGE FOLLOW-UP: Primary | ICD-10-CM

## 2021-05-27 PROCEDURE — 99213 OFFICE O/P EST LOW 20 MIN: CPT | Performed by: PHYSICIAN ASSISTANT

## 2021-05-27 RX ORDER — POLYETHYLENE GLYCOL 3350 17 G/17G
17 POWDER, FOR SOLUTION ORAL DAILY PRN
Qty: 510 G | Refills: 0 | Status: SHIPPED | OUTPATIENT
Start: 2021-05-27 | End: 2024-06-13

## 2021-05-27 RX ORDER — SENNOSIDES A AND B 8.6 MG/1
TABLET, FILM COATED ORAL
Qty: 90 TABLET | Refills: 11 | Status: SHIPPED | OUTPATIENT
Start: 2021-05-27 | End: 2022-06-17

## 2021-05-27 NOTE — PROGRESS NOTES
Assessment and Plan:     (Z09) Hospital discharge follow-up  (primary encounter diagnosis)  Comment: Has improved since discharge, no abdominal pain today, constipation has improved and eating  Plan: Advised her to try to get 6-8 glasses of water per day along with several servings of fresh fruit and vegetables, refilled miralax and senna    (K59.00) Constipation, unspecified constipation type  Comment: much improved, has been an ongoing issue for her  Plan: senna (SM SENNA LAXATIVE) 8.6 MG tablet,         polyethylene glycol (MIRALAX) 17 GM/Dose powder        See above    (Z91.89) At risk for polypharmacy  Comment: she is on a long list of meds and I suspect she could eliminate at least a few  Plan: MTM consult ordered, she and daughter will plan to attend visit with , they will confirm that she is taking the medications on the list    She will make an appt for a physical this November.      Ai Agosto PA-C        Janice Hsu is a 89 year old who presents for the following health issues: hospital follow-up.    She is accompanied by her daughter and an .    Patient states she was admitted with abd pain, nausea/vomiting, constipation, which have resolved     She feels well overall, no new issues since discharge    Still feels weak but improving every day, able to eat    No further bleeding per rectum    Last BM this am, normal      HPI       Hospital Follow-up Visit:    Hospital/Nursing Home/IP Rehab Facility: [facility]  Date of Admission: 5/21/2021  Date of Discharge: 5/23/2021  Reason(s) for Admission:     Abdominal pain and vomiting most likely due to constipation resolved  Chronic constipation   Hypovolemic Hyponatremia  Depression  GERD  Essential HTN      Was your hospitalization related to COVID-19? No   Problems taking medications regularly:  None  Medication changes since discharge: None  Problems adhering to non-medication therapy:   None    Summary of hospitalization:  [facility] discharge summary reviewed  Diagnostic Tests/Treatments reviewed.  Follow up needed: none  Other Healthcare Providers Involved in Patient s Care:         Specialist appointment - GI  Update since discharge: improved.       Post Discharge Medication Reconciliation: discharge medications reconciled, continue medications without change.  Plan of care communicated with patient and daughter/.                  Review of Systems         Objective    BP (!) 152/89 (BP Location: Left arm, Patient Position: Sitting, Cuff Size: Adult Regular)   Pulse 85   Temp 97.7  F (36.5  C) (Temporal)   Wt 68.9 kg (152 lb)   SpO2 95%   Breastfeeding No   BMI 31.77 kg/m    Body mass index is 31.77 kg/m .  Repeat blood pressure: 138/76  EXAM:  GENERAL APPEARANCE: healthy, alert and no distress  EYES: Eyes grossly normal to inspection, conjunctivae and sclerae normal  HENT:  mouth without ulcers or lesions  RESP: lungs clear to auscultation - no rales, rhonchi or wheezes  CV: regular rates and rhythm, normal S1 S2, no S3 or S4 and no murmur, click or rub  ABDOMEN: soft, nontender, without hepatosplenomegaly or masses and bowel sounds normal  NEURO: speech is normal  PSYCH: mentation appears normal and affect normal/bright

## 2021-05-27 NOTE — PATIENT INSTRUCTIONS
Drink 6-8 glasses of water per day.  Eat plenty of fruits and vegetables.    Scheduling will call to set up a virtual visit with a pharmacist.

## 2021-06-01 ENCOUNTER — TELEPHONE (OUTPATIENT)
Dept: FAMILY MEDICINE | Facility: CLINIC | Age: 86
End: 2021-06-01

## 2021-06-01 NOTE — TELEPHONE ENCOUNTER
MTM referral from: [facility] visit (referral by provider)    MTM referral outreach attempt #2 on June 1, 2021 at 4:48 PM      Outcome: Patient not reachable after several attempts, will route to MTM Pharmacist/Provider as an FYI. Thank you for the referral.    Miguelito Sim, MTM coordinator

## 2021-06-02 NOTE — TELEPHONE ENCOUNTER
Sent Eugenio joynerg.    Kendra Lang, PharmD, [facility]  Medication Therapy Management Provider  Pager: [phone]

## 2021-06-18 DIAGNOSIS — M81.0 OSTEOPOROSIS WITHOUT CURRENT PATHOLOGICAL FRACTURE, UNSPECIFIED OSTEOPOROSIS TYPE: ICD-10-CM

## 2021-06-21 DIAGNOSIS — K21.9 GASTROESOPHAGEAL REFLUX DISEASE WITHOUT ESOPHAGITIS: ICD-10-CM

## 2021-06-21 NOTE — TELEPHONE ENCOUNTER
Routing refill request to provider for review/approval because:  No diagnosis of osteoporosis on record    Matt STOREY RN

## 2021-06-24 DIAGNOSIS — I10 HYPERTENSION, UNSPECIFIED TYPE: ICD-10-CM

## 2021-06-24 DIAGNOSIS — M81.0 OSTEOPOROSIS WITHOUT CURRENT PATHOLOGICAL FRACTURE, UNSPECIFIED OSTEOPOROSIS TYPE: ICD-10-CM

## 2021-06-24 RX ORDER — AMLODIPINE BESYLATE 2.5 MG/1
TABLET ORAL
Qty: 90 TABLET | Refills: 1 | Status: SHIPPED | OUTPATIENT
Start: 2021-06-24 | End: 2021-12-21

## 2021-06-24 RX ORDER — ASPIRIN 81 MG/1
TABLET, COATED ORAL
Qty: 90 TABLET | Refills: 1 | Status: SHIPPED | OUTPATIENT
Start: 2021-06-24 | End: 2021-12-21

## 2021-06-24 RX ORDER — MULTIVITAMIN WITH FOLIC ACID 400 MCG
TABLET ORAL
Qty: 90 TABLET | Refills: 1 | Status: SHIPPED | OUTPATIENT
Start: 2021-06-24 | End: 2021-12-27

## 2021-09-26 ENCOUNTER — HEALTH MAINTENANCE LETTER (OUTPATIENT)
Age: 86
End: 2021-09-26

## 2021-10-14 ENCOUNTER — VIRTUAL VISIT (OUTPATIENT)
Dept: FAMILY MEDICINE | Facility: CLINIC | Age: 86
End: 2021-10-14
Payer: COMMERCIAL

## 2021-10-14 DIAGNOSIS — S32.040D CLOSED COMPRESSION FRACTURE OF L4 LUMBAR VERTEBRA WITH ROUTINE HEALING, SUBSEQUENT ENCOUNTER: ICD-10-CM

## 2021-10-14 PROCEDURE — 99213 OFFICE O/P EST LOW 20 MIN: CPT | Mod: 95 | Performed by: INTERNAL MEDICINE

## 2021-10-14 RX ORDER — GABAPENTIN 100 MG/1
100 CAPSULE ORAL
Qty: 60 CAPSULE | Refills: 1 | Status: SHIPPED | OUTPATIENT
Start: 2021-10-14 | End: 2024-06-27

## 2021-10-14 NOTE — PROGRESS NOTES
"Pt did not  the walker order available at the       Aracelis is a 89 year old who is being evaluated via a billable telephone visit.      What phone number would you like to be contacted at? [phone]  How would you like to obtain your AVS? MyChart    Assessment & Plan     Closed compression fracture of L4 lumbar vertebra with routine healing, subsequent encounter  Patient has been using topical.  She is having a bit more discomfort in the evening but also throughout the day.  She states her toes also are painful on both sides.  She has dystrophic nails notably.  She does have an appointment coming up for physical examination.  In the meanwhile I would recommend low-dose Neurontin once daily nocturnal in this 89-year-old female.    - diclofenac (VOLTAREN) 1 % topical gel; Apply 4 g topically 2 times daily  - gabapentin (NEURONTIN) 100 MG capsule; Take 1 capsule (100 mg) by mouth nightly as needed for neuropathic pain  - Walker Order for DME - ONLY FOR DME             BMI:   Estimated body mass index is 31.77 kg/m  as calculated from the following:    Height as of 4/14/21: 1.473 m (4' 10\").    Weight as of 5/27/21: 68.9 kg (152 lb).       See Patient Instructions    Return in about 6 weeks (around 11/25/2021).    Truman Gordon MD  [facility]   Aracelis is a 89 year old who presents for the following health issues     HPI 89-year-old who presents clinic today to obtain a new walker.  She would like a four-wheel walker with a seat.  Her current walker is poor and is a fall risk.    The patient also continues to have pain in and around the area of a compression fracture.  She would like a refill on her Voltaren.  She also has developed pain in her toes.  This is when she is upright or sleeping at night.  I mentioned to the patient a trial of Neurontin to help alleviate some of her discomfort.    She is scheduled to come in in 6 weeks for complete physical " examination.          Review of Systems   Constitutional, HEENT, cardiovascular, pulmonary, gi and gu systems are negative, except as otherwise noted.      Objective           Vitals:  No vitals were obtained today due to virtual visit.    Physical Exam   healthy, alert and no distress  PSYCH: Alert and oriented times 3; coherent speech, normal   rate and volume, able to articulate logical thoughts, able   to abstract reason, no tangential thoughts, no hallucinations   or delusions  Her affect is normal  RESP: No cough, no audible wheezing, able to talk in full sentences  Remainder of exam unable to be completed due to telephone visits    Admission on 05/21/2021, Discharged on 05/23/2021   Component Date Value Ref Range Status     WBC 05/21/2021 7.2  4.0 - 11.0 10e9/L Final     RBC Count 05/21/2021 4.58  3.8 - 5.2 10e12/L Final     Hemoglobin 05/21/2021 12.9  11.7 - 15.7 g/dL Final     Hematocrit 05/21/2021 38.1  35.0 - 47.0 % Final     MCV 05/21/2021 83  78 - 100 fl Final     MCH 05/21/2021 28.2  26.5 - 33.0 pg Final     MCHC 05/21/2021 33.9  31.5 - 36.5 g/dL Final     RDW 05/21/2021 11.2  10.0 - 15.0 % Final     Platelet Count 05/21/2021 194  150 - 450 10e9/L Final     Diff Method 05/21/2021 Automated Method   Final     % Neutrophils 05/21/2021 85.2  % Final     % Lymphocytes 05/21/2021 10.3  % Final     % Monocytes 05/21/2021 3.5  % Final     % Eosinophils 05/21/2021 0.3  % Final     % Basophils 05/21/2021 0.1  % Final     % Immature Granulocytes 05/21/2021 0.6  % Final     Nucleated RBCs 05/21/2021 0  0 /100 Final     Absolute Neutrophil 05/21/2021 6.1  1.6 - 8.3 10e9/L Final     Absolute Lymphocytes 05/21/2021 0.7* 0.8 - 5.3 10e9/L Final     Absolute Monocytes 05/21/2021 0.3  0.0 - 1.3 10e9/L Final     Absolute Eosinophils 05/21/2021 0.0  0.0 - 0.7 10e9/L Final     Absolute Basophils 05/21/2021 0.0  0.0 - 0.2 10e9/L Final     Abs Immature Granulocytes 05/21/2021 0.0  0 - 0.4 10e9/L Final     Absolute Nucleated  RBC 05/21/2021 0.0   Final     Sodium 05/21/2021 126* 133 - 144 mmol/L Final     Potassium 05/21/2021 3.7  3.4 - 5.3 mmol/L Final     Chloride 05/21/2021 93* 94 - 109 mmol/L Final     Carbon Dioxide 05/21/2021 26  20 - 32 mmol/L Final     Anion Gap 05/21/2021 7  3 - 14 mmol/L Final     Glucose 05/21/2021 149* 70 - 99 mg/dL Final     Urea Nitrogen 05/21/2021 10  7 - 30 mg/dL Final     Creatinine 05/21/2021 0.61  0.52 - 1.04 mg/dL Final     GFR Estimate 05/21/2021 75  >60 mL/min/[1.73_m2] Final    Comment: Non  GFR Calc  Starting 12/18/2018, serum creatinine based estimated GFR (eGFR) will be   calculated using the Chronic Kidney Disease Epidemiology Collaboration   (CKD-EPI) equation.       GFR Estimate If Black 05/21/2021 87  >60 mL/min/[1.73_m2] Final    Comment:  GFR Calc  Starting 12/18/2018, serum creatinine based estimated GFR (eGFR) will be   calculated using the Chronic Kidney Disease Epidemiology Collaboration   (CKD-EPI) equation.       Calcium 05/21/2021 8.8  8.5 - 10.1 mg/dL Final     Bilirubin Total 05/21/2021 1.0  0.2 - 1.3 mg/dL Final     Albumin 05/21/2021 3.8  3.4 - 5.0 g/dL Final     Protein Total 05/21/2021 7.3  6.8 - 8.8 g/dL Final     Alkaline Phosphatase 05/21/2021 63  40 - 150 U/L Final     ALT 05/21/2021 18  0 - 50 U/L Final     AST 05/21/2021 15  0 - 45 U/L Final     Lipase 05/21/2021 68* 73 - 393 U/L Final     Lactic Acid 05/21/2021 0.7  0.7 - 2.0 mmol/L Final     Interpretation ECG 05/21/2021 Click View Image link to view waveform and result   Final     SARS-CoV-2 Virus Specimen Source 05/21/2021 Nasopharyngeal   Final     SARS-CoV-2 PCR Result 05/21/2021 NEGATIVE   Final    SARS-CoV2 (COVID-19) RNA not detected, presumed negative.     SARS-CoV-2 PCR Comment 05/21/2021 (Note)   Final    Comment: Testing was performed using the eugene SARS-CoV-2 & Influenza A/B Assay on the   eugene My System.  This test should be ordered for the detection of SARS-COV-2 in  individuals who   meet SARS-CoV-2 clinical and/or epidemiological criteria. Test performance is   unknown in asymptomatic patients.  This test is for in vitro diagnostic use under the FDA EUA for laboratories   certified under CLIA to perform moderate and/or high complexity testing. This   test has not been FDA cleared or approved.  A negative test does not rule out the presence of PCR inhibitors in the   specimen or target RNA in concentration below the limit of detection for the   assay. The possibility of a false negative should be considered if the   patient's recent exposure or clinical presentation suggests COVID-19.  [facility] are certified under the Clinical Laboratory   Improvement Amendments of 1988 (CLIA-88) as qualified to perform moderate   and/or high complexity laboratory testing.       Sodium 05/22/2021 126* 133 - 144 mmol/L Final     Potassium 05/22/2021 3.8  3.4 - 5.3 mmol/L Final     Chloride 05/22/2021 95  94 - 109 mmol/L Final     Carbon Dioxide 05/22/2021 25  20 - 32 mmol/L Final     Anion Gap 05/22/2021 6  3 - 14 mmol/L Final     Glucose 05/22/2021 130* 70 - 99 mg/dL Final     Urea Nitrogen 05/22/2021 8  7 - 30 mg/dL Final     Creatinine 05/22/2021 0.54  0.52 - 1.04 mg/dL Final     GFR Estimate 05/22/2021 78  >60 mL/min/[1.73_m2] Final    Comment: Non  GFR Calc  Starting 12/18/2018, serum creatinine based estimated GFR (eGFR) will be   calculated using the Chronic Kidney Disease Epidemiology Collaboration   (CKD-EPI) equation.       GFR Estimate If Black 05/22/2021 >90  >60 mL/min/[1.73_m2] Final    Comment:  GFR Calc  Starting 12/18/2018, serum creatinine based estimated GFR (eGFR) will be   calculated using the Chronic Kidney Disease Epidemiology Collaboration   (CKD-EPI) equation.       Calcium 05/22/2021 8.5  8.5 - 10.1 mg/dL Final     WBC 05/22/2021 8.3  4.0 - 11.0 10e9/L Final     RBC Count 05/22/2021 4.66  3.8 - 5.2 10e12/L Final      Hemoglobin 05/22/2021 12.9  11.7 - 15.7 g/dL Final     Hematocrit 05/22/2021 38.3  35.0 - 47.0 % Final     MCV 05/22/2021 82  78 - 100 fl Final     MCH 05/22/2021 27.7  26.5 - 33.0 pg Final     MCHC 05/22/2021 33.7  31.5 - 36.5 g/dL Final     RDW 05/22/2021 11.2  10.0 - 15.0 % Final     Platelet Count 05/22/2021 205  150 - 450 10e9/L Final     Sodium 05/22/2021 133  133 - 144 mmol/L Final     Sodium 05/22/2021 135  133 - 144 mmol/L Final     Lactate for Sepsis Protocol 05/22/2021 0.7  0.7 - 2.0 mmol/L Final     Sodium 05/22/2021 136  133 - 144 mmol/L Final     Sodium 05/23/2021 134  133 - 144 mmol/L Final               Phone call duration: 10 minutes

## 2021-10-15 DIAGNOSIS — S32.040D CLOSED COMPRESSION FRACTURE OF L4 LUMBAR VERTEBRA WITH ROUTINE HEALING, SUBSEQUENT ENCOUNTER: ICD-10-CM

## 2021-11-12 ENCOUNTER — PATIENT OUTREACH (OUTPATIENT)
Dept: GERIATRIC MEDICINE | Facility: CLINIC | Age: 86
End: 2021-11-12
Payer: COMMERCIAL

## 2021-11-12 DIAGNOSIS — Z46.89 WHEELCHAIR FITTING OR ADJUSTMENT: Primary | ICD-10-CM

## 2021-11-12 NOTE — PROGRESS NOTES
[facility] Care Coordination Contact    [facility] Six-Month Telephone Assessment    6 month telephone assessment completed on 11/12/2021.    ER visits: Yes -  [facility]  Hospitalizations: Yes -  [facility]  TCU stays: No  Significant health status changes: None reported  Falls/Injuries: No  ADL/IADL changes: No  Changes in services: Yes: Requesting a wheel chair. Sent a message to PCP requesting a referral to  Seated and Mobility if PCP is in agreement. Daughter reported that [place] walker wheels are broken. PCP signed an order in October for a new walker, but since she ordered a walker in 2019 it is not covered by insurance. Offered to confirm that walker wasn't covered by insurance and offered EW to help pay for the walker by completing a new assessment. Daughter declined EW or to confirm that walker wasn't covered by insurance. Daughter stated that she would purchase a new walker privately and is more concerned about ordering a wheel chair.    Caregiver Assessment follow up: Completed with daughter Yoselin    Goals: See POC in chart for goal progress documentation.      Will see member in 6 months for an annual health risk assessment.   Encouraged member to call CC with any questions or concerns in the meantime.     Flora Luis, MARTA  [facility]  [phone]  Fax: [phone]

## 2021-12-08 ENCOUNTER — OFFICE VISIT (OUTPATIENT)
Dept: URGENT CARE | Facility: URGENT CARE | Age: 86
End: 2021-12-08
Payer: COMMERCIAL

## 2021-12-08 ENCOUNTER — ANCILLARY PROCEDURE (OUTPATIENT)
Dept: GENERAL RADIOLOGY | Facility: CLINIC | Age: 86
End: 2021-12-08
Attending: FAMILY MEDICINE
Payer: COMMERCIAL

## 2021-12-08 VITALS
SYSTOLIC BLOOD PRESSURE: 154 MMHG | DIASTOLIC BLOOD PRESSURE: 77 MMHG | TEMPERATURE: 98 F | HEART RATE: 97 BPM | OXYGEN SATURATION: 93 %

## 2021-12-08 DIAGNOSIS — S99.922A TOE INJURY, LEFT, INITIAL ENCOUNTER: Primary | ICD-10-CM

## 2021-12-08 PROCEDURE — 99203 OFFICE O/P NEW LOW 30 MIN: CPT

## 2021-12-08 PROCEDURE — 73660 X-RAY EXAM OF TOE(S): CPT | Mod: LT | Performed by: RADIOLOGY

## 2021-12-09 NOTE — PROGRESS NOTES
SUBJECTIVE:  CHIEF COMPLAINT: painful great toe both feet.  Lt worse that rt.    OBJECTIVE:  EXAM:  Patient appears alert in no apparent distress distress.  VITALS: Blood pressure (!) 154/77, pulse 97, temperature 98  F (36.7  C), temperature source Oral, SpO2 93 %, not currently breastfeeding.  Very pleasant lady that does not appear to be 89 yo  Rt great toe is normal in appearance but slightly tender.  Patient is wearing fairly narrow shoes tonight.( relative states she has some shoes with wider toe box)  Lt great toe is raised and irregular with apparent fungal involvement.  Some ecchymosis noted on lateral side. I trimmed this with toenail cutter.  Xray toe is negative.  ASSESSMENT:  Painful great toe  Toe nail fungus.    PLAN:  Suggested avoid narrow toed shoes.  I would not recommend oral treatment of toenail fungus.  Podiatry referral.

## 2021-12-15 ENCOUNTER — HOSPITAL ENCOUNTER (OUTPATIENT)
Dept: OCCUPATIONAL THERAPY | Facility: CLINIC | Age: 86
Setting detail: THERAPIES SERIES
End: 2021-12-15
Attending: INTERNAL MEDICINE
Payer: COMMERCIAL

## 2021-12-15 DIAGNOSIS — Z46.89 WHEELCHAIR FITTING OR ADJUSTMENT: ICD-10-CM

## 2021-12-15 PROCEDURE — 97542 WHEELCHAIR MNGMENT TRAINING: CPT | Mod: GO | Performed by: OCCUPATIONAL THERAPIST

## 2021-12-15 NOTE — PROGRESS NOTES
"   SEATING AND WHEELED MOBILITY ASSESSMENT  12/15/21 0800   Quick Adds   Quick Adds Certification       Present Yes   Language Other  (Farsi)    Comments No  available, JOSE and daughter assist with interpreting per patient   General Information    Rehab Discipline OT   [facility]   Service Outpatient;Occupational Therapy;Seating/Wheeled Mobility Evaluation   Height 4'10\"   Weight 152   Start Of Care Date 12/15/21   Referring Physician Truman Gordon   Orders Evaluate And Treat As Indicated;Per Therapist Evaluation   Orders Date 11/05/21   Others Present at Evaluation Son in law and daughter   Patient/Caregiver Goals Wheelchair   Rehabilitation Technology Supplier Sean DIAMOND from [employer]   Current Community Support Family/Friend Caregiver;Personal Care Attendant  (11 hrs PCA/day, )   Patient role/Employment history Disabled   Fall Risk Screen   Fall screen completed by OT   Have you fallen 2 or more times in the past year? No   Have you fallen and had an injury in the past year? No   Is patient a fall risk? Yes   Medical History   Onset Of Illness/injury Or Date Of Surgery 11/5/21  (order)   Medical Diagnosis L4 compression fracture   Medical History osteoporosis   Home Accessibility   Living Environment Apartment/condo   Primary Entrance Level;Elevator   All Rooms Wheelchair Accessible Yes   Community ADL   Transportation Car  (via family)   Community Mobility Requirements Medical Appointments;Shopping   Cognitive/Visual/Hearing Status   Observations No Problems Observed During Evaluation   ADL Status   Feeding Independent   Grooming/Hygiene Requires Assist   Dressing Requires Assist   Toileting Requires Assist;Uses Equipment  (grab bars)   Bathing Requires Assist;Uses Equipment  (shower chair)   Meal Preparation Unable   Home Management Unable   Balance   Unsupported Sitting Balance Uses Upper Extremities for Balance   Sitting Balance in Chair Uses Upper " Extremities for Balance   Standing Balance Uses Upper Extremities for Balance;Physical Assist Required   Ambulation   Ambulation Ambulatory   Ambulation Assist Requires Assist   Ambulation Equipment 2 Wheeled Walker;Cane   Transfers   Transfer Assist Moderate Assist   Sleep/Rest   Sleep Surface/Equipment Standard bed- very low with rail   Neuromuscular   Pain Yes   Pain Location in hands   Head and Neck   Head and Neck Position Functional   Head Control Good   Upper Extremities   UE ROM WFL with tight end ranges   UE Strength 4/5   Pelvis   Anterior/Posterior Pelvis Position Posterior Tilt   Trunk   Anterior/Posterior Trunk Position Increased Thoracic Kyphosis   Lower Extremities   LE ROM WFl   LE Strength 4-/5   Patient Measurements   Hip to Hip (inches) 16   Other per atp notes   Education Assessment   Barriers to Learning Physical   Preferred Learning Style Listening;Demonstration   Assessment/Plan   Criteria for Skilled Interventions Met Yes, Treatment Indicated   Treatment Diagnosis Impaired participaiton in MRADLs   Therapy Frequency once   Planned Therapy Interventions Wheelchair Management/Propulsion Training   Planned Therapy Interventions Comments Educated on types of manual chair to best meet needs, patient requires assistance with safe mobility in and out of her home.   Risks and benefits of treatment have been explained Yes   Patient/family & other staff in agreement with plan of care Yes   Comments Specs and LMN completed with ATP and patient   Session Time   OT Wheelchair Management Minutes (21416) 40   Certification   Certification date from 12/15/21   Certification date to 12/15/21   Adult OT Eval Goals   OT Eval Goals (Adult) 1    OT Goal 1   Goal Identifier wheelchair   Goal Description Patient to demonstrate a successful clinical trial of the recommended wheelchair   Goal Progress met   Target Date 12/15/21   Date Met 12/15/21   Coverage Criteria Per Local Coverage Determination    WENDI watts  mobility limitations due to L4 compression fracture and osteoarthritis that significantly impairs her ability to participate in all of her mobility-related activities of daily living (MRADL). Specifically affected are toileting (being able to get there in time to prevent accidents), dressing, and bathing (getting into the bathroom of designated place). Current equipment used is walker and SEC with assistance. This patient needs the new equipment requested to be able to increase safe and independent participation in MRADLs and IADLs. Please see additional documentation in the seating and wheeled mobility report for details.   Reddy had a successful clinical trial here with the recommended equipment. She is very willing and physically / cognitively able to use the recommended equipment to assist her with mobility related activities of daily living and general mobility.     B) Reddy's mobility limitation cannot be sufficiently and safely resolved by the use of an appropriately fitted cane or walker because she requires assistance with all mobility for safety due to weakness and instability. Strength of legs is 4-/5 for one maximal repetition. Fatigue also  impacts this patient's ability to ambulate, regardless of the gait aid.    C) Reddy does not have sufficient upper extremity function to self-propel a K1-3 chair and requires a K4 optimally-configured manual wheelchair in her home to perform MRADLs during a typical day due to limitations in strength, endurance, range of motion, and coordination. Strength of arms is 4/5.    D) The need for this equipment is LIFETIME.     RECOMMENDATIONS FOR MOBILITY BASE, SEATING SYSTEM AND COMPONENTS  Ki Mobility K4 lightweight manual wheelchair - this light weight manual wheelchair is appropriate and necessary for her to be able to assist and complete all of her MRADLs within her residence. Reddy has mobility limitations impacting her ability to ambulate independently or  with any ambulation aid. She has had a thorough clinical evaluation, and this manual wheelchair is the best option for this patient.  Any less costly wheelchair option would be unable to accommodate anterior-posterior axle adjustments to maximize propulsion mechanics required for shoulder preservation in full-time, active manual wheelchair propeller.      17x17 seat size- needed to fit this patient    Poly casters - for smooth ride not to jar/shake her    Composite angle footrests - for leg support    Heel loops- prevents rearward movement of Le    Angle adjustable push handles- two handles mounted to the backrest frame to allow the wheelchair to be pushed by a caregiver when the wheelchair user is unable to move the wheelchair. Adjustable in order to allow many different caregivers to assist     Extension handle for wheel locks- allows for independence use of brakes for safety with chair use.    2 post, flip back, height adjustable, removable, full length armrests - needed for arm support at appropriate height, not available with standard armrests    Rear anti-tip tubes - for safety to prevent w/c tipping rearward    Pelvic positioning strap - to assist maintaining pelvis position for functional activities    Axion G seat cushion - this pressure distribution and positioning seat cushion will optimally  distribute seating pressures to prevent pressure ulcers, but also provide a stable base of support for her to use during MRADLs.    Tension adjustable padded back support - contoured back support is needed to support Reddy's thoracolumbar area in an upright and midline position, with appropriate support pads as needed. This back support is essential to provide sufficient posterior support to maximize her postural alignment and minimize her tendency to develop scoliosis and other secondary complications.    This equipment is reasonable and necessary with reference to accepted standards of medical practice and  treatment of this patient's condition and is not being recommended as a convenience item. Without this recommended equipment, she is highly likely to sustain injuries from falls, develop pressure sores or postural compensation, and/or be bed confined, which those costs far exceed the cost of the requested equipment.    Electronically signed by:  Danuta PERES ATP      Occupational Therapist, Assistive   [phone]      fax: [phone]      [email or web address] [address]  December 15, 2021    I have read and concur with the above recommendations.    Physician Printed Name __________________________________________    Physician SIgnature  _____________________________________________    Date of SIgnature ______________________________    Physician Phone  ______________________________

## 2021-12-18 DIAGNOSIS — I10 HYPERTENSION, UNSPECIFIED TYPE: ICD-10-CM

## 2021-12-21 RX ORDER — ASPIRIN 81 MG/1
TABLET, COATED ORAL
Qty: 90 TABLET | Refills: 1 | Status: SHIPPED | OUTPATIENT
Start: 2021-12-21 | End: 2022-06-17

## 2021-12-21 RX ORDER — AMLODIPINE BESYLATE 2.5 MG/1
TABLET ORAL
Qty: 90 TABLET | Refills: 1 | Status: SHIPPED | OUTPATIENT
Start: 2021-12-21 | End: 2022-06-17

## 2021-12-21 NOTE — TELEPHONE ENCOUNTER
Routing refill request to provider for review/approval because:  BP Readings from Last 3 Encounters:   12/08/21 (!) 154/77   05/27/21 (!) 152/89   05/23/21 127/59     BP high

## 2021-12-24 DIAGNOSIS — M81.0 OSTEOPOROSIS WITHOUT CURRENT PATHOLOGICAL FRACTURE, UNSPECIFIED OSTEOPOROSIS TYPE: ICD-10-CM

## 2021-12-24 NOTE — TELEPHONE ENCOUNTER
New PCP is Truman Gordon at [facility].    Pt has future appt with him on:    Appointments in Next Year    Feb 16, 2022  9:30 AM  (Arrive by 9:10 AM)  Provider Visit with Truman Gordon MD  [facility] ([facility] ) [phone]        Medication is being filled for 1 time refill only due to:  appt scheduled with new PCP Dr. Gordon on 2/16/2022.

## 2021-12-27 DIAGNOSIS — M81.0 OSTEOPOROSIS WITHOUT CURRENT PATHOLOGICAL FRACTURE, UNSPECIFIED OSTEOPOROSIS TYPE: ICD-10-CM

## 2021-12-29 RX ORDER — MULTIVITAMIN WITH FOLIC ACID 400 MCG
1 TABLET ORAL DAILY
Qty: 90 TABLET | Refills: 1 | Status: SHIPPED | OUTPATIENT
Start: 2021-12-29 | End: 2022-06-17

## 2022-01-16 ENCOUNTER — HEALTH MAINTENANCE LETTER (OUTPATIENT)
Age: 87
End: 2022-01-16

## 2022-02-02 ENCOUNTER — MEDICAL CORRESPONDENCE (OUTPATIENT)
Dept: HEALTH INFORMATION MANAGEMENT | Facility: CLINIC | Age: 87
End: 2022-02-02
Payer: COMMERCIAL

## 2022-02-04 ENCOUNTER — MEDICAL CORRESPONDENCE (OUTPATIENT)
Dept: HEALTH INFORMATION MANAGEMENT | Facility: CLINIC | Age: 87
End: 2022-02-04
Payer: COMMERCIAL

## 2022-02-16 ENCOUNTER — OFFICE VISIT (OUTPATIENT)
Dept: FAMILY MEDICINE | Facility: CLINIC | Age: 87
End: 2022-02-16
Payer: COMMERCIAL

## 2022-02-16 VITALS
WEIGHT: 150 LBS | DIASTOLIC BLOOD PRESSURE: 87 MMHG | BODY MASS INDEX: 31.49 KG/M2 | TEMPERATURE: 97.3 F | SYSTOLIC BLOOD PRESSURE: 143 MMHG | RESPIRATION RATE: 21 BRPM | HEIGHT: 58 IN | HEART RATE: 80 BPM | OXYGEN SATURATION: 94 %

## 2022-02-16 DIAGNOSIS — H90.3 BILATERAL SENSORINEURAL HEARING LOSS: ICD-10-CM

## 2022-02-16 DIAGNOSIS — R53.83 OTHER FATIGUE: Primary | ICD-10-CM

## 2022-02-16 LAB
ALBUMIN UR-MCNC: 30 MG/DL
ANION GAP SERPL CALCULATED.3IONS-SCNC: 6 MMOL/L (ref 3–14)
APPEARANCE UR: CLEAR
BACTERIA #/AREA URNS HPF: ABNORMAL /HPF
BASOPHILS # BLD AUTO: 0 10E3/UL (ref 0–0.2)
BASOPHILS NFR BLD AUTO: 0 %
BILIRUB UR QL STRIP: NEGATIVE
BUN SERPL-MCNC: 17 MG/DL (ref 7–30)
CALCIUM SERPL-MCNC: 9.6 MG/DL (ref 8.5–10.1)
CHLORIDE BLD-SCNC: 102 MMOL/L (ref 94–109)
CO2 SERPL-SCNC: 28 MMOL/L (ref 20–32)
COLOR UR AUTO: YELLOW
CREAT SERPL-MCNC: 0.76 MG/DL (ref 0.52–1.04)
EOSINOPHIL # BLD AUTO: 0.3 10E3/UL (ref 0–0.7)
EOSINOPHIL NFR BLD AUTO: 4 %
ERYTHROCYTE [DISTWIDTH] IN BLOOD BY AUTOMATED COUNT: 12.3 % (ref 10–15)
FOLATE SERPL-MCNC: 88.5 NG/ML
GFR SERPL CREATININE-BSD FRML MDRD: 70 ML/MIN/1.73M2
GLUCOSE BLD-MCNC: 96 MG/DL (ref 70–99)
GLUCOSE UR STRIP-MCNC: NEGATIVE MG/DL
HCT VFR BLD AUTO: 43.1 % (ref 35–47)
HGB BLD-MCNC: 13.7 G/DL (ref 11.7–15.7)
HGB UR QL STRIP: ABNORMAL
KETONES UR STRIP-MCNC: ABNORMAL MG/DL
LEUKOCYTE ESTERASE UR QL STRIP: ABNORMAL
LYMPHOCYTES # BLD AUTO: 1.6 10E3/UL (ref 0.8–5.3)
LYMPHOCYTES NFR BLD AUTO: 27 %
MCH RBC QN AUTO: 28 PG (ref 26.5–33)
MCHC RBC AUTO-ENTMCNC: 31.8 G/DL (ref 31.5–36.5)
MCV RBC AUTO: 88 FL (ref 78–100)
MONOCYTES # BLD AUTO: 0.7 10E3/UL (ref 0–1.3)
MONOCYTES NFR BLD AUTO: 11 %
NEUTROPHILS # BLD AUTO: 3.3 10E3/UL (ref 1.6–8.3)
NEUTROPHILS NFR BLD AUTO: 57 %
NITRATE UR QL: NEGATIVE
PH UR STRIP: 7 [PH] (ref 5–7)
PLATELET # BLD AUTO: 198 10E3/UL (ref 150–450)
POTASSIUM BLD-SCNC: 4.7 MMOL/L (ref 3.4–5.3)
RBC # BLD AUTO: 4.89 10E6/UL (ref 3.8–5.2)
RBC #/AREA URNS AUTO: ABNORMAL /HPF
SODIUM SERPL-SCNC: 136 MMOL/L (ref 133–144)
SP GR UR STRIP: 1.02 (ref 1–1.03)
SQUAMOUS #/AREA URNS AUTO: ABNORMAL /LPF
TSH SERPL DL<=0.005 MIU/L-ACNC: 2.38 MU/L (ref 0.4–4)
UROBILINOGEN UR STRIP-ACNC: 0.2 E.U./DL
VIT B12 SERPL-MCNC: 1386 PG/ML (ref 193–986)
WBC # BLD AUTO: 5.8 10E3/UL (ref 4–11)
WBC #/AREA URNS AUTO: ABNORMAL /HPF

## 2022-02-16 PROCEDURE — 84443 ASSAY THYROID STIM HORMONE: CPT | Performed by: INTERNAL MEDICINE

## 2022-02-16 PROCEDURE — 81001 URINALYSIS AUTO W/SCOPE: CPT | Performed by: INTERNAL MEDICINE

## 2022-02-16 PROCEDURE — 36415 COLL VENOUS BLD VENIPUNCTURE: CPT | Performed by: INTERNAL MEDICINE

## 2022-02-16 PROCEDURE — 82746 ASSAY OF FOLIC ACID SERUM: CPT | Performed by: INTERNAL MEDICINE

## 2022-02-16 PROCEDURE — 82607 VITAMIN B-12: CPT | Performed by: INTERNAL MEDICINE

## 2022-02-16 PROCEDURE — 85025 COMPLETE CBC W/AUTO DIFF WBC: CPT | Performed by: INTERNAL MEDICINE

## 2022-02-16 PROCEDURE — 80048 BASIC METABOLIC PNL TOTAL CA: CPT | Performed by: INTERNAL MEDICINE

## 2022-02-16 PROCEDURE — 99213 OFFICE O/P EST LOW 20 MIN: CPT | Performed by: INTERNAL MEDICINE

## 2022-02-16 ASSESSMENT — PATIENT HEALTH QUESTIONNAIRE - PHQ9: SUM OF ALL RESPONSES TO PHQ QUESTIONS 1-9: 16

## 2022-02-16 ASSESSMENT — PAIN SCALES - GENERAL: PAINLEVEL: NO PAIN (0)

## 2022-02-16 NOTE — LETTER
February 19, 2022      Aracelis Blakely  [address]   [place] [address]        Dear Ms.Rabeti Blakely,    Best Regards.    None of the recent labs (listed) point to a cause of increased fatigue.    I agree that isolation is the likely cause.    Resulted Orders   Vitamin B12   Result Value Ref Range    Vitamin B12 1,386 (H) 193 - 986 pg/mL   Folate   Result Value Ref Range    Folic Acid 88.5 >=5.4 ng/mL      Comment:      Deficient: <3.4 ng/mL  Indeterminate: 3.4-5.4 ng/mL  Normal: > 5.4 ng/mL   TSH with free T4 reflex   Result Value Ref Range    TSH 2.38 0.40 - 4.00 mU/L   Basic metabolic panel  (Ca, Cl, CO2, Creat, Gluc, K, Na, BUN)   Result Value Ref Range    Sodium 136 133 - 144 mmol/L    Potassium 4.7 3.4 - 5.3 mmol/L    Chloride 102 94 - 109 mmol/L    Carbon Dioxide (CO2) 28 20 - 32 mmol/L    Anion Gap 6 3 - 14 mmol/L    Urea Nitrogen 17 7 - 30 mg/dL    Creatinine 0.76 0.52 - 1.04 mg/dL    Calcium 9.6 8.5 - 10.1 mg/dL    Glucose 96 70 - 99 mg/dL    GFR Estimate 70 >60 mL/min/1.73m2      Comment:      Effective December 21, 2021 eGFRcr in adults is calculated using the 2021 CKD-EPI creatinine equation which includes age and gender (Fe fleming al., [place], DOI: 10.1056/HAZWtc6870860)   UA with Microscopic reflex to Culture - lab collect   Result Value Ref Range    Color Urine Yellow Colorless, Straw, Light Yellow, Yellow    Appearance Urine Clear Clear    Glucose Urine Negative Negative mg/dL    Bilirubin Urine Negative Negative    Ketones Urine Trace (A) Negative mg/dL    Specific Gravity Urine 1.020 1.003 - 1.035    Blood Urine Trace (A) Negative    pH Urine 7.0 5.0 - 7.0    Protein Albumin Urine 30  (A) Negative mg/dL    Urobilinogen Urine 0.2 0.2, 1.0 E.U./dL    Nitrite Urine Negative Negative    Leukocyte Esterase Urine Trace (A) Negative   CBC with platelets and differential   Result Value Ref Range    WBC Count 5.8 4.0 - 11.0 10e3/uL    RBC Count 4.89 3.80 - 5.20 10e6/uL    Hemoglobin 13.7  11.7 - 15.7 g/dL    Hematocrit 43.1 35.0 - 47.0 %    MCV 88 78 - 100 fL    MCH 28.0 26.5 - 33.0 pg    MCHC 31.8 31.5 - 36.5 g/dL    RDW 12.3 10.0 - 15.0 %    Platelet Count 198 150 - 450 10e3/uL    % Neutrophils 57 %    % Lymphocytes 27 %    % Monocytes 11 %    % Eosinophils 4 %    % Basophils 0 %    Absolute Neutrophils 3.3 1.6 - 8.3 10e3/uL    Absolute Lymphocytes 1.6 0.8 - 5.3 10e3/uL    Absolute Monocytes 0.7 0.0 - 1.3 10e3/uL    Absolute Eosinophils 0.3 0.0 - 0.7 10e3/uL    Absolute Basophils 0.0 0.0 - 0.2 10e3/uL   Urine Microscopic   Result Value Ref Range    Bacteria Urine Few (A) None Seen /HPF    RBC Urine 0-2 0-2 /HPF /HPF    WBC Urine 0-5 0-5 /HPF /HPF    Squamous Epithelials Urine Few (A) None Seen /LPF    Narrative    Urine Culture not indicated       If you have any questions or concerns, please call the clinic at the number listed above.       Sincerely,      Truman Gordon MD

## 2022-02-16 NOTE — PROGRESS NOTES
"  Assessment & Plan     Other fatigue  Fatigue likely multifactorial and perhaps involving mood.  The patient is having more difficulty with ambulation.  She is inside the home without getting out much at all.  Fortunately a custom wheelchair is being made for her which should be available in March.  This will help with ambulation outside the home.    Other factors which are significant include her hearing.  She is unable to wear her hearing aids.  One was painful to her second hearing aid that was constructed for her is falling out of her ear.  For this reason she cannot communicate with family members and again he has more isolated.    - CBC with platelets and differential; Future  - Vitamin B12; Future  - Folate; Future  - TSH with free T4 reflex; Future  - Basic metabolic panel  (Ca, Cl, CO2, Creat, Gluc, K, Na, BUN); Future  - UA with Microscopic reflex to Culture - lab collect; Future  - CBC with platelets and differential  - Vitamin B12  - Folate  - TSH with free T4 reflex  - Basic metabolic panel  (Ca, Cl, CO2, Creat, Gluc, K, Na, BUN)  - UA with Microscopic reflex to Culture - lab collect  - Urine Microscopic    Bilateral sensorineural hearing loss  As noted above.  - Otolaryngology Referral; Future             BMI:   Estimated body mass index is 31.35 kg/m  as calculated from the following:    Height as of this encounter: 1.473 m (4' 10\").    Weight as of this encounter: 68 kg (150 lb).       Depression Screening Follow Up    PHQ 2/13/2022   PHQ-9 Total Score 16   Q9: Thoughts of better off dead/self-harm past 2 weeks Not at all         Follow Up Actions Taken       See Patient Instructions    Return in about 6 months (around 8/16/2022) for Follow up.    Truman Gordon MD  [facility] [place] Hsu is a 89 year old who presents for the following health issues  accompanied by her spouse and daughter.  The patient is having more fatigue.  The patient's son-in-law states that " "she is feeling more isolated and with lower mood.  She is unable to get out of the house because she does not have a functional wheelchair.  Additionally, the patient is not able to communicate as well because she has poor hearing and is unable to communicate with her relatives effectively.  This combination has led to some overall sadness.    She denies any urinary symptoms.  Continues to have occasional radicular pain from the low back extending to the right lateral leg.  Medications have not been effective.  Steroid injections have not been effective and the patient does not wish another steroid injection at this point.  History of Present Illness       She eats 4 or more servings of fruits and vegetables daily.She consumes 3 sweetened beverage(s) daily. She exercises with enough effort to increase her heart rate 3 or less days per week.   She is taking medications regularly.             Review of Systems   Constitutional, HEENT, cardiovascular, pulmonary, gi and gu systems are negative, except as otherwise noted.      Objective    BP (!) 143/87 (BP Location: Left arm, Patient Position: Chair, Cuff Size: Adult Regular)   Pulse 80   Temp 97.3  F (36.3  C) (Temporal)   Resp 21   Ht 1.473 m (4' 10\")   Wt 68 kg (150 lb)   SpO2 94%   BMI 31.35 kg/m    Body mass index is 31.35 kg/m .  Physical Exam   Alert patient.  Difficulty with auditory acuity.  Has difficulty understanding the spoken word.    No lower extremity edema is noted    Her affect is flat mood slightly depressed compared to her previous visit    Admission on 05/21/2021, Discharged on 05/23/2021   Component Date Value Ref Range Status     WBC 05/21/2021 7.2  4.0 - 11.0 10e9/L Final     RBC Count 05/21/2021 4.58  3.8 - 5.2 10e12/L Final     Hemoglobin 05/21/2021 12.9  11.7 - 15.7 g/dL Final     Hematocrit 05/21/2021 38.1  35.0 - 47.0 % Final     MCV 05/21/2021 83  78 - 100 fl Final     MCH 05/21/2021 28.2  26.5 - 33.0 pg Final     MCHC 05/21/2021 " 33.9  31.5 - 36.5 g/dL Final     RDW 05/21/2021 11.2  10.0 - 15.0 % Final     Platelet Count 05/21/2021 194  150 - 450 10e9/L Final     Diff Method 05/21/2021 Automated Method   Final     % Neutrophils 05/21/2021 85.2  % Final     % Lymphocytes 05/21/2021 10.3  % Final     % Monocytes 05/21/2021 3.5  % Final     % Eosinophils 05/21/2021 0.3  % Final     % Basophils 05/21/2021 0.1  % Final     % Immature Granulocytes 05/21/2021 0.6  % Final     Nucleated RBCs 05/21/2021 0  0 /100 Final     Absolute Neutrophil 05/21/2021 6.1  1.6 - 8.3 10e9/L Final     Absolute Lymphocytes 05/21/2021 0.7 (A) 0.8 - 5.3 10e9/L Final     Absolute Monocytes 05/21/2021 0.3  0.0 - 1.3 10e9/L Final     Absolute Eosinophils 05/21/2021 0.0  0.0 - 0.7 10e9/L Final     Absolute Basophils 05/21/2021 0.0  0.0 - 0.2 10e9/L Final     Abs Immature Granulocytes 05/21/2021 0.0  0 - 0.4 10e9/L Final     Absolute Nucleated RBC 05/21/2021 0.0   Final     Sodium 05/21/2021 126 (A) 133 - 144 mmol/L Final     Potassium 05/21/2021 3.7  3.4 - 5.3 mmol/L Final     Chloride 05/21/2021 93 (A) 94 - 109 mmol/L Final     Carbon Dioxide 05/21/2021 26  20 - 32 mmol/L Final     Anion Gap 05/21/2021 7  3 - 14 mmol/L Final     Glucose 05/21/2021 149 (A) 70 - 99 mg/dL Final     Urea Nitrogen 05/21/2021 10  7 - 30 mg/dL Final     Creatinine 05/21/2021 0.61  0.52 - 1.04 mg/dL Final     GFR Estimate 05/21/2021 75  >60 mL/min/[1.73_m2] Final    Comment: Non  GFR Calc  Starting 12/18/2018, serum creatinine based estimated GFR (eGFR) will be   calculated using the Chronic Kidney Disease Epidemiology Collaboration   (CKD-EPI) equation.       GFR Estimate If Black 05/21/2021 87  >60 mL/min/[1.73_m2] Final    Comment:  GFR Calc  Starting 12/18/2018, serum creatinine based estimated GFR (eGFR) will be   calculated using the Chronic Kidney Disease Epidemiology Collaboration   (CKD-EPI) equation.       Calcium 05/21/2021 8.8  8.5 - 10.1 mg/dL Final      Bilirubin Total 05/21/2021 1.0  0.2 - 1.3 mg/dL Final     Albumin 05/21/2021 3.8  3.4 - 5.0 g/dL Final     Protein Total 05/21/2021 7.3  6.8 - 8.8 g/dL Final     Alkaline Phosphatase 05/21/2021 63  40 - 150 U/L Final     ALT 05/21/2021 18  0 - 50 U/L Final     AST 05/21/2021 15  0 - 45 U/L Final     Lipase 05/21/2021 68 (A) 73 - 393 U/L Final     Lactic Acid 05/21/2021 0.7  0.7 - 2.0 mmol/L Final     Interpretation ECG 05/21/2021 Click View Image link to view waveform and result   Final     SARS-CoV-2 Virus Specimen Source 05/21/2021 Nasopharyngeal   Final     SARS-CoV-2 PCR Result 05/21/2021 NEGATIVE   Final    SARS-CoV2 (COVID-19) RNA not detected, presumed negative.     SARS-CoV-2 PCR Comment 05/21/2021 (Note)   Final    Comment: Testing was performed using the eugene SARS-CoV-2 & Influenza A/B Assay on the   eugene My System.  This test should be ordered for the detection of SARS-COV-2 in individuals who   meet SARS-CoV-2 clinical and/or epidemiological criteria. Test performance is   unknown in asymptomatic patients.  This test is for in vitro diagnostic use under the FDA EUA for laboratories   certified under CLIA to perform moderate and/or high complexity testing. This   test has not been FDA cleared or approved.  A negative test does not rule out the presence of PCR inhibitors in the   specimen or target RNA in concentration below the limit of detection for the   assay. The possibility of a false negative should be considered if the   patient's recent exposure or clinical presentation suggests COVID-19.  [facility] are certified under the Clinical Laboratory   Improvement Amendments of 1988 (CLIA-88) as qualified to perform moderate   and/or high complexity laboratory testing.       Sodium 05/22/2021 126 (A) 133 - 144 mmol/L Final     Potassium 05/22/2021 3.8  3.4 - 5.3 mmol/L Final     Chloride 05/22/2021 95  94 - 109 mmol/L Final     Carbon Dioxide 05/22/2021 25  20 - 32 mmol/L Final      Anion Gap 05/22/2021 6  3 - 14 mmol/L Final     Glucose 05/22/2021 130 (A) 70 - 99 mg/dL Final     Urea Nitrogen 05/22/2021 8  7 - 30 mg/dL Final     Creatinine 05/22/2021 0.54  0.52 - 1.04 mg/dL Final     GFR Estimate 05/22/2021 78  >60 mL/min/[1.73_m2] Final    Comment: Non  GFR Calc  Starting 12/18/2018, serum creatinine based estimated GFR (eGFR) will be   calculated using the Chronic Kidney Disease Epidemiology Collaboration   (CKD-EPI) equation.       GFR Estimate If Black 05/22/2021 >90  >60 mL/min/[1.73_m2] Final    Comment:  GFR Calc  Starting 12/18/2018, serum creatinine based estimated GFR (eGFR) will be   calculated using the Chronic Kidney Disease Epidemiology Collaboration   (CKD-EPI) equation.       Calcium 05/22/2021 8.5  8.5 - 10.1 mg/dL Final     WBC 05/22/2021 8.3  4.0 - 11.0 10e9/L Final     RBC Count 05/22/2021 4.66  3.8 - 5.2 10e12/L Final     Hemoglobin 05/22/2021 12.9  11.7 - 15.7 g/dL Final     Hematocrit 05/22/2021 38.3  35.0 - 47.0 % Final     MCV 05/22/2021 82  78 - 100 fl Final     MCH 05/22/2021 27.7  26.5 - 33.0 pg Final     MCHC 05/22/2021 33.7  31.5 - 36.5 g/dL Final     RDW 05/22/2021 11.2  10.0 - 15.0 % Final     Platelet Count 05/22/2021 205  150 - 450 10e9/L Final     Sodium 05/22/2021 133  133 - 144 mmol/L Final     Sodium 05/22/2021 135  133 - 144 mmol/L Final     Lactate for Sepsis Protocol 05/22/2021 0.7  0.7 - 2.0 mmol/L Final     Sodium 05/22/2021 136  133 - 144 mmol/L Final     Sodium 05/23/2021 134  133 - 144 mmol/L Final

## 2022-02-16 NOTE — PATIENT INSTRUCTIONS
Aracelis;  I will refer you to a hearing center for obtaining hearing aids that are easy for you to wear.    Regarding your fatigue I would like to obtain a few lab tests today.    Best regards to you and your family.    Truman

## 2022-02-17 PROBLEM — Z76.89 HEALTH CARE HOME: Status: RESOLVED | Noted: 2018-04-12 | Resolved: 2021-04-13

## 2022-02-22 ENCOUNTER — PATIENT OUTREACH (OUTPATIENT)
Dept: GERIATRIC MEDICINE | Facility: CLINIC | Age: 87
End: 2022-02-22
Payer: COMMERCIAL

## 2022-02-22 NOTE — PROGRESS NOTES
[facility] Care Coordination Contact    Called adult daughter Yoselin to schedule annual HRA home visit. HRA has been scheduled for Tuesday, March 1st at 9:00am.     MARTA Lino  [facility]  [phone]  Fax: [phone]

## 2022-03-01 ENCOUNTER — PATIENT OUTREACH (OUTPATIENT)
Dept: GERIATRIC MEDICINE | Facility: CLINIC | Age: 87
End: 2022-03-01
Payer: COMMERCIAL

## 2022-03-01 NOTE — PROGRESS NOTES
[facility] Care Coordination Contact    [facility] Home Visit Assessment     Telephonic Health Risk Assessment with Aracelis Blakely completed on March 1, 2022    Type of residence:: Apartment - handicap accessible  Current living arrangement:: I live alone     Assessment completed with:: Patient, Other, Family ([facility] Language Line )    Current Care Plan  Member currently receiving the following home care services: NA    Member currently receiving the following community resources: DME, PCA, Transportation Services    Medication Review  Medication reconciliation completed in Epic: Started but didn't complete d/t confusion with .  Medication set-up & administration: Family/informal caregiver sets up weekly.  Family caregiver administers medications.  Medication Risk Assessment Medication (1 or more, place referral to MTM): N/A: No risk factors identified  MTM Referral Placed: No: No risk factors idenified    Mental/Behavioral Health   Depression Screening:   PHQ-2 Total Score (Adult) - Positive if 3 or more points; Administer PHQ-9 if positive: 0       Mental health DX:: Yes   Mental health DX how managed:: Medication    Falls Assessment:   Fallen 2 or more times in the past year?: No   Any fall with injury in the past year?: No    ADL/IADL Dependencies:   Dependent ADLs:: Bathing, Dressing, Eating, Grooming, Transfers, Wheelchair-with assist, Toileting, Incontinence  Dependent IADLs:: Cleaning, Cooking, Laundry, Shopping, Money Management, Medication Management, Incontinence, Meal Preparation, Transportation    [facility] sponsored benefits: Shared information re: Silver Sneakers/gym memberships, ASA, Calcium +D.    PCA Assessment completed at visit: Yes, PCA hours pending.    Elderly Waiver Eligibility: Meets criteria. Not open to EW.  Care Plan & Recommendations: No medical concerns at this time. Family is requesting lifeline. Will check with health plan to see if it  is covered otherwise will need to see if PCA hours and lifeline can fit within an EW budget. Family does not want to reduce PCA hours to open to EW if needed for lifeline.    See [facility] for detailed assessment information.    Follow-Up Plan: Member informed of future contact, plan to f/u with member with a 6 month telephone assessment.  Contact information shared with member and family, encouraged member to call with any questions or concerns at any time.    [place] care continuum providers: Please refer to Health Care Home on the Epic Problem List to view this patient's [facility] Care Plan Summary.    MARTA Lino  [facility]  [phone]  Fax: [phone]

## 2022-03-08 NOTE — PROGRESS NOTES
03/08/2022: PCA hours will remain the same. There is enough room in the EW budget to open [facility] to  to pay for lifeline. [facility] only pays for lifeline for people that do not qualify for EW.    Spoke w/ daughter Yoselin and completed 3543 form required to open her to EW. Requested for 3543 to be mailed to [facility] for a signature.    Updated daughter re: PCA hours.    Flora Luis, [facility]  [phone]  Fax: [phone]

## 2022-03-09 ENCOUNTER — PATIENT OUTREACH (OUTPATIENT)
Dept: GERIATRIC MEDICINE | Facility: CLINIC | Age: 87
End: 2022-03-09
Payer: COMMERCIAL

## 2022-03-09 NOTE — PROGRESS NOTES
[facility] Care Coordination Contact    [facility]:  Emailed completed PCA assessment to [facility].  Faxed copy of PCA assessment to PCA Agency and mailed copy to member.  Faxed MD Communication to PCP.     Iona Dhillon  Case Management Specialist  [facility]  [phone]

## 2022-03-09 NOTE — LETTER
[facility]  [address]  [place] [address]  Phone:  [phone]  Fax:  [phone]      March 9, 2022    KYLEE DIAZ  [address]   [place] [address]    Dear Kelly Hsu is a copy of your completed PCA Assessment and Service Plan.  This is for your records and no action is required by you.  If you have additional questions regarding your assessment please contact me at [phone]. If you feel that your needs are not being met, please contact the Clinical Supervisor at [phone].    Sincerely,    MARTA Lino    E-mail: [email or web address]  Phone: [phone]      [facility]              Enclosure:  Completed PCA assessment

## 2022-03-20 DIAGNOSIS — F32.A DEPRESSION, UNSPECIFIED DEPRESSION TYPE: ICD-10-CM

## 2022-03-23 NOTE — PROGRESS NOTES
03/23/2022: Received [phone] signature page. Faxed [phone], and [phone] signature page to [facility] requesting to open [facility] to . [facility] has 10 business days to process request.    Flora Luis, [facility]  [phone]  Fax: [phone]

## 2022-03-24 NOTE — TELEPHONE ENCOUNTER
Routing refill request to provider for review/approval because:  PHQ9 above 5    PHQ 8/20/2019 11/25/2020 2/13/2022   PHQ-9 Total Score 0 0 16   Q9: Thoughts of better off dead/self-harm past 2 weeks Not at all Not at all Not at all

## 2022-03-29 ENCOUNTER — PATIENT OUTREACH (OUTPATIENT)
Dept: GERIATRIC MEDICINE | Facility: CLINIC | Age: 87
End: 2022-03-29
Payer: COMMERCIAL

## 2022-03-29 NOTE — PROGRESS NOTES
[facility] Care Coordination Contact    Referral made to [facility] for a falls detection at home and away button.  Order placed on 03/29/2022.     MARTA Lino  [facility]  [phone]  Fax: [phone]

## 2022-03-29 NOTE — PROGRESS NOTES
[facility] Care Coordination Contact    Received after visit chart from care coordinator.  Completed following tasks: Mailed copy of care plan to client, Updated services in access and mailed POC sig sheet w/SASE.  Mailed medication disposal info.   and Provider Signature - No POC Shared:  Member indicates that they do not want their POC shared with any EW providers.     Iona Dhillon  Case Management Specialist  [facility]  [phone]

## 2022-04-05 NOTE — PROGRESS NOTES
[facility] Notification:    MEMBER MEMBER  PROVIDER TYPE OF SERVICE PROCEDURE CODE START DATE END DATE STATUS REASON UNITS AUTHORIZED COMMENTS   HUANELIASRAMBO GEMMA 3/21/1922 [employer] PCA: Assess Same PCA:  2022 Medically Necessary 6732 PCA Services 11 hrs per day; PCA QP Supervision If billing (modifier TG) and provider requirements are met services are eligible to be paid at the 7.5% enhanced rate.    KYLEE MENENDEZ 3/21/1922 [employer] PCA: Assess Same PCA:  10/1/2022 3/31/2023 Medically Necessary 8008 PCA Services 11 hrs per day; PCA QP Supervision If billing (modifier TG) and provider requirements are met services are eligible to be paid at the 7.5% enhanced rate.        Flora Luis [facility]  [phone]  Fax: [phone]

## 2022-06-17 DIAGNOSIS — K59.00 CONSTIPATION, UNSPECIFIED CONSTIPATION TYPE: ICD-10-CM

## 2022-06-17 DIAGNOSIS — I10 HYPERTENSION, UNSPECIFIED TYPE: ICD-10-CM

## 2022-06-17 DIAGNOSIS — M81.0 OSTEOPOROSIS WITHOUT CURRENT PATHOLOGICAL FRACTURE, UNSPECIFIED OSTEOPOROSIS TYPE: ICD-10-CM

## 2022-06-17 DIAGNOSIS — K21.9 GASTROESOPHAGEAL REFLUX DISEASE WITHOUT ESOPHAGITIS: ICD-10-CM

## 2022-06-17 RX ORDER — ASPIRIN 81 MG/1
TABLET, COATED ORAL
Qty: 90 TABLET | Refills: 1 | Status: SHIPPED | OUTPATIENT
Start: 2022-06-17 | End: 2022-12-06

## 2022-06-17 RX ORDER — SENNOSIDES A AND B 8.6 MG/1
TABLET, FILM COATED ORAL
Qty: 90 TABLET | Refills: 11 | Status: SHIPPED | OUTPATIENT
Start: 2022-06-17 | End: 2023-06-21

## 2022-06-17 RX ORDER — AMLODIPINE BESYLATE 2.5 MG/1
TABLET ORAL
Qty: 90 TABLET | Refills: 1 | Status: SHIPPED | OUTPATIENT
Start: 2022-06-17 | End: 2022-12-06

## 2022-06-17 RX ORDER — MULTIVITAMIN WITH FOLIC ACID 400 MCG
TABLET ORAL
Qty: 90 TABLET | Refills: 1 | Status: SHIPPED | OUTPATIENT
Start: 2022-06-17 | End: 2022-12-06

## 2022-06-17 NOTE — TELEPHONE ENCOUNTER
Vitamin D, Oscal and aspirin - Prescription approved per [facility] Refill Protocol.  Amlodipine - Routing refill request to provider for review/approval because:  BP Readings from Last 3 Encounters:   02/16/22 (!) 143/87   12/08/21 (!) 154/77   05/27/21 (!) 152/89     Omeprazole - osteoporosis  Senna - not on protocol.  Rosalinda Crews RN

## 2022-08-17 ENCOUNTER — OFFICE VISIT (OUTPATIENT)
Dept: FAMILY MEDICINE | Facility: CLINIC | Age: 87
End: 2022-08-17
Payer: COMMERCIAL

## 2022-08-17 VITALS
WEIGHT: 152.4 LBS | DIASTOLIC BLOOD PRESSURE: 82 MMHG | TEMPERATURE: 97.5 F | HEIGHT: 58 IN | OXYGEN SATURATION: 93 % | HEART RATE: 78 BPM | SYSTOLIC BLOOD PRESSURE: 144 MMHG | BODY MASS INDEX: 31.99 KG/M2 | RESPIRATION RATE: 16 BRPM

## 2022-08-17 DIAGNOSIS — S20.212A CONTUSION OF RIB ON LEFT SIDE, INITIAL ENCOUNTER: ICD-10-CM

## 2022-08-17 DIAGNOSIS — S32.040D CLOSED COMPRESSION FRACTURE OF L4 LUMBAR VERTEBRA WITH ROUTINE HEALING, SUBSEQUENT ENCOUNTER: ICD-10-CM

## 2022-08-17 DIAGNOSIS — B35.1 ONYCHOMYCOSIS: ICD-10-CM

## 2022-08-17 DIAGNOSIS — R60.0 LOCALIZED EDEMA: ICD-10-CM

## 2022-08-17 DIAGNOSIS — Z00.00 ENCOUNTER FOR PREVENTIVE HEALTH EXAMINATION: Primary | ICD-10-CM

## 2022-08-17 LAB
ANION GAP SERPL CALCULATED.3IONS-SCNC: 4 MMOL/L (ref 3–14)
BASOPHILS # BLD AUTO: 0 10E3/UL (ref 0–0.2)
BASOPHILS NFR BLD AUTO: 1 %
BUN SERPL-MCNC: 15 MG/DL (ref 7–30)
CALCIUM SERPL-MCNC: 9.5 MG/DL (ref 8.5–10.1)
CHLORIDE BLD-SCNC: 103 MMOL/L (ref 94–109)
CO2 SERPL-SCNC: 30 MMOL/L (ref 20–32)
CREAT SERPL-MCNC: 0.64 MG/DL (ref 0.52–1.04)
EOSINOPHIL # BLD AUTO: 0.3 10E3/UL (ref 0–0.7)
EOSINOPHIL NFR BLD AUTO: 6 %
ERYTHROCYTE [DISTWIDTH] IN BLOOD BY AUTOMATED COUNT: 11.8 % (ref 10–15)
GFR SERPL CREATININE-BSD FRML MDRD: 78 ML/MIN/1.73M2
GLUCOSE BLD-MCNC: 101 MG/DL (ref 70–99)
HCT VFR BLD AUTO: 41.1 % (ref 35–47)
HGB BLD-MCNC: 13.3 G/DL (ref 11.7–15.7)
IMM GRANULOCYTES # BLD: 0 10E3/UL
IMM GRANULOCYTES NFR BLD: 0 %
LYMPHOCYTES # BLD AUTO: 1.5 10E3/UL (ref 0.8–5.3)
LYMPHOCYTES NFR BLD AUTO: 29 %
MCH RBC QN AUTO: 28.4 PG (ref 26.5–33)
MCHC RBC AUTO-ENTMCNC: 32.4 G/DL (ref 31.5–36.5)
MCV RBC AUTO: 88 FL (ref 78–100)
MONOCYTES # BLD AUTO: 0.6 10E3/UL (ref 0–1.3)
MONOCYTES NFR BLD AUTO: 11 %
NEUTROPHILS # BLD AUTO: 2.8 10E3/UL (ref 1.6–8.3)
NEUTROPHILS NFR BLD AUTO: 54 %
PLATELET # BLD AUTO: 190 10E3/UL (ref 150–450)
POTASSIUM BLD-SCNC: 4.3 MMOL/L (ref 3.4–5.3)
RBC # BLD AUTO: 4.68 10E6/UL (ref 3.8–5.2)
SODIUM SERPL-SCNC: 137 MMOL/L (ref 133–144)
WBC # BLD AUTO: 5.2 10E3/UL (ref 4–11)

## 2022-08-17 PROCEDURE — 85025 COMPLETE CBC W/AUTO DIFF WBC: CPT | Performed by: INTERNAL MEDICINE

## 2022-08-17 PROCEDURE — 36415 COLL VENOUS BLD VENIPUNCTURE: CPT | Performed by: INTERNAL MEDICINE

## 2022-08-17 PROCEDURE — 80048 BASIC METABOLIC PNL TOTAL CA: CPT | Performed by: INTERNAL MEDICINE

## 2022-08-17 PROCEDURE — 99397 PER PM REEVAL EST PAT 65+ YR: CPT | Performed by: INTERNAL MEDICINE

## 2022-08-17 RX ORDER — LIDOCAINE 50 MG/G
1 PATCH TOPICAL EVERY 24 HOURS
Qty: 7 PATCH | Refills: 0 | Status: SHIPPED | OUTPATIENT
Start: 2022-08-17 | End: 2022-08-22

## 2022-08-17 RX ORDER — CICLOPIROX 80 MG/ML
SOLUTION TOPICAL
Qty: 6 ML | Refills: 0 | Status: SHIPPED | OUTPATIENT
Start: 2022-08-17 | End: 2022-09-15

## 2022-08-17 ASSESSMENT — PAIN SCALES - GENERAL: PAINLEVEL: EXTREME PAIN (8)

## 2022-08-17 NOTE — PATIENT INSTRUCTIONS
I have sent in a prescription for your toenails called [facility].  This should reduce the toenail fungus that you currently are struggling with.    I have ordered 4 pairs of support hose to reduce swelling.    I recommend the lidocaine patch be applied to your region of discomfort for 12 hours daily.    I have refilled your Voltaren gel.    Will obtain a few lab tests today.    Best regards  Truman

## 2022-08-17 NOTE — PROGRESS NOTES
12SUBJECTIVE:   Aracelis Blakely is a 89 year old female who presents for Preventive Visit.    Patient had a fall several weeks ago.  She fell on her left side.  Since this time she has had some tenderness particularly with taking a deep inspiration.  No fever.  No shortness of breath.  Has discomfort when she lies on her left side as well.    Denies any other significant symptoms.      Patient has been advised of split billing requirements and indicates understanding: Yes  Are you in the first 12 months of your Medicare coverage?  No    HPI  Do you feel safe in your environment? Yes    Have you ever done Advance Care Planning? (For example, a Health Directive, POLST, or a discussion with a medical provider or your loved ones about your wishes): Yes, advance care planning is on file.       Fall risk  Fallen 2 or more times in the past year?: No  Any fall with injury in the past year?: Yes    Cognitive Screening Unable to complete due to language barrier  Do you have sleep apnea, excessive snoring or daytime drowsiness?: no    Reviewed and updated as needed this visit by clinical staff                    Reviewed and updated as needed this visit by Provider                   Social History     Tobacco Use     Smoking status: Never Smoker     Smokeless tobacco: Never Used   Substance Use Topics     Alcohol use: No     If you drink alcohol do you typically have >3 drinks per day or >7 drinks per week? No    Alcohol Use 11/25/2020   Prescreen: >3 drinks/day or >7 drinks/week? No   Prescreen: >3 drinks/day or >7 drinks/week? -               Current providers sharing in care for this patient include:   Patient Care Team:  Truman Gordon MD as PCP - General (Internal Medicine)  Flora Luis LSW as Lead Care Coordinator (Primary Care - CC)  Truman Gordon MD as Assigned PCP  Kendra Lang, PharmD as Pharmacist (Pharmacist)    The following health maintenance items are reviewed in Epic and correct as of  "today:  Health Maintenance Due   Topic Date Due     ANNUAL REVIEW OF HM ORDERS  Never done     ADVANCE CARE PLANNING  Never done     ZOSTER IMMUNIZATION (2 of 3) 10/23/2013     MEDICARE ANNUAL WELLNESS VISIT  11/25/2021     PHQ-9  08/16/2022     Lab work is in process        Pertinent mammograms are reviewed under the imaging tab.    Review of Systems  Constitutional, HEENT, cardiovascular, pulmonary, GI, , musculoskeletal, neuro, skin, endocrine and psych systems are negative, except as otherwise noted.    OBJECTIVE:   There were no vitals taken for this visit. Estimated body mass index is 31.35 kg/m  as calculated from the following:    Height as of 2/16/22: 1.473 m (4' 10\").    Weight as of 2/16/22: 68 kg (150 lb).  Physical Exam  GENERAL: healthy, alert and no distress  EYES: Eyes grossly normal to inspection, PERRL and conjunctivae and sclerae normal  HENT: ear canals and TM's normal, nose and mouth without ulcers or lesions  NECK: no adenopathy, no asymmetry, masses, or scars and thyroid normal to palpation  RESP: lungs clear to auscultation - no rales, rhonchi or wheezes  CV: regular rate and rhythm, normal S1 S2, no S3 or S4, no murmur, click or rub, no peripheral edema and peripheral pulses strong  ABDOMEN: soft, nontender, no hepatosplenomegaly, no masses and bowel sounds normal  MS: no gross musculoskeletal defects noted, no edema  SKIN: no suspicious lesions or rashes  NEURO: Normal strength and tone, mentation intact and speech normal  PSYCH: mentation appears normal, affect normal/bright    Diagnostic Test Results:  Labs reviewed in Epic    ASSESSMENT / PLAN:       ICD-10-CM    1. Encounter for preventive health examination  Z00.00 Basic metabolic panel  (Ca, Cl, CO2, Creat, Gluc, K, Na, BUN)     CBC with platelets and differential     Basic metabolic panel  (Ca, Cl, CO2, Creat, Gluc, K, Na, BUN)     CBC with platelets and differential   2. Contusion of rib on left side, initial encounter  S20.212A " "lidocaine (LIDODERM) 5 % patch   3. Localized edema  R60.0 Compression Sleeve/Stocking Order for DME - ONLY FOR DME   4. Onychomycosis  B35.1 ciclopirox (PENLAC) 8 % external solution   5. Closed compression fracture of L4 lumbar vertebra with routine healing, subsequent encounter  S32.040D diclofenac (VOLTAREN) 1 % topical gel     Tenderness over the right rib field inferior aspect.  No abdominal tenderness..  Lung fields are clear.      COUNSELING:  Reviewed preventive health counseling, as reflected in patient instructions    Estimated body mass index is 31.35 kg/m  as calculated from the following:    Height as of 2/16/22: 1.473 m (4' 10\").    Weight as of 2/16/22: 68 kg (150 lb).        She reports that she has never smoked. She has never used smokeless tobacco.      Appropriate preventive services were discussed with this patient, including applicable screening as appropriate for cardiovascular disease, diabetes, osteopenia/osteoporosis, and glaucoma.  As appropriate for age/gender, discussed screening for colorectal cancer, prostate cancer, breast cancer, and cervical cancer. Checklist reviewing preventive services available has been given to the patient.    Reviewed patients plan of care and provided an AVS. The Basic Care Plan (routine screening as documented in Health Maintenance) for Aracelis meets the Care Plan requirement. This Care Plan has been established and reviewed with the Patient.    Counseling Resources:  ATP IV Guidelines  Pooled Cohorts Equation Calculator  Breast Cancer Risk Calculator  Breast Cancer: Medication to Reduce Risk  FRAX Risk Assessment  ICSI Preventive Guidelines  Dietary Guidelines for Americans, 2010  [employer]'s MyPlate  ASA Prophylaxis  Lung CA Screening    Truman Gordon MD  [facility]    Identified Health Risks:  "

## 2022-08-17 NOTE — PROGRESS NOTES
{PROVIDER CHARTING PREFERENCE:717729}    Janice Hsu is a 89 year old{ACCOMPANIED BY STATEMENT (Optional):329616}, presenting for the following health issues:  Follow Up      HPI      6 month f/u   {additonal problems for provider to add (Optional):420642}    Review of Systems   {ROS COMP (Optional):142540}      Objective    There were no vitals taken for this visit.  There is no height or weight on file to calculate BMI.  Physical Exam   {Exam List (Optional):609344}    {Diagnostic Test Results (Optional):744211}    {AMBULATORY ATTESTATION (Optional):898704}            .  ..

## 2022-08-17 NOTE — LETTER
August 22, 2022      Aracelis Blakely  [address]   [place] [address]        Dear Ms.Rabeti Blakely,    We are writing to inform you of your test results.    Your results look great.  Keep up the fantastic work.    Resulted Orders   Basic metabolic panel  (Ca, Cl, CO2, Creat, Gluc, K, Na, BUN)   Result Value Ref Range    Sodium 137 133 - 144 mmol/L    Potassium 4.3 3.4 - 5.3 mmol/L    Chloride 103 94 - 109 mmol/L    Carbon Dioxide (CO2) 30 20 - 32 mmol/L    Anion Gap 4 3 - 14 mmol/L    Urea Nitrogen 15 7 - 30 mg/dL    Creatinine 0.64 0.52 - 1.04 mg/dL    Calcium 9.5 8.5 - 10.1 mg/dL    Glucose 101 (H) 70 - 99 mg/dL    GFR Estimate 78 >60 mL/min/1.73m2      Comment:      Effective December 21, 2021 eGFRcr in adults is calculated using the 2021 CKD-EPI creatinine equation which includes age and gender (Fe fleming al., NE, DOI: 10.1056/JGAExy0301353)   CBC with platelets and differential   Result Value Ref Range    WBC Count 5.2 4.0 - 11.0 10e3/uL    RBC Count 4.68 3.80 - 5.20 10e6/uL    Hemoglobin 13.3 11.7 - 15.7 g/dL    Hematocrit 41.1 35.0 - 47.0 %    MCV 88 78 - 100 fL    MCH 28.4 26.5 - 33.0 pg    MCHC 32.4 31.5 - 36.5 g/dL    RDW 11.8 10.0 - 15.0 %    Platelet Count 190 150 - 450 10e3/uL    % Neutrophils 54 %    % Lymphocytes 29 %    % Monocytes 11 %    % Eosinophils 6 %    % Basophils 1 %    % Immature Granulocytes 0 %    Absolute Neutrophils 2.8 1.6 - 8.3 10e3/uL    Absolute Lymphocytes 1.5 0.8 - 5.3 10e3/uL    Absolute Monocytes 0.6 0.0 - 1.3 10e3/uL    Absolute Eosinophils 0.3 0.0 - 0.7 10e3/uL    Absolute Basophils 0.0 0.0 - 0.2 10e3/uL    Absolute Immature Granulocytes 0.0 <=0.4 10e3/uL       If you have any questions or concerns, please call the clinic at the number listed above.       Sincerely,      Truman Gordon MD

## 2022-08-18 ENCOUNTER — TELEPHONE (OUTPATIENT)
Dept: FAMILY MEDICINE | Facility: CLINIC | Age: 87
End: 2022-08-18

## 2022-08-18 DIAGNOSIS — S20.212A CONTUSION OF RIB ON LEFT SIDE, INITIAL ENCOUNTER: ICD-10-CM

## 2022-08-18 NOTE — TELEPHONE ENCOUNTER
PRIOR AUTHORIZATION DENIED    Medication: LIDOCAINE 5% PATCH - EPA DENIED    Denial Date:      Denial Rational: NOT COVERED FOR DX      Appeal Information:

## 2022-08-20 ENCOUNTER — TELEPHONE (OUTPATIENT)
Dept: FAMILY MEDICINE | Facility: CLINIC | Age: 87
End: 2022-08-20

## 2022-08-20 NOTE — TELEPHONE ENCOUNTER
Pt's daughter dropped off a $25 Annual Wellness Visit reward from [facility].    Just need Dr. Gordon to sign this form.    Pt or daughter will  when completed.    Call her at [phone].

## 2022-08-22 DIAGNOSIS — S20.212A CONTUSION OF RIB ON LEFT SIDE, INITIAL ENCOUNTER: ICD-10-CM

## 2022-08-22 RX ORDER — LIDOCAINE 50 MG/G
1 PATCH TOPICAL EVERY 24 HOURS
Qty: 7 PATCH | Refills: 0 | Status: SHIPPED | OUTPATIENT
Start: 2022-08-22 | End: 2022-08-25

## 2022-08-22 NOTE — TELEPHONE ENCOUNTER
Routing refill request to provider for review/approval because:  Pharmacy is requesting an alternate to the 5% patches or a PA   Pamela Uriarte RN

## 2022-08-25 DIAGNOSIS — S20.212A CONTUSION OF RIB ON LEFT SIDE, INITIAL ENCOUNTER: ICD-10-CM

## 2022-08-25 RX ORDER — LIDOCAINE 50 MG/G
1 PATCH TOPICAL EVERY 24 HOURS
Qty: 7 PATCH | Refills: 0 | Status: SHIPPED | OUTPATIENT
Start: 2022-08-25 | End: 2022-08-29

## 2022-08-25 NOTE — TELEPHONE ENCOUNTER
Message received from Pharmacy PA is needed lidocaine (LIDODERM) 5 % patch. Please advice if alterative Rx is appropriate or if ok to proceed with PA.

## 2022-08-26 ENCOUNTER — PATIENT OUTREACH (OUTPATIENT)
Dept: GERIATRIC MEDICINE | Facility: CLINIC | Age: 87
End: 2022-08-26

## 2022-08-29 DIAGNOSIS — S20.212A CONTUSION OF RIB ON LEFT SIDE, INITIAL ENCOUNTER: ICD-10-CM

## 2022-08-29 RX ORDER — LIDOCAINE 50 MG/G
1 PATCH TOPICAL EVERY 24 HOURS
Qty: 7 PATCH | Refills: 0 | Status: ON HOLD | OUTPATIENT
Start: 2022-08-29 | End: 2024-06-07

## 2022-08-29 NOTE — TELEPHONE ENCOUNTER
Routing refill request to provider for review/approval because:      Pharmacy comment: Alternative Requested:PA REQUIRED.    Please sent Rx alternative to pharmacy if appropriate.

## 2022-08-31 RX ORDER — LIDOCAINE 50 MG/G
PATCH TOPICAL
Qty: 7 PATCH | Refills: 0 | OUTPATIENT
Start: 2022-08-31

## 2022-08-31 NOTE — TELEPHONE ENCOUNTER
This refill request is a duplicate request, previously received or sent.  Sent denial notification to pharmacy.  Concetta Corral RN  [facility] Triage Team

## 2022-09-01 DIAGNOSIS — S20.212A CONTUSION OF RIB ON LEFT SIDE, INITIAL ENCOUNTER: ICD-10-CM

## 2022-09-02 RX ORDER — LIDOCAINE 50 MG/G
PATCH TOPICAL
Qty: 7 PATCH | Refills: 0 | OUTPATIENT
Start: 2022-09-02

## 2022-09-02 NOTE — TELEPHONE ENCOUNTER
This refill request is a duplicate request, previously received or sent.  Sent denial notification to pharmacy.  Concetta Corral RN  [facility] Triage Team

## 2022-09-07 ENCOUNTER — TELEPHONE (OUTPATIENT)
Dept: FAMILY MEDICINE | Facility: CLINIC | Age: 87
End: 2022-09-07

## 2022-09-07 NOTE — TELEPHONE ENCOUNTER
August 22, 2022      Aracelis Blakely  [address]   [place] [address]        Dear Ms.Rabeti Blakely,    We are writing to inform you of your test results.    Your results look great.  Keep up the fantastic work.    Resulted Orders   Basic metabolic panel  (Ca, Cl, CO2, Creat, Gluc, K, Na, BUN)   Result Value Ref Range    Sodium 137 133 - 144 mmol/L    Potassium 4.3 3.4 - 5.3 mmol/L    Chloride 103 94 - 109 mmol/L    Carbon Dioxide (CO2) 30 20 - 32 mmol/L    Anion Gap 4 3 - 14 mmol/L    Urea Nitrogen 15 7 - 30 mg/dL    Creatinine 0.64 0.52 - 1.04 mg/dL    Calcium 9.5 8.5 - 10.1 mg/dL    Glucose 101 (H) 70 - 99 mg/dL    GFR Estimate 78 >60 mL/min/1.73m2      Comment:      Effective December 21, 2021 eGFRcr in adults is calculated using the 2021 CKD-EPI creatinine equation which includes age and gender (Fe fleming al., NE, DOI: 10.1056/WHMKzs9351668)   CBC with platelets and differential   Result Value Ref Range    WBC Count 5.2 4.0 - 11.0 10e3/uL    RBC Count 4.68 3.80 - 5.20 10e6/uL    Hemoglobin 13.3 11.7 - 15.7 g/dL    Hematocrit 41.1 35.0 - 47.0 %    MCV 88 78 - 100 fL    MCH 28.4 26.5 - 33.0 pg    MCHC 32.4 31.5 - 36.5 g/dL    RDW 11.8 10.0 - 15.0 %    Platelet Count 190 150 - 450 10e3/uL    % Neutrophils 54 %    % Lymphocytes 29 %    % Monocytes 11 %    % Eosinophils 6 %    % Basophils 1 %    % Immature Granulocytes 0 %    Absolute Neutrophils 2.8 1.6 - 8.3 10e3/uL    Absolute Lymphocytes 1.5 0.8 - 5.3 10e3/uL    Absolute Monocytes 0.6 0.0 - 1.3 10e3/uL    Absolute Eosinophils 0.3 0.0 - 0.7 10e3/uL    Absolute Basophils 0.0 0.0 - 0.2 10e3/uL    Absolute Immature Granulocytes 0.0 <=0.4 10e3/uL       If you have any questions or concerns, please call the clinic at the number listed above.       Sincerely,      Truman Gordon MD

## 2022-09-07 NOTE — LETTER
September 8, 2022      Aracelis Blakely  [address]   [place] [address]        Dear Ms.Rabeti Blakely,    We are writing to inform you of your test results.     Your results look great.  Keep up the fantastic work.     Resulted Orders   Basic metabolic panel  (Ca, Cl, CO2, Creat, Gluc, K, Na, BUN)   Result Value Ref Range     Sodium 137 133 - 144 mmol/L     Potassium 4.3 3.4 - 5.3 mmol/L     Chloride 103 94 - 109 mmol/L     Carbon Dioxide (CO2) 30 20 - 32 mmol/L     Anion Gap 4 3 - 14 mmol/L     Urea Nitrogen 15 7 - 30 mg/dL     Creatinine 0.64 0.52 - 1.04 mg/dL     Calcium 9.5 8.5 - 10.1 mg/dL     Glucose 101 (H) 70 - 99 mg/dL     GFR Estimate 78 >60 mL/min/1.73m2         Comment:         Effective December 21, 2021 eGFRcr in adults is calculated using the 2021 CKD-EPI creatinine equation which includes age and gender (Fe fleming al., NE, DOI: 10.1056/WVLAde2521134)   CBC with platelets and differential   Result Value Ref Range     WBC Count 5.2 4.0 - 11.0 10e3/uL     RBC Count 4.68 3.80 - 5.20 10e6/uL     Hemoglobin 13.3 11.7 - 15.7 g/dL     Hematocrit 41.1 35.0 - 47.0 %     MCV 88 78 - 100 fL     MCH 28.4 26.5 - 33.0 pg     MCHC 32.4 31.5 - 36.5 g/dL     RDW 11.8 10.0 - 15.0 %     Platelet Count 190 150 - 450 10e3/uL     % Neutrophils 54 %     % Lymphocytes 29 %     % Monocytes 11 %     % Eosinophils 6 %     % Basophils 1 %     % Immature Granulocytes 0 %     Absolute Neutrophils 2.8 1.6 - 8.3 10e3/uL     Absolute Lymphocytes 1.5 0.8 - 5.3 10e3/uL     Absolute Monocytes 0.6 0.0 - 1.3 10e3/uL     Absolute Eosinophils 0.3 0.0 - 0.7 10e3/uL     Absolute Basophils 0.0 0.0 - 0.2 10e3/uL     Absolute Immature Granulocytes 0.0 <=0.4 10e3/uL         If you have any questions or concerns, please call the clinic at the number listed above.         Sincerely,        Truman Gordon MD

## 2022-09-07 NOTE — TELEPHONE ENCOUNTER
Patient requesting results of labs from 08/17/22. Please review and create result note and will follow up with patient.    Corona Carty CMA on 9/7/2022 at 8:26 AM

## 2022-09-12 DIAGNOSIS — B35.1 ONYCHOMYCOSIS: ICD-10-CM

## 2022-09-12 NOTE — TELEPHONE ENCOUNTER
New Medication Request    Contacts       Type Contact Phone/Fax    09/12/2022 11:28 AM CDT Phone (Incoming) Corbin Islas (Emergency Contact) [phone]          What medication are you requesting?: Medication for toenail fungus- Cicloprizox    Reason for medication request: nail fungus    Have you taken this medication before?: Yes:     Controlled Substance Agreement on file:   CSA -- Patient Level:    CSA: None found at the patient level.         Patient offered an appointment? No    Preferred Pharmacy:   [id number] - [place], [place] - [address]  [address] [facility] [address]  Phone: [phone] Fax: [phone]      Could we send this information to you in [facility] or would you prefer to receive a phone call?:   Patient would prefer a phone call   Okay to leave a detailed message?: Yes at Home number on file [phone] (home)    Ana Laura Roy/Lola-  [facility]

## 2022-09-15 RX ORDER — CICLOPIROX 80 MG/ML
SOLUTION TOPICAL
Qty: 6 ML | Refills: 0 | Status: SHIPPED | OUTPATIENT
Start: 2022-09-15 | End: 2022-11-09

## 2022-09-15 NOTE — TELEPHONE ENCOUNTER
Not on [facility] protocol.    Patient seen 8/17/22.    Please refill as appropriate.  Thank you,    Kelsy Marcelino RN  [facility]

## 2022-09-15 NOTE — TELEPHONE ENCOUNTER
Meanita calling to check on the status of medication request. Informed Mehrtarakesh that care team asks for 3 business days for refill requests. Please send refill if appropriate.    Zeenat Gutiérrez,  [facility]

## 2022-09-16 ENCOUNTER — PATIENT OUTREACH (OUTPATIENT)
Dept: GERIATRIC MEDICINE | Facility: CLINIC | Age: 87
End: 2022-09-16

## 2022-09-16 NOTE — PROGRESS NOTES
[facility] Care Coordination Contact      [facility] Six-Month Telephone Assessment    6 month telephone assessment completed on 09/16/2022.    ER visits: No  Hospitalizations: No  TCU stays: No  Significant health status changes:   Falls/Injuries: Yes: fall three months ago and was given a pain gel that helped. Reports no pain from fall now.  ADL/IADL changes: No  Changes in services: No    Caregiver Assessment follow up: Completed with dghtr.    Goals: See POC in chart for goal progress documentation.      Will see member in 6 months for an annual health risk assessment.   Encouraged member to call CC with any questions or concerns in the meantime.     MARTA Lino  [facility]  [phone]  Fax: [phone]

## 2022-09-18 DIAGNOSIS — F32.A DEPRESSION, UNSPECIFIED DEPRESSION TYPE: ICD-10-CM

## 2022-09-21 NOTE — TELEPHONE ENCOUNTER
Routing refill request to provider for review/approval because:  PHQ-9 score:    PHQ 2/13/2022   PHQ-9 Total Score 16   Q9: Thoughts of better off dead/self-harm past 2 weeks Not at all                 Erlin Sen RN  [facility] Triage Nurse

## 2022-11-09 ENCOUNTER — OFFICE VISIT (OUTPATIENT)
Dept: FAMILY MEDICINE | Facility: CLINIC | Age: 87
End: 2022-11-09
Payer: COMMERCIAL

## 2022-11-09 VITALS
HEART RATE: 99 BPM | OXYGEN SATURATION: 92 % | SYSTOLIC BLOOD PRESSURE: 155 MMHG | DIASTOLIC BLOOD PRESSURE: 85 MMHG | HEIGHT: 58 IN | BODY MASS INDEX: 30.86 KG/M2 | TEMPERATURE: 98.2 F | WEIGHT: 147 LBS

## 2022-11-09 DIAGNOSIS — B35.1 ONYCHOMYCOSIS: ICD-10-CM

## 2022-11-09 PROCEDURE — 99213 OFFICE O/P EST LOW 20 MIN: CPT | Performed by: PHYSICIAN ASSISTANT

## 2022-11-09 RX ORDER — CICLOPIROX 80 MG/ML
SOLUTION TOPICAL
Qty: 6 ML | Refills: 0 | Status: ON HOLD | OUTPATIENT
Start: 2022-11-09 | End: 2024-06-07

## 2022-11-09 ASSESSMENT — PAIN SCALES - GENERAL: PAINLEVEL: NO PAIN (0)

## 2022-11-09 ASSESSMENT — PATIENT HEALTH QUESTIONNAIRE - PHQ9: SUM OF ALL RESPONSES TO PHQ QUESTIONS 1-9: 5

## 2022-11-09 NOTE — PROGRESS NOTES
"  Assessment & Plan     Onychomycosis  - Orthopedic  Referral; Future  - ciclopirox (PENLAC) 8 % external solution; Apply to adjacent skin and affected nails daily.  Remove with alcohol every 7 days, then repeat.      ---Referral to podiatry for nail care. Inappropriate tools at the office here today. Will require grinding, too thick to cut without risk of injury to underlying nail/skin.  --Given secondary topical treatment if dissatisfied with ciclopirox       Return if symptoms worsen or fail to improve.    NIRU VILA PA-C  [facility]   Aracelis is a 89 year old accompanied by her daughter for primary translation as they speak Farsi, presenting for the following health issues:  Nail Problem    Has a chronic fungal nail infection that is being treated with ciclopirox at the moment without much efficacy. Does not have pain at the nails, but they are unable to cut the nails and are getting too long    History of Present Illness       Reason for visit:  Toenail fungus    She eats 4 or more servings of fruits and vegetables daily.She consumes 5 sweetened beverage(s) daily.She exercises with enough effort to increase her heart rate 10 to 19 minutes per day.  She exercises with enough effort to increase her heart rate 4 days per week.   She is taking medications regularly.         Review of Systems   Constitutional, HEENT, cardiovascular, pulmonary, gi and gu systems are negative, except as otherwise noted.      Objective    BP (!) 162/91 (BP Location: Right arm, Patient Position: Sitting, Cuff Size: Adult Regular)   Pulse 99   Temp 98.2  F (36.8  C) (Tympanic)   Ht 1.473 m (4' 10\")   Wt 66.7 kg (147 lb)   SpO2 92%   BMI 30.72 kg/m    Body mass index is 30.72 kg/m .  Physical Exam   GENERAL: healthy, alert and no distress  NECK: no adenopathy, no asymmetry, masses, or scars and thyroid normal to palpation  RESP: lungs clear to auscultation - no rales, rhonchi or " wheezes  CV: regular rate and rhythm, normal S1 S2, no S3 or S4, no murmur, click or rub, no peripheral edema and peripheral pulses strong  ABDOMEN: soft, nontender, no hepatosplenomegaly, no masses and bowel sounds normal  SKIN: BL great toe nails with deformities and thickness including discoloration. No tenderness to palpation. Nails are growing in a triangle pattern.   Small amount of lower leg edema present

## 2022-11-14 ENCOUNTER — MYC MEDICAL ADVICE (OUTPATIENT)
Dept: PODIATRY | Facility: CLINIC | Age: 87
End: 2022-11-14

## 2022-11-15 ENCOUNTER — OFFICE VISIT (OUTPATIENT)
Dept: URGENT CARE | Facility: URGENT CARE | Age: 87
End: 2022-11-15
Payer: COMMERCIAL

## 2022-11-15 VITALS
HEART RATE: 103 BPM | SYSTOLIC BLOOD PRESSURE: 156 MMHG | TEMPERATURE: 98.7 F | OXYGEN SATURATION: 93 % | BODY MASS INDEX: 30.72 KG/M2 | WEIGHT: 147 LBS | DIASTOLIC BLOOD PRESSURE: 86 MMHG

## 2022-11-15 DIAGNOSIS — B35.1 ONYCHOMYCOSIS OF TOENAIL: Primary | ICD-10-CM

## 2022-11-15 PROCEDURE — 99213 OFFICE O/P EST LOW 20 MIN: CPT | Performed by: NURSE PRACTITIONER

## 2022-11-16 NOTE — PROGRESS NOTES
Assessment & Plan     Onychomycosis of toenail    - Orthopedic  Referral; Future      15 minutes spent on the date of the encounter doing chart review, patient visit and documentation        See Patient Instructions    Return for Follow up with podiatry .    Melissa Pang, [facility] [place] Hsu is a 89 year old accompanied by her daughter, presenting for the following health issues:  Urgent Care and Fungal Infection (C/O fungal infection for 3 months)      HPI     Presents with toenail fungus to both great toes has been ongoing for the past 8 months.  Was given some antifungal nail lacquer to use which has helped somewhat.  Is looking to have her nails trimmed or even possibly removed.    Review of Systems   Constitutional, HEENT, cardiovascular, pulmonary, gi and gu systems are negative, except as otherwise noted.      Objective    BP (!) 156/86   Pulse 103   Temp 98.7  F (37.1  C) (Tympanic)   Wt 66.7 kg (147 lb)   SpO2 93%   BMI 30.72 kg/m    Body mass index is 30.72 kg/m .  Physical Exam   GENERAL: healthy, alert and no distress  SKIN: Nails to both great toes thick and yellow.

## 2022-11-25 NOTE — TELEPHONE ENCOUNTER
Spoke with patients daughter and son in law regarding Ubaldoyandy's appointment this afternoon. Jesse will not do the toenail removal based on Aracelis's age. She recommends following up with the foot care nurses. I provided them with scheduling numbers. I will cancel their appointment.

## 2022-12-02 ENCOUNTER — PATIENT OUTREACH (OUTPATIENT)
Dept: GERIATRIC MEDICINE | Facility: CLINIC | Age: 87
End: 2022-12-02

## 2022-12-02 NOTE — PROGRESS NOTES
"Received a call from son in-law. Son in-law received a denial letter (Date of Letter: 11/23/22, Claim number: [id number]) for payment of PCA services. PCA auth is up to date. LM for Hodan at [facility] requesting a call back re: why payment was denied.    Family has been trying to set up services to have Aracelis's toe nails looked at that have fungus. She was referred to a [facility] in [place] who wasn't able to help her and told the family \"I don't know why you were referred here.\" Was referred to a second provider who wouldn' t see her because of her age.    Family is having [employer] come out to her home to who said there isn't a lot they can do for toe fungus, but can help with providing relief. [employer] is not covered by insurance so family is paying out of pocket which they are okay with. Offered to send a list of podiatry clinics which son agreed to that are covered by insurance, but family will need to call and see if the clinic can see Aracelis.     Flora Luis, [facility]  [phone]  Fax: [phone]       "

## 2022-12-05 NOTE — PROGRESS NOTES
Received message from Hodan at [facility] ([phone]). All authorizations are up to date and there is not any reason that Aracelis should be receiving a denial. Hodan suggested calling [facility] for more information.     for [facility] inquiring about denial letter, if they have all the authorizations needed, and if there were any issues with billing.    Flora Luis, [facility]  [phone]  Fax: [phone]

## 2022-12-06 DIAGNOSIS — I10 HYPERTENSION, UNSPECIFIED TYPE: ICD-10-CM

## 2022-12-06 DIAGNOSIS — M81.0 OSTEOPOROSIS WITHOUT CURRENT PATHOLOGICAL FRACTURE, UNSPECIFIED OSTEOPOROSIS TYPE: ICD-10-CM

## 2022-12-08 ENCOUNTER — MEDICAL CORRESPONDENCE (OUTPATIENT)
Dept: HEALTH INFORMATION MANAGEMENT | Facility: CLINIC | Age: 87
End: 2022-12-08

## 2022-12-08 RX ORDER — MULTIVITAMIN WITH FOLIC ACID 400 MCG
1 TABLET ORAL DAILY
Qty: 90 TABLET | Refills: 0 | Status: SHIPPED | OUTPATIENT
Start: 2022-12-08 | End: 2023-03-06

## 2022-12-08 RX ORDER — AMLODIPINE BESYLATE 2.5 MG/1
2.5 TABLET ORAL DAILY
Qty: 90 TABLET | Refills: 1 | Status: SHIPPED | OUTPATIENT
Start: 2022-12-08 | End: 2023-05-25

## 2022-12-08 NOTE — PROGRESS NOTES
[facility] called back stating that CC was transferred to the wrong person and they would forward message to billing. Have not heard back from [facility]. LM for daughter to call CC re: denial letter. Will inform daughter that [employer] has everything current on file and all PCA services should be covered.    MARTA Lino  [facility]  [phone]  Fax: [phone]

## 2023-01-18 ENCOUNTER — PATIENT OUTREACH (OUTPATIENT)
Dept: GERIATRIC MEDICINE | Facility: CLINIC | Age: 88
End: 2023-01-18
Payer: COMMERCIAL

## 2023-01-18 NOTE — PROGRESS NOTES
[facility] Care Coordination Contact    Encounter opened due to Regulatory [employer] Svetlana Update to open FVP Program.    Fozia Persaud  Care Management Specialist  [facility]  [phone]

## 2023-01-18 NOTE — TELEPHONE ENCOUNTER
"OREED SHELL JINA-VIT D 500-200 TB  Last Written Prescription Date:  12/12/17  Last Fill Quantity: 180,  # refills: 3   Last office visit: 8/1/2018 with prescribing provider:  08/01/18   Future Office Visit:    Requested Prescriptions   Pending Prescriptions Disp Refills     OYSTER SHELL CALCIUM/D 500-200 MG-UNIT tablet [Pharmacy Med Name: OYS SHELL JINA-VIT D 500-200 TB] 180 tablet 3     Sig: TAKE 1 TABLET BY MOUTH 2 TIMES DAILY WITH MEALS.    Vitamin Supplements (Adult) Protocol Failed - 2/13/2019  2:07 PM       Failed - Medication is active on med list       Passed - High dose Vitamin D not ordered       Passed - Recent (12 mo) or future (30 days) visit within the authorizing provider's specialty    Patient had office visit in the last 12 months or has a visit in the next 30 days with authorizing provider or within the authorizing provider's specialty.  See \"Patient Info\" tab in inbasket, or \"Choose Columns\" in Meds & Orders section of the refill encounter.                " -3

## 2023-01-18 NOTE — PROGRESS NOTES
[facility] Care Coordination Contact    Encounter opened due to Regulatory [employer] Svetlana Update to close FVP Program.      Fozia Persaud  Care Management Specialist  [facility]  [phone]

## 2023-02-02 ENCOUNTER — PATIENT OUTREACH (OUTPATIENT)
Dept: GERIATRIC MEDICINE | Facility: CLINIC | Age: 88
End: 2023-02-02
Payer: COMMERCIAL

## 2023-02-02 NOTE — PROGRESS NOTES
[facility] Care Coordination Contact    Called adult daughter Yoselin to schedule annual HRA home visit. HRA has been scheduled for 2/7/23 at 3:00.  Called [facility] and scheduled an  for the home visit.   Komal Hector RN  [facility] Care Coordinator  [phone]

## 2023-02-07 NOTE — PROGRESS NOTES
[facility] Care Coordination Contact    CC received a call from Magui Raines  and she is unavailable now for assessment on 2/7. Assessment date changed to 2/10 at 0930. Family aware.   Komal Hector RN  [facility] Care Coordinator  [phone]

## 2023-02-10 ENCOUNTER — PATIENT OUTREACH (OUTPATIENT)
Dept: GERIATRIC MEDICINE | Facility: CLINIC | Age: 88
End: 2023-02-10
Payer: COMMERCIAL

## 2023-02-10 ASSESSMENT — PATIENT HEALTH QUESTIONNAIRE - PHQ9: SUM OF ALL RESPONSES TO PHQ QUESTIONS 1-9: 2

## 2023-02-10 NOTE — PROGRESS NOTES
[facility] Care Coordination Contact    [facility] Home Visit Assessment     Home visit for Health Risk Assessment with Aracelis Blakely completed on February 10, 2023. Her daughter Yoselin and Magui  Blanca (via phone) also present at the home visit.     Type of residence:: Apartment - handicap accessible  Current living arrangement:: I live alone     Assessment completed with:: Patient, Family, Other (Farsi )    Current Care Plan  Member currently receiving the following home care services:   None  Member currently receiving the following community resources: DME, PCA, Lifeline      Medication Review  Medication reconciliation completed in Epic: Yes  Medication set-up & administration: Family/informal caregiver sets up weekly.  Self-administers medications.  Medication Risk Assessment Medication (1 or more, place referral to MTM): N/A: No risk factors identified  MTM Referral Placed: No: No risk factors idenified    Mental/Behavioral Health   Depression Screening:      PHQ-9 Total Score: 2    Mental health DX:: Yes (Anxiety)   Mental health DX how managed:: Medication    Falls Assessment:   Fallen 2 or more times in the past year?: Yes   Any fall with injury in the past year?: No    ADL/IADL Dependencies:   Dependent ADLs:: Ambulation-cane, Ambulation-walker, Bathing, Dressing, Eating, Grooming, Incontinence, Transfers, Wheelchair-with assist, Toileting  Dependent IADLs:: Cleaning, Cooking, Laundry, Shopping, Meal Preparation, Medication Management, Money Management, Transportation, Incontinence    [facility] sponsored benefits: Shared information re: Silver Sneakers/gym memberships, ASA, Calcium +D.    PCA Assessment completed at visit: Yes Annual PCA assessment indicated 44 units per day of PCA. This is the same as the previous assessment.     Elderly Waiver Eligibility: Yes-will continue on EW    Care Plan & Recommendations: Continue current services of PCA,  lifeline and incontinence supplies. Member has a good support system of family, friends and neighbors who check in with her frequently and assist her.     See [facility] for detailed assessment information.    Follow-Up Plan: Member informed of future contact, plan to f/u with member with a 6 month telephone assessment.  Contact information shared with member and family, encouraged member to call with any questions or concerns at any time.    [place] care continuum providers: Please see Snapshot and Care Management Flowsheets for Specific details of care plan.    This CC note routed to PCP.  Komal Hector RN  [facility] Care Coordinator  [phone]

## 2023-02-10 NOTE — Clinical Note
Member has seen Dr. Gordon in the past. She still goes to this clinic. She had an annual home visit for assessment. Is doing well with her current services.

## 2023-02-16 ENCOUNTER — PATIENT OUTREACH (OUTPATIENT)
Dept: GERIATRIC MEDICINE | Facility: CLINIC | Age: 88
End: 2023-02-16
Payer: COMMERCIAL

## 2023-02-16 NOTE — PROGRESS NOTES
[facility] Care Coordination Contact    Received after visit chart from care coordinator.  Completed following tasks: Mailed copy of care plan to client, Updated services in Database, Submitted referrals/auths for PERS and Mailed Safe Medication Disposal   , Provider Signature - No POC Shared:  Member indicates that they do not want their POC shared with any EW providers.    [facility]:  Emailed completed PCA assessment to [facility].  Faxed copy of PCA assessment to PCA Agency and mailed copy to member.  Faxed MD Communication to PCP.     Iona Dhillon  Case Management Specialist  [facility]  [phone]

## 2023-03-06 DIAGNOSIS — I10 HYPERTENSION, UNSPECIFIED TYPE: ICD-10-CM

## 2023-03-06 DIAGNOSIS — F32.A DEPRESSION, UNSPECIFIED DEPRESSION TYPE: ICD-10-CM

## 2023-03-06 DIAGNOSIS — M81.0 OSTEOPOROSIS WITHOUT CURRENT PATHOLOGICAL FRACTURE, UNSPECIFIED OSTEOPOROSIS TYPE: ICD-10-CM

## 2023-03-06 RX ORDER — MULTIVITAMIN WITH FOLIC ACID 400 MCG
TABLET ORAL
Qty: 90 TABLET | Refills: 0 | Status: SHIPPED | OUTPATIENT
Start: 2023-03-06 | End: 2023-05-25

## 2023-03-08 ENCOUNTER — OFFICE VISIT (OUTPATIENT)
Dept: FAMILY MEDICINE | Facility: CLINIC | Age: 88
End: 2023-03-08
Payer: COMMERCIAL

## 2023-03-08 VITALS
RESPIRATION RATE: 19 BRPM | WEIGHT: 145.6 LBS | SYSTOLIC BLOOD PRESSURE: 125 MMHG | HEIGHT: 58 IN | DIASTOLIC BLOOD PRESSURE: 75 MMHG | TEMPERATURE: 98.7 F | OXYGEN SATURATION: 98 % | HEART RATE: 79 BPM | BODY MASS INDEX: 30.56 KG/M2

## 2023-03-08 DIAGNOSIS — F32.A DEPRESSION, UNSPECIFIED DEPRESSION TYPE: ICD-10-CM

## 2023-03-08 DIAGNOSIS — Z76.89 ENCOUNTER TO ESTABLISH CARE: Primary | ICD-10-CM

## 2023-03-08 DIAGNOSIS — I10 HYPERTENSION, UNSPECIFIED TYPE: Chronic | ICD-10-CM

## 2023-03-08 PROCEDURE — 99213 OFFICE O/P EST LOW 20 MIN: CPT | Performed by: PHYSICIAN ASSISTANT

## 2023-03-08 ASSESSMENT — PAIN SCALES - GENERAL: PAINLEVEL: NO PAIN (0)

## 2023-03-08 NOTE — PROGRESS NOTES
Assessment & Plan     Encounter to establish care  Very pleasant 89 year old here today to establish care. We will plan for AWV in August when she is do. Encouraged her or her family to reach out     Hypertension, unspecified type  Well controlled on Amlodipine. Reviewed recent labs. Stable kidney function.     Depression, unspecified depression type  Continue Zoloft 50 mg daily. Mood stable.       20 minutes spent on the date of the encounter doing chart review, review of test results, interpretation of tests, patient visit and documentation         Return in about 5 months (around 8/8/2023) for Routine preventive, with me.    The likelihood of other entities in the differential is insufficient to justify any further testing for them at this time. This was explained to the patient. The patient was advised that persistent or worsening symptoms would require further evaluation. Patient advised to call the office and if unable to reach to go to the emergency room if they develop any new or worsening symptoms. Expressed understanding and agreement with above stated plan.     JIMMY [facility]   Aracelis Blakely is a 89 year old female presenting for the following health issues:  Patient presents with:  Establish Care    Here today to establish care. Doing very well. Stable on current medications. Lives in [place]. Great support from PCA and friends in her apartment building. Denies complaints at todays visit. Previously a patient of Dr. Gordon. Last AWV in August. She is originally from [place]. Sees eye doc and dentist. Appetite stable. No falls. Uses cane/walker. Not due for any refills today.       Review of Systems   Constitutional, HEENT, cardiovascular, pulmonary, GI, , musculoskeletal, neuro, skin, endocrine and psych systems are negative, except as otherwise noted.      Objective    /75   Pulse 79   Temp 98.7  F (37.1  C) (Tympanic)   Resp 19   " Ht 1.473 m (4' 10\")   Wt 66 kg (145 lb 9.6 oz)   SpO2 98%   BMI 30.43 kg/m    4' 10\"  145 lbs 9.6 oz    No Known Allergies  Current Outpatient Medications   Medication Sig Dispense Refill     acetaminophen (TYLENOL) 325 MG tablet Take 325-650 mg by mouth every 4 hours as needed for mild pain Max acetaminophen dose: 4000 mg in 24 hrs       albuterol (2.5 MG/3ML) 0.083% neb solution Inhale 2.5 mg into the lungs every 4 hours as needed        ALPRAZolam (XANAX) 0.25 MG tablet Take 0.25 mg by mouth 3 times daily as needed for anxiety       amLODIPine (NORVASC) 2.5 MG tablet Take 1 tablet (2.5 mg) by mouth daily 90 tablet 1     Ascorbic Acid (VITAMIN C/BIOFLAVONOIDS/ROSEHP PO) Take 1 tablet by mouth daily        aspirin (ASPIRIN LOW DOSE) 81 MG EC tablet TAKE 1 TABLET BY MOUTH EVERY DAY WITH FOOD STRENGTH: 81 MG 90 tablet 0     azelastine (OPTIVAR) 0.05 % SOLN ophthalmic solution Place 1 drop into both eyes 2 times daily       calcium carbonate-vitamin D (OSCAL) 500-5 MG-MCG tablet TAKE 1 TABLET BY MOUTH TWICE A DAY WITH MEALS 180 tablet 0     cholecalciferol (VITAMIN D3) 1000 UNIT tablet Take 1,000 Units by mouth daily        ciclopirox (PENLAC) 8 % external solution Apply to adjacent skin and affected nails daily.  Remove with alcohol every 7 days, then repeat. 6 mL 0     guaiFENesin (MUCINEX) 600 MG 12 hr tablet Take 1,200 mg by mouth 2 times daily as needed for congestion       Multiple Vitamin (DAILY-NETTA MULTIVITAMIN) TABS TAKE 1 TABLET BY MOUTH EVERY DAY 90 tablet 0     Omega-3 Fatty Acids (OMEGA-3 FISH OIL) 1200 MG CAPS Take 1,200 mg by mouth daily        omeprazole (PRILOSEC) 20 MG DR capsule TAKE 1 CAPSULE BY MOUTH EVERY DAY BEFORE A MEAL 90 capsule 3     order for DME WALKER; with wheels, and brakes, and a seat. 1 Device 0     order for DME Equipment being ordered:  Compression stockings,knee high,mild compression 3 Units 3     order for DME Knee high compression stockings.  Light compression.  16-20mm " Hg.  Ok 3 pairs.       OVER-THE-COUNTER 550 mg 3 times daily as needed (Takes 3x daily when senokot isn't available or if senokot doesn't work for her.)       polyethylene glycol (MIRALAX) 17 GM/Dose powder Take 17 g by mouth daily as needed for constipation 510 g 0     senna (SM SENNA LAXATIVE) 8.6 MG tablet TAKE 3 TABLETS BY MOUTH EVERY DAY 90 tablet 11     sertraline (ZOLOFT) 50 MG tablet TAKE 1 TABLET BY MOUTH EVERY DAY 90 tablet 1     diclofenac (VOLTAREN) 1 % topical gel Apply 4 g topically 2 times daily (Patient not taking: Reported on 3/8/2023) 200 g 2     Efinaconazole 10 % SOLN Externally apply topically daily (Patient not taking: Reported on 3/8/2023) 8 mL 0     gabapentin (NEURONTIN) 100 MG capsule Take 1 capsule (100 mg) by mouth nightly as needed for neuropathic pain (Patient not taking: Reported on 3/8/2023) 60 capsule 1     lidocaine (LIDODERM) 5 % patch PLACE 1 PATCH ONTO THE SKIN EVERY 24 HOURS TO PREVENT LIDOCAINE TOXICITY, PATIENT SHOULD BE PATCH FREE FOR 12 HRS DAILY. (Patient not taking: Reported on 3/8/2023) 7 patch 0     Past Medical History:   Diagnosis Date     Hypertension      Past Surgical History:   Procedure Laterality Date     CHOLECYSTECTOMY       RELEASE CARPAL TUNNEL Right 6/13/2016       Physical Exam   GENERAL: healthy, alert and no distress  EYES: Eyes grossly normal to inspection, PERRL and conjunctivae and sclerae normal  HENT: ear canals and TM's normal, nose and mouth without ulcers or lesions  NECK: no adenopathy, no asymmetry, masses, or scars and thyroid normal to palpation  RESP: lungs clear to auscultation - no rales, rhonchi or wheezes  CV: regular rate and rhythm, normal S1 S2, no S3 or S4, no murmur, click or rub, no peripheral edema  MS: no gross musculoskeletal defects noted, no edema  SKIN: no suspicious lesions or rashes  NEURO: Normal strength and tone, mentation intact and speech normal  PSYCH: mentation appears normal, affect normal/bright

## 2023-04-04 NOTE — PROGRESS NOTES
[facility] Notification:    MEMBER ID MEMBER MEMBER  [facility]CC PROVIDER TYPE OF SERVICE PROCEDURE CODE START DATE END DATE STATUS REASON UNITS AUTHORIZED COMMENTS    [id number] KYLEE KENNEDYJessicaEVITAAGUSTIN 3/21/1922 [facility] PCA: Assess Same PCA:  2023 Medically Necessary 6732 PCA Services approved at 10 or more hours per day. If billing (modifier TG) and provider requirements are met services are eligible to be paid at the 7.5% enhanced rate    [id number] KYLEE KENNEDYJessicaEVITAAGUSTIN 3/21/1922 [facility] PCA: Assess Same PCA:  2023 Medically Necessary 8008 PCA Services approved at 10 or more hours per day. If billing (modifier TG) and provider requirements are met services are eligible to be paid at the 7.5% enhanced rate     MARTA Lino  [facility]  [phone]  Fax: [phone]

## 2023-04-12 ENCOUNTER — HOSPITAL ENCOUNTER (EMERGENCY)
Facility: CLINIC | Age: 88
Discharge: HOME | End: 2023-04-13
Attending: EMERGENCY MEDICINE | Admitting: EMERGENCY MEDICINE
Payer: COMMERCIAL

## 2023-04-12 ENCOUNTER — APPOINTMENT (OUTPATIENT)
Dept: CT IMAGING | Facility: CLINIC | Age: 88
End: 2023-04-12
Attending: EMERGENCY MEDICINE
Payer: COMMERCIAL
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
## 2023-04-12 DIAGNOSIS — S01.81XA FACIAL LACERATION, INITIAL ENCOUNTER: ICD-10-CM

## 2023-04-12 LAB
ATRIAL RATE - MUSE: 89 BPM
DIASTOLIC BLOOD PRESSURE - MUSE: NORMAL MMHG
INTERPRETATION ECG - MUSE: NORMAL
P AXIS - MUSE: 36 DEGREES
PR INTERVAL - MUSE: 192 MS
QRS DURATION - MUSE: 84 MS
QT - MUSE: 358 MS
QTC - MUSE: 435 MS
R AXIS - MUSE: -49 DEGREES
SYSTOLIC BLOOD PRESSURE - MUSE: NORMAL MMHG
T AXIS - MUSE: 56 DEGREES
VENTRICULAR RATE- MUSE: 89 BPM

## 2023-04-12 PROCEDURE — 93005 ELECTROCARDIOGRAM TRACING: CPT

## 2023-04-12 PROCEDURE — 99284 EMERGENCY DEPT VISIT MOD MDM: CPT | Mod: 25

## 2023-04-12 PROCEDURE — 12011 RPR F/E/E/N/L/M 2.5 CM/<: CPT

## 2023-04-12 PROCEDURE — 70450 CT HEAD/BRAIN W/O DYE: CPT

## 2023-04-12 ASSESSMENT — ACTIVITIES OF DAILY LIVING (ADL): ADLS_ACUITY_SCORE: 35

## 2023-04-13 ENCOUNTER — PATIENT OUTREACH (OUTPATIENT)
Dept: GERIATRIC MEDICINE | Facility: CLINIC | Age: 88
End: 2023-04-13
Payer: COMMERCIAL

## 2023-04-13 VITALS
SYSTOLIC BLOOD PRESSURE: 146 MMHG | OXYGEN SATURATION: 94 % | DIASTOLIC BLOOD PRESSURE: 78 MMHG | HEART RATE: 86 BPM | RESPIRATION RATE: 18 BRPM | TEMPERATURE: 97 F

## 2023-04-13 NOTE — ED NOTES
Pt offered use of  services for discharge instructions. Pt declined, and requested daughter to interpret.

## 2023-04-13 NOTE — ED PROVIDER NOTES
History     Chief Complaint:  Fall and Head Injury       HPI   Aracelis Blakely is a 89 year old female who presented to emergency department after a fall.  She reports that she bent over and then lost her balance and fell.  Denies loss of consciousness.  Denies headache or vomiting.  Denies any neck pain or other injuries.  Tetanus is up-to-date.      Independent Historian:   History obtained by her daughter who is in the emergency department with her.  Daughter reports that the patient is acting normally    ROS:  Review of Systems  Facial laceration    Allergies:  No Known Allergies     Medications:    acetaminophen (TYLENOL) 325 MG tablet  albuterol (2.5 MG/3ML) 0.083% neb solution  ALPRAZolam (XANAX) 0.25 MG tablet  amLODIPine (NORVASC) 2.5 MG tablet  Ascorbic Acid (VITAMIN C/BIOFLAVONOIDS/ROSEHP PO)  aspirin (ASPIRIN LOW DOSE) 81 MG EC tablet  azelastine (OPTIVAR) 0.05 % SOLN ophthalmic solution  calcium carbonate-vitamin D (OSCAL) 500-5 MG-MCG tablet  cholecalciferol (VITAMIN D3) 1000 UNIT tablet  ciclopirox (PENLAC) 8 % external solution  diclofenac (VOLTAREN) 1 % topical gel  Efinaconazole 10 % SOLN  gabapentin (NEURONTIN) 100 MG capsule  guaiFENesin (MUCINEX) 600 MG 12 hr tablet  lidocaine (LIDODERM) 5 % patch  Multiple Vitamin (DAILY-NETTA MULTIVITAMIN) TABS  Omega-3 Fatty Acids (OMEGA-3 FISH OIL) 1200 MG CAPS  omeprazole (PRILOSEC) 20 MG DR capsule  order for DME  order for DME  order for DME  OVER-THE-COUNTER  polyethylene glycol (MIRALAX) 17 GM/Dose powder  senna (SM SENNA LAXATIVE) 8.6 MG tablet  sertraline (ZOLOFT) 50 MG tablet      Past Medical History:    Past Medical History:   Diagnosis Date     Hypertension      Past Surgical History:    Past Surgical History:   Procedure Laterality Date     CHOLECYSTECTOMY       RELEASE CARPAL TUNNEL Right 6/13/2016      Family History:    family history includes Unknown/Adopted in her father and mother.    Social History:   reports that she has never  smoked. She has never used smokeless tobacco. She reports that she does not drink alcohol and does not use drugs.  PCP: Ervin Ledesma     Physical Exam     Patient Vitals for the past 24 hrs:   BP Temp Temp src Pulse Resp SpO2   04/12/23 2003 (!) 185/97 97  F (36.1  C) Temporal 94 18 94 %        Physical Exam  General: Sitting up in bed  Eyes:  The pupils are equal and round    Conjunctivae and sclerae are normal  ENT:    Laceration on right eyebrow.  Minimal tenderness over the right eyebrow where the laceration is but no other facial tenderness  Neck:  Normal range of motion. Non tender cervical spine  CV:  Regular rate, regular rhythm     Skin warm and well perfused   Resp:  Non labored breathing on room air    No tachypnea    No cough heard, lungs clear bilaterally  MS:  Normal muscular tone  Skin:  Small amount of ecchymosis near her right eyebrow  Neuro:   Awake, alert.  GCS 15    Speech is normal and fluent.    Face is symmetric.     Moves all extremities equally  Psych: Normal affect.  Appropriate interactions.    Emergency Department Course     ECG results from 04/12/23   EKG 12 lead     Value    Systolic Blood Pressure     Diastolic Blood Pressure     Ventricular Rate 89    Atrial Rate 89    WV Interval 192    QRS Duration 84        QTc 435    P Axis 36    R AXIS -49    T Axis 56    Interpretation ECG      Sinus rhythm  Left anterior fascicular block  Inferior infarct , age undetermined  Possible Anterior infarct , age undetermined  Abnormal ECG  When compared with ECG of 21-MAY-2021 20:31,  Borderline criteria for Anterior infarct are now Present  Inferior infarct is now Present  Confirmed by GENERATED REPORT, COMPUTER ([id number]),  Melissa Jeronimo ([id number]) on 4/12/2023 8:29:05 PM         Imaging:  Head CT w/o contrast   Final Result   IMPRESSION:   1.  No acute intracranial hemorrhage, mass, or herniation.   2.  Moderate diffuse parenchymal volume loss and white matter changes most likely  due to chronic microvascular ischemic disease.   3.  Right periorbital soft tissue injury. No calvarial fracture however the facial bones are not entirely included on this study.         Report per radiology      Procedures     Laceration Repair      Procedure: Laceration Repair    Indication: Laceration    Consent: Verbal    Location: Right eyebrow    Length: 1.5 cm    Preparation: Irrigation with Tap Water.      Treatment/Exploration: Wound explored, no foreign bodies found     Closure: The wound was closed with Tissue Adhesive.    Patient Status: The patient tolerated the procedure well: Yes. There were no complications      Emergency Department Course & Assessments:    Disposition:  The patient was discharged to home.     Impression & Plan      Medical Decision Making:  Aracelis Blakely is 89-year-old female who presented to the emergency department with fall.  Patient reports that she was leaning over and fell and lost her balance.  She has a laceration on the right eyebrow that was repaired with Dermabond.  Tetanus up-to-date.  CT head shows no acute pathology.  Doubt facial fracture.  No midline cervical spine tenderness and has full range of motion of her neck.  No other injuries from the fall.  Discussed wound care with the patient and daughter.    Diagnosis:    ICD-10-CM    1. Fall, initial encounter  W19.XXXA       2. Facial laceration, initial encounter  S01.81XA            4/12/2023   Chiquita Lopes MD Goertz, Maria Kristine, MD  04/13/23 0719

## 2023-04-13 NOTE — PROGRESS NOTES
[facility] Care Coordination Contact  CC received notification of Emergency Room visit.  ER visit occurred on 04/12/2023 at [facility] with Dx of Fall, Head Injury.    CC contacted adult daughter Yoselin and reviewed discharge summary. Daughter reported she took Ubaldoileh to the ER to make sure everything was okay. No concerns and Aracelis is doing well.  Member has a follow-up appointment with PCP: No: Offered Assistance with setting up a follow up appointment  Member has had a change in condition: No  New referrals placed: No. No requests at this time.  Home Visit Needed: No  Care plan reviewed and updated.  PCP notified of ED visit via EMR.    MRATA Lino  [facility]  [phone]  Fax: [phone]

## 2023-04-13 NOTE — ED TRIAGE NOTES
Pt had unwitnessed fall toady, landing on face. Pt states she does not know if she lost consciousness. Pt is using son as  and son states that pt is answering all questions appropriately. No thinners, pt on Asprin. Lac and hematoma noted to R eye.      Triage Assessment     Row Name 04/12/23 2002       Triage Assessment (Adult)    Airway WDL WDL       Respiratory WDL    Respiratory WDL WDL       Skin Circulation/Temperature WDL    Skin Circulation/Temperature WDL WDL       Cardiac WDL    Cardiac WDL WDL       Peripheral/Neurovascular WDL    Peripheral Neurovascular WDL WDL       Cognitive/Neuro/Behavioral WDL    Cognitive/Neuro/Behavioral WDL WDL

## 2023-04-13 NOTE — DISCHARGE INSTRUCTIONS
Can shower in 24 hours  No ointment on the wound on face  The glue will fall off on its own    Discharge Instructions  Laceration (Cut)    You were seen today for a laceration (cut).  Your provider examined your laceration for any problems such a buried foreign body (like glass, a splinter, or gravel), or injury to blood vessels, tendons, and nerves.  Your provider may have also rinsed and/or scrubbed your laceration to help prevent an infection. It may not be possible to find all problems with your laceration on the first visit; occasionally foreign bodies or a tendon injury can go undetected.    Your laceration may have been closed in one of several ways:  No closure: many wounds will heal just fine without closure.  Stitches: regular stitches that require removal.  Staples: skin staples are often used in the scalp/head.  Wound adhesive (glue): skin glue can be used for certain lacerations and doesn t require removal.  Wound strips (aka Butterfly bandages or steri-strips): these are bandages that help to close a wound.  Absorbable stitches:  dissolving  stitches that go away on their own and usually don t require removal.    A small percentage of wounds will develop an infection regardless of how well the wound is cared for. Antibiotics are generally not indicated to prevent an infection so are only given for a small number of high-risk wounds. Some lacerations are too high risk to close, and are left open to heal because closure can increase the likelihood that an infection will develop.    Remember that all lacerations, no matter how expertly repaired, will cause scarring. We consider many factors, techniques, and materials, in our efforts to provide the best possible cosmetic outcome.    Generally, every Emergency Department visit should have a follow-up clinic visit with either a primary or a specialty clinic/provider. Please follow-up as instructed by your emergency provider today.     Return to the Emergency  Department right away if:  You have more redness, swelling, pain, drainage (pus), a bad smell, or red streaking from your laceration as these symptoms could indicate an infection.  You have a fever of 100.4 F or more.  You have bleeding that you cannot stop at home. If your cut starts to bleed, hold pressure on the bleeding area with a clean cloth or put pressure over the bandage.  If the bleeding does not stop after using constant pressure for 30 minutes, you should return to the Emergency Department for further treatment.  An area past the laceration is cool, pale, or blue compared with the other side, or has a slower return of color when squeezed.  Your dressing seems too tight or starts to get uncomfortable or painful. For children, signs of a problem might be irritability or restlessness.  You have loss of normal function or use of an area, such as being unable to straighten or bend a finger normally.  You have a numb area past the laceration.    Return to the Emergency Department or see your regular provider if:  The laceration starts to come open.   You have something coming out of the cut or a feeling that there is something in the laceration.  Your wound will not heal, or keeps breaking open. There can always be glass, wood, dirt or other things in any wound.  They will not always show up, even on x-rays.  If a wound does not heal, this may be why, and it is important to follow-up with your regular provider.    Home Care:  Take your dressing off in 12-24 hours, or as instructed by your provider, to check your laceration. Remove the dressing sooner if it seems too tight or painful, or if it is getting numb, tingly, or pale past the dressing.  Gently wash your laceration 1-2 times daily with clean water and mild soap. It is okay to shower or run clean water over the laceration, but do not let the laceration soak in water (no swimming).  If your laceration was closed with wound adhesive or strips: pat it dry  and leave it open to the air. For all other repairs: after you wash your laceration, or at least 2 times a day, apply antibiotic ointment (such as Neosporin  or Bacitracin ) to the laceration, then cover it with a Band-Aid  or gauze.  Keep the laceration clean. Wear gloves or other protective clothing if you are around dirt.    Follow-up for removal:  If your wound was closed with staples or regular stitches, they need to be removed according to the instructions and timeline specified by your provider today.  If your wound was closed with absorbable ( dissolving ) sutures, they should fall out, dissolve, or not be visible in about one week. If they are still visible, then they should be removed according to the instructions and timeline specified by your provider today.    Scars:  To help minimize scarring:  Wear sunscreen over the healed laceration when out in the sun.  Massage the area regularly once healed.  You may apply Vitamin E to the healed wound.  Wait. Scars improve in appearance over months and years.    If you were given a prescription for medicine here today, be sure to read all of the information (including the package insert) that comes with your prescription.  This will include important information about the medicine, its side effects, and any warnings that you need to know about.  The pharmacist who fills the prescription can provide more information and answer questions you may have about the medicine.  If you have questions or concerns that the pharmacist cannot address, please call or return to the Emergency Department.       Remember that you can always come back to the Emergency Department if you are not able to see your regular provider in the amount of time listed above, if you get any new symptoms, or if there is anything that worries you.

## 2023-05-02 DIAGNOSIS — S32.040D CLOSED COMPRESSION FRACTURE OF L4 LUMBAR VERTEBRA WITH ROUTINE HEALING, SUBSEQUENT ENCOUNTER: ICD-10-CM

## 2023-05-02 NOTE — TELEPHONE ENCOUNTER
Medication Question or Refill    Contacts       Type Contact Phone/Fax    05/02/2023 09:34 AM CDT Phone (Incoming) Corbin Islas (Emergency Contact)           What medication are you calling about (include dose and sig)?:  Diclofenac 1% topical gel    Preferred Pharmacy:   [facility] [address] [place] [facility] [address]  [address] [facility] [address]  Phone: [phone] Fax: [phone]            Do you need a refill? Yes    When did you use the medication last? Back started to bother her again    Patient offered an appointment? No    Do you have any questions or concerns?  No      Could we send this information to you in [facility] or would you prefer to receive a phone call?:   Patient would prefer a phone call   Okay to leave a detailed message?: Yes at Cell number on file:    Telephone Information:   Mobile [phone]

## 2023-05-25 DIAGNOSIS — I10 HYPERTENSION, UNSPECIFIED TYPE: ICD-10-CM

## 2023-05-25 DIAGNOSIS — K21.9 GASTROESOPHAGEAL REFLUX DISEASE WITHOUT ESOPHAGITIS: ICD-10-CM

## 2023-05-25 RX ORDER — AMLODIPINE BESYLATE 2.5 MG/1
TABLET ORAL
Qty: 90 TABLET | Refills: 1 | Status: SHIPPED | OUTPATIENT
Start: 2023-05-25 | End: 2024-01-10

## 2023-06-19 ENCOUNTER — TELEPHONE (OUTPATIENT)
Dept: FAMILY MEDICINE | Facility: CLINIC | Age: 88
End: 2023-06-19
Payer: COMMERCIAL

## 2023-06-19 NOTE — TELEPHONE ENCOUNTER
Pt needs refill of Senna 8.6 mg tablet to be sent to [facility] on [address] in [place].    Please call her or daughter at:[phone] when ready to .    Thank you!!

## 2023-06-21 DIAGNOSIS — K59.00 CONSTIPATION, UNSPECIFIED CONSTIPATION TYPE: ICD-10-CM

## 2023-06-21 RX ORDER — SENNOSIDES 8.6 MG
TABLET ORAL
Qty: 270 TABLET | Refills: 3 | Status: SHIPPED | OUTPATIENT
Start: 2023-06-21 | End: 2024-01-15

## 2023-06-21 NOTE — TELEPHONE ENCOUNTER
Patient walking into clinic requesting refill - routing HP to refills     Please call patient once rx is sent - thank you!     Karen Guidry RN  [facility]

## 2023-08-03 ENCOUNTER — PATIENT OUTREACH (OUTPATIENT)
Dept: GERIATRIC MEDICINE | Facility: CLINIC | Age: 88
End: 2023-08-03
Payer: COMMERCIAL

## 2023-08-03 NOTE — PROGRESS NOTES
[facility] Care Coordination Contact      [facility] Six-Month Telephone Assessment    6 month telephone assessment completed on 08/03/2023.    ER visits: Yes -  [facility]  Hospitalizations: No  TCU stays: No  Significant health status changes: None reported  Falls/Injuries: Yes: fall w/ laceration  ADL/IADL changes: No  Changes in services: No    Caregiver Assessment follow up: Completed w/ son    Goals: See POC in chart for goal progress documentation.      Will see member in 6 months for an annual health risk assessment.   Encouraged member to call CC with any questions or concerns in the meantime.     MARTA Lino  [facility]  [phone]  Fax: [phone]          [facility]

## 2023-08-18 ENCOUNTER — OFFICE VISIT (OUTPATIENT)
Dept: FAMILY MEDICINE | Facility: CLINIC | Age: 88
End: 2023-08-18
Payer: COMMERCIAL

## 2023-08-18 VITALS
DIASTOLIC BLOOD PRESSURE: 74 MMHG | SYSTOLIC BLOOD PRESSURE: 135 MMHG | BODY MASS INDEX: 30.62 KG/M2 | HEART RATE: 81 BPM | TEMPERATURE: 97.9 F | OXYGEN SATURATION: 93 % | RESPIRATION RATE: 20 BRPM | WEIGHT: 146.5 LBS

## 2023-08-18 DIAGNOSIS — S32.040D CLOSED COMPRESSION FRACTURE OF L4 LUMBAR VERTEBRA WITH ROUTINE HEALING, SUBSEQUENT ENCOUNTER: ICD-10-CM

## 2023-08-18 DIAGNOSIS — Z00.00 ENCOUNTER FOR ANNUAL WELLNESS EXAM IN MEDICARE PATIENT: Primary | ICD-10-CM

## 2023-08-18 DIAGNOSIS — I83.93 VARICOSE VEINS OF BOTH LOWER EXTREMITIES, UNSPECIFIED WHETHER COMPLICATED: ICD-10-CM

## 2023-08-18 DIAGNOSIS — I10 HYPERTENSION, UNSPECIFIED TYPE: ICD-10-CM

## 2023-08-18 LAB
ANION GAP SERPL CALCULATED.3IONS-SCNC: 10 MMOL/L (ref 7–15)
BASOPHILS # BLD AUTO: 0 10E3/UL (ref 0–0.2)
BASOPHILS NFR BLD AUTO: 1 %
BUN SERPL-MCNC: 15.2 MG/DL (ref 8–23)
CALCIUM SERPL-MCNC: 9.2 MG/DL (ref 8.2–9.6)
CHLORIDE SERPL-SCNC: 101 MMOL/L (ref 98–107)
CREAT SERPL-MCNC: 0.69 MG/DL (ref 0.51–0.95)
DEPRECATED HCO3 PLAS-SCNC: 28 MMOL/L (ref 22–29)
EOSINOPHIL # BLD AUTO: 0.2 10E3/UL (ref 0–0.7)
EOSINOPHIL NFR BLD AUTO: 5 %
ERYTHROCYTE [DISTWIDTH] IN BLOOD BY AUTOMATED COUNT: 11.6 % (ref 10–15)
GFR SERPL CREATININE-BSD FRML MDRD: 77 ML/MIN/1.73M2
GLUCOSE SERPL-MCNC: 106 MG/DL (ref 70–99)
HCT VFR BLD AUTO: 38.9 % (ref 35–47)
HGB BLD-MCNC: 12.6 G/DL (ref 11.7–15.7)
IMM GRANULOCYTES # BLD: 0 10E3/UL
IMM GRANULOCYTES NFR BLD: 0 %
LYMPHOCYTES # BLD AUTO: 1.4 10E3/UL (ref 0.8–5.3)
LYMPHOCYTES NFR BLD AUTO: 32 %
MCH RBC QN AUTO: 28.3 PG (ref 26.5–33)
MCHC RBC AUTO-ENTMCNC: 32.4 G/DL (ref 31.5–36.5)
MCV RBC AUTO: 87 FL (ref 78–100)
MONOCYTES # BLD AUTO: 0.4 10E3/UL (ref 0–1.3)
MONOCYTES NFR BLD AUTO: 9 %
NEUTROPHILS # BLD AUTO: 2.4 10E3/UL (ref 1.6–8.3)
NEUTROPHILS NFR BLD AUTO: 54 %
PLATELET # BLD AUTO: 184 10E3/UL (ref 150–450)
POTASSIUM SERPL-SCNC: 4.3 MMOL/L (ref 3.4–5.3)
RBC # BLD AUTO: 4.45 10E6/UL (ref 3.8–5.2)
SODIUM SERPL-SCNC: 139 MMOL/L (ref 136–145)
WBC # BLD AUTO: 4.4 10E3/UL (ref 4–11)

## 2023-08-18 PROCEDURE — 36415 COLL VENOUS BLD VENIPUNCTURE: CPT | Performed by: PHYSICIAN ASSISTANT

## 2023-08-18 PROCEDURE — 80048 BASIC METABOLIC PNL TOTAL CA: CPT | Performed by: PHYSICIAN ASSISTANT

## 2023-08-18 PROCEDURE — G0439 PPPS, SUBSEQ VISIT: HCPCS | Performed by: PHYSICIAN ASSISTANT

## 2023-08-18 PROCEDURE — 85025 COMPLETE CBC W/AUTO DIFF WBC: CPT | Performed by: PHYSICIAN ASSISTANT

## 2023-08-18 ASSESSMENT — ACTIVITIES OF DAILY LIVING (ADL)
CURRENT_FUNCTION: MEDICATION ADMINISTRATION REQUIRES ASSISTANCE
CURRENT_FUNCTION: TELEPHONE REQUIRES ASSISTANCE
CURRENT_FUNCTION: MONEY MANAGEMENT REQUIRES ASSISTANCE
CURRENT_FUNCTION: TRANSPORTATION REQUIRES ASSISTANCE
CURRENT_FUNCTION: BATHING REQUIRES ASSISTANCE
CURRENT_FUNCTION: SHOPPING REQUIRES ASSISTANCE
CURRENT_FUNCTION: PREPARING MEALS REQUIRES ASSISTANCE
CURRENT_FUNCTION: HOUSEWORK REQUIRES ASSISTANCE
CURRENT_FUNCTION: LAUNDRY REQUIRES ASSISTANCE

## 2023-08-18 ASSESSMENT — PAIN SCALES - GENERAL: PAINLEVEL: NO PAIN (0)

## 2023-08-18 NOTE — PROGRESS NOTES
"SUBJECTIVE:   Aracelis is a 89 year old who presents for Preventive Visit.    Here today with her daughter for her annual wellness visit.  Overall she is doing very well.  No concerns today about her health.  Feels quite supported by family as well as her apartment building.  Requesting refill on diclofenac as well as compression socks.  Requesting a handicap sticker.    No recent falls.  Mood stable.  Appetite normal.  Uses a cane however no concerns with her mobility.    Are you in the first 12 months of your Medicare coverage?  No    Healthy Habits:     In general, how would you rate your overall health?  Fair    Frequency of exercise:  None    Duration of exercise:  Less than 15 minutes    Do you usually eat at least 4 servings of fruit and vegetables a day, include whole grains    & fiber and avoid regularly eating high fat or \"junk\" foods?  Yes    Taking medications regularly:  Yes    Barriers to taking medications:  Not applicable    Medication side effects:  Not applicable    Ability to successfully perform activities of daily living:  Telephone requires assistance, transportation requires assistance, shopping requires assistance, preparing meals requires assistance, housework requires assistance, bathing requires assistance, laundry requires assistance, medication administration requires assistance and money management requires assistance    Home Safety:  No safety concerns identified    Hearing Impairment:  No hearing concerns    In the past 6 months, have you been bothered by leaking of urine? Yes    In general, how would you rate your overall mental or emotional health?  Good    Have you ever done Advance Care Planning? (For example, a Health Directive, POLST, or a discussion with a medical provider or your loved ones about your wishes):     Fall risk  Fallen 2 or more times in the past year?: Yes  Any fall with injury in the past year?: Yes  Timed Up and Go Test (>13.5 is fall risk; contact physician) : " 13  click delete button to remove this line now  Cognitive Screening     Do you have sleep apnea, excessive snoring or daytime drowsiness? : yes    Reviewed and updated as needed this visit by clinical staff    Allergies  Meds              Reviewed and updated as needed this visit by Provider    Allergies  Meds             Social History     Tobacco Use     Smoking status: Never     Smokeless tobacco: Never   Substance Use Topics     Alcohol use: No             11/25/2020     2:58 PM   Alcohol Use   Prescreen: >3 drinks/day or >7 drinks/week? No       Do you have a current opioid prescription? No  Do you use any other controlled substances or medications that are not prescribed by a provider? None      Current providers sharing in care for this patient include:  Patient Care Team:  Ervin Ledesma PA-C as PCP - General (Physician Assistant - Medical)  Flora Luis LSW as Lead Care Coordinator (Primary Care - CC)  Kendra Lang, PharmD as Pharmacist (Pharmacist)  Ervin Ledesma PA-C as Assigned PCP    The following health maintenance items are reviewed in Epic and correct as of today:  Health Maintenance   Topic Date Due     ZOSTER IMMUNIZATION (2 of 3) 10/23/2013     COVID-19 Vaccine (6 - Pfizer series) 02/02/2023     PHQ-9  08/10/2023     MEDICARE ANNUAL WELLNESS VISIT  08/17/2023     INFLUENZA VACCINE (1) 09/01/2023     ANNUAL REVIEW OF HM ORDERS  11/09/2023     DTAP/TDAP/TD IMMUNIZATION (2 - Td or Tdap) 01/08/2024     FALL RISK ASSESSMENT  08/18/2024     ADVANCE CARE PLANNING  08/17/2027     Pneumococcal Vaccine: 65+ Years  Completed     IPV IMMUNIZATION  Aged Out     MENINGITIS IMMUNIZATION  Aged Out     Lab work is in process  Labs reviewed in EPIC  BP Readings from Last 3 Encounters:   08/18/23 135/74   04/13/23 (!) 146/78   03/08/23 125/75    Wt Readings from Last 3 Encounters:   08/18/23 66.5 kg (146 lb 8 oz)   03/08/23 66 kg (145 lb 9.6 oz)   11/15/22 66.7 kg (147 lb)                   Patient Active Problem List   Diagnosis     Insomnia, unspecified type     Hypertension, unspecified type     Gastroesophageal reflux disease without esophagitis     Osteoporosis without current pathological fracture, unspecified osteoporosis type     Chronic cough     Constipation, unspecified constipation type     Depression, unspecified depression type     Falls frequently     Poor balance     Varicose veins of both lower extremities, unspecified whether complicated     Closed compression fracture of L4 vertebra (H)     Hyponatremia     Abdominal pain of unknown etiology     Intractable vomiting with nausea, unspecified vomiting type     Gastritis     Past Surgical History:   Procedure Laterality Date     CHOLECYSTECTOMY       RELEASE CARPAL TUNNEL Right 6/13/2016       Social History     Tobacco Use     Smoking status: Never     Smokeless tobacco: Never   Substance Use Topics     Alcohol use: No     Family History   Problem Relation Age of Onset     Unknown/Adopted Mother      Unknown/Adopted Father          Current Outpatient Medications   Medication Sig Dispense Refill     acetaminophen (TYLENOL) 325 MG tablet Take 325-650 mg by mouth every 4 hours as needed for mild pain Max acetaminophen dose: 4000 mg in 24 hrs       albuterol (2.5 MG/3ML) 0.083% neb solution Inhale 2.5 mg into the lungs every 4 hours as needed        ALPRAZolam (XANAX) 0.25 MG tablet Take 0.25 mg by mouth 3 times daily as needed for anxiety       amLODIPine (NORVASC) 2.5 MG tablet TAKE 1 TABLET BY MOUTH EVERY DAY 90 tablet 1     Ascorbic Acid (VITAMIN C/BIOFLAVONOIDS/ROSEHP PO) Take 1 tablet by mouth daily        aspirin (ASPIRIN LOW DOSE) 81 MG EC tablet TAKE 1 TABLET BY MOUTH EVERY DAY WITH FOOD 90 tablet 2     azelastine (OPTIVAR) 0.05 % SOLN ophthalmic solution Place 1 drop into both eyes 2 times daily       calcium carbonate-vitamin D (OSCAL) 500-5 MG-MCG tablet TAKE 1 TABLET BY MOUTH TWICE A DAY WITH MEALS 180 tablet 2      cholecalciferol (VITAMIN D3) 1000 UNIT tablet Take 1,000 Units by mouth daily        ciclopirox (PENLAC) 8 % external solution Apply to adjacent skin and affected nails daily.  Remove with alcohol every 7 days, then repeat. 6 mL 0     diclofenac (VOLTAREN) 1 % topical gel Apply 4 g topically 2 times daily 200 g 2     Efinaconazole 10 % SOLN Externally apply topically daily 8 mL 0     gabapentin (NEURONTIN) 100 MG capsule Take 1 capsule (100 mg) by mouth nightly as needed for neuropathic pain 60 capsule 1     guaiFENesin (MUCINEX) 600 MG 12 hr tablet Take 1,200 mg by mouth 2 times daily as needed for congestion       lidocaine (LIDODERM) 5 % patch PLACE 1 PATCH ONTO THE SKIN EVERY 24 HOURS TO PREVENT LIDOCAINE TOXICITY, PATIENT SHOULD BE PATCH FREE FOR 12 HRS DAILY. (Patient taking differently: Place onto the skin every 24 hours To prevent lidocaine toxicity, patient should be patch free for 12 hrs daily.) 7 patch 0     Multiple Vitamin (DAILY-NETTA MULTIVITAMIN) TABS TAKE 1 TABLET BY MOUTH EVERY DAY 90 tablet 2     Omega-3 Fatty Acids (OMEGA-3 FISH OIL) 1200 MG CAPS Take 1,200 mg by mouth daily        omeprazole (PRILOSEC) 20 MG DR capsule TAKE 1 CAPSULE BY MOUTH EVERY DAY BEFORE A MEAL 90 capsule 3     order for DME WALKER; with wheels, and brakes, and a seat. 1 Device 0     order for DME Equipment being ordered:  Compression stockings,knee high,mild compression 3 Units 3     order for DME Knee high compression stockings.  Light compression.  16-20mm Hg.  Ok 3 pairs.       OVER-THE-COUNTER 550 mg 3 times daily as needed (Takes 3x daily when senokot isn't available or if senokot doesn't work for her.)       polyethylene glycol (MIRALAX) 17 GM/Dose powder Take 17 g by mouth daily as needed for constipation 510 g 0     sennosides (SENOKOT) 8.6 MG tablet TAKE 3 TABLETS BY MOUTH EVERY  tablet 3     sertraline (ZOLOFT) 50 MG tablet TAKE 1 TABLET BY MOUTH EVERY DAY 90 tablet 3     No Known Allergies  Recent Labs  "  Lab Test 08/18/23  1446 08/17/22  1030 02/16/22  1019 02/16/22  1019 05/22/21  0632 05/21/21 2008 11/25/20  1524 12/26/19  1732 08/20/19  1558 08/01/18  1056   LDL  --   --   --   --   --   --   --   --  139*  --    HDL  --   --   --   --   --   --   --   --  47*  --    TRIG  --   --   --   --   --   --   --   --  136  --    ALT  --   --   --   --   --  18 16  --  18 18   CR 0.69 0.64   < > 0.76 0.54 0.61 0.84   < > 0.72 0.66   GFRESTIMATED 77 78   < > 70 78 75 58*   < > 70 84   GFRESTBLACK  --   --   --   --  >90 87 67   < > 81 >90   POTASSIUM 4.3 4.3   < > 4.7 3.8 3.7 4.4   < > 4.3 4.1   TSH  --   --   --  2.38  --   --   --   --   --  1.59    < > = values in this interval not displayed.     Pertinent mammograms are reviewed under the imaging tab.    Review of Systems  Constitutional, HEENT, cardiovascular, pulmonary, GI, , musculoskeletal, neuro, skin, endocrine and psych systems are negative, except as otherwise noted.    OBJECTIVE:   /74 (BP Location: Left arm, Patient Position: Sitting, Cuff Size: Adult Regular)   Pulse 81   Temp 97.9  F (36.6  C) (Temporal)   Resp 20   Wt 66.5 kg (146 lb 8 oz)   SpO2 93%   BMI 30.62 kg/m   Estimated body mass index is 30.62 kg/m  as calculated from the following:    Height as of 3/8/23: 1.473 m (4' 10\").    Weight as of this encounter: 66.5 kg (146 lb 8 oz).  Physical Exam  GENERAL: healthy, alert and no distress  EYES: Eyes grossly normal to inspection, PERRL and conjunctivae and sclerae normal  HENT: ear canals and TM's normal, nose and mouth without ulcers or lesions  NECK: no adenopathy, no asymmetry, masses, or scars and thyroid normal to palpation  RESP: lungs clear to auscultation - no rales, rhonchi or wheezes  CV: regular rate and rhythm, normal S1 S2, no S3 or S4, no murmur, click or rub, no peripheral edema and peripheral pulses strong  ABDOMEN: soft, nontender, no hepatosplenomegaly, no masses and bowel sounds normal  MS: no gross musculoskeletal " "defects noted, no edema  SKIN: no suspicious lesions or rashes  NEURO: Normal strength and tone, mentation intact and speech normal  PSYCH: mentation appears normal, affect normal/bright    ASSESSMENT / PLAN:   Aracelis was seen today for physical.    Diagnoses and all orders for this visit:    Encounter for annual wellness exam in Medicare patient  Basic labs today.  Doing very well.  Follow-up 6 months-12 months.  Handicap sticker provided today.  Compression stocks order provided.  Visit today [facility] with [facility] .      Hypertension, unspecified type  -     CBC with platelets and differential  -     Basic metabolic panel  (Ca, Cl, CO2, Creat, Gluc, K, Na, BUN)    Closed compression fracture of L4 lumbar vertebra with routine healing, subsequent encounter  -     diclofenac (VOLTAREN) 1 % topical gel; Apply 4 g topically 2 times daily    Varicose veins of both lower extremities, unspecified whether complicated  -     Compression Sleeve/Stocking Order for DME - ONLY FOR DME        Patient has been advised of split billing requirements and indicates understanding: Yes      COUNSELING:  Reviewed preventive health counseling, as reflected in patient instructions      BMI:   Estimated body mass index is 30.62 kg/m  as calculated from the following:    Height as of 3/8/23: 1.473 m (4' 10\").    Weight as of this encounter: 66.5 kg (146 lb 8 oz).   Weight management plan: Discussed healthy diet and exercise guidelines      She reports that she has never smoked. She has never used smokeless tobacco.      Appropriate preventive services were discussed with this patient, including applicable screening as appropriate for cardiovascular disease, diabetes, osteopenia/osteoporosis, and glaucoma.  As appropriate for age/gender, discussed screening for colorectal cancer, prostate cancer, breast cancer, and cervical cancer. Checklist reviewing preventive services available has been given to the patient.    Reviewed patients plan " of care and provided an AVS. The Basic Care Plan (routine screening as documented in Health Maintenance) for Aracelis meets the Care Plan requirement. This Care Plan has been established and reviewed with the Patient and daughter.          The likelihood of other entities in the differential is insufficient to justify any further testing for them at this time. This was explained to the patient. The patient was advised that persistent or worsening symptoms would require further evaluation. Patient advised to call the office and if unable to reach to go to the emergency room if they develop any new or worsening symptoms. Expressed understanding and agreement with above stated plan.         JIMMY Guadarrama [facility]

## 2023-08-18 NOTE — LETTER
[facility]  [address] [facility]  [address]  [place]  [address]  Tel: [phone]    August 24, 2023    Aracelis Blakely  [address]   [place] [address]        Dear Ms. Marciano Blakely,    It was nice seeing you for your annual wellness visit!     - Normal CBC (white blood cells, hemoglobin and platelets)     - Normal electrolytes, kidney function     Please let me know if you have any concerns.     Ervin Barney PA-C [facility]        Enclosure: Lab Results  Results for orders placed or performed in visit on 08/18/23   Basic metabolic panel  (Ca, Cl, CO2, Creat, Gluc, K, Na, BUN)     Status: Abnormal   Result Value Ref Range    Sodium 139 136 - 145 mmol/L    Potassium 4.3 3.4 - 5.3 mmol/L    Chloride 101 98 - 107 mmol/L    Carbon Dioxide (CO2) 28 22 - 29 mmol/L    Anion Gap 10 7 - 15 mmol/L    Urea Nitrogen 15.2 8.0 - 23.0 mg/dL    Creatinine 0.69 0.51 - 0.95 mg/dL    Calcium 9.2 8.2 - 9.6 mg/dL    Glucose 106 (H) 70 - 99 mg/dL    GFR Estimate 77 >60 mL/min/1.73m2   CBC with platelets and differential     Status: None   Result Value Ref Range    WBC Count 4.4 4.0 - 11.0 10e3/uL    RBC Count 4.45 3.80 - 5.20 10e6/uL    Hemoglobin 12.6 11.7 - 15.7 g/dL    Hematocrit 38.9 35.0 - 47.0 %    MCV 87 78 - 100 fL    MCH 28.3 26.5 - 33.0 pg    MCHC 32.4 31.5 - 36.5 g/dL    RDW 11.6 10.0 - 15.0 %    Platelet Count 184 150 - 450 10e3/uL    % Neutrophils 54 %    % Lymphocytes 32 %    % Monocytes 9 %    % Eosinophils 5 %    % Basophils 1 %    % Immature Granulocytes 0 %    Absolute Neutrophils 2.4 1.6 - 8.3 10e3/uL    Absolute Lymphocytes 1.4 0.8 - 5.3 10e3/uL    Absolute Monocytes 0.4 0.0 - 1.3 10e3/uL    Absolute Eosinophils 0.2 0.0 - 0.7 10e3/uL    Absolute Basophils 0.0 0.0 - 0.2 10e3/uL    Absolute Immature Granulocytes 0.0 <=0.4 10e3/uL   CBC with platelets and differential     Status: None    Narrative    The following orders were created for  panel order CBC with platelets and differential.  Procedure                               Abnormality         Status                     ---------                               -----------         ------                     CBC with platelets and d...[[id number]]                      Final result                 Please view results for these tests on the individual orders.

## 2023-08-19 NOTE — RESULT ENCOUNTER NOTE
Kitr Hsu,     It was nice seeing you for your annual wellness visit!     - Normal CBC (white blood cells, hemoglobin and platelets)    - Normal electrolytes, kidney function    Please let me know if you have any concerns.    Best,    Ervin Ledesma PA-C  [facility]

## 2023-10-13 ENCOUNTER — TELEPHONE (OUTPATIENT)
Dept: FAMILY MEDICINE | Facility: CLINIC | Age: 88
End: 2023-10-13
Payer: COMMERCIAL

## 2023-10-13 NOTE — TELEPHONE ENCOUNTER
Pt and spouse calling to verify where Senna was sent:     Confirmed was sent to the [facility] [address] Refills Start End YUKI   sennosides (SENOKOT) 8.6 MG tablet 270 tablet 3 6/21/2023  No   Sig: TAKE 3 TABLETS BY MOUTH EVERY DAY   Sent to pharmacy as: Senna 8.6 MG Oral Tablet (SENOKOT)   Class: E-Prescribe   Notes to Pharmacy: DX Code Needed  .   Order: [id number]   E-Prescribing Status: Receipt confirmed by pharmacy (6/21/2023 11:08 AM CDT)   Renewals    Renewal provider: Truman Gordon MD        Printout Tracking    External Result Report     Medication Administration Instructions    TAKE 3 TABLETS BY MOUTH EVERY DAY     Pharmacy    [facility] [address] [facility] - [place], [place] - [address]

## 2023-11-13 ENCOUNTER — PATIENT OUTREACH (OUTPATIENT)
Dept: GERIATRIC MEDICINE | Facility: CLINIC | Age: 88
End: 2023-11-13
Payer: COMMERCIAL

## 2023-11-13 NOTE — PROGRESS NOTES
Received message from [facility] Alert ([phone]) sharing that they received a call from the son in-law that Aracleis lost her lifeline pendant over the weekend while out in the community with family. Lif[employer] is can replace the pendant for a one-time cost of $350.00.    Flora Luis MARÍA  [employer]  [phone]  Fax: [phone]

## 2023-11-21 ENCOUNTER — PATIENT OUTREACH (OUTPATIENT)
Dept: GERIATRIC MEDICINE | Facility: CLINIC | Age: 88
End: 2023-11-21
Payer: COMMERCIAL

## 2023-11-21 NOTE — PROGRESS NOTES
[facility] Care Coordination Contact    Received task from care coordinator.  Completed following tasks: Submitted referrals/auths for Per Pendant replacement and Updated services in Database    Yuliet Dailey  Care Management Specialist  [facility]  [phone]

## 2024-01-03 ENCOUNTER — PATIENT OUTREACH (OUTPATIENT)
Dept: GERIATRIC MEDICINE | Facility: CLINIC | Age: 89
End: 2024-01-03
Payer: COMMERCIAL

## 2024-01-03 NOTE — PROGRESS NOTES
[facility] Care Coordination Contact    Called adult daughter Yoselin  to schedule annual HRA home visit. HRA has been scheduled for Wed 01/10/24 at 10:30am.  Called Ana Laura Ortega and scheduled an  for the home visit.    MARTA Lino  [facility]  [phone]  Fax: [phone]

## 2024-01-04 ENCOUNTER — PATIENT OUTREACH (OUTPATIENT)
Dept: GERIATRIC MEDICINE | Facility: CLINIC | Age: 89
End: 2024-01-04
Payer: COMMERCIAL

## 2024-01-04 NOTE — PROGRESS NOTES
[facility] Care Coordination Contact    Member became effective with [facility] on 1/1/2024 with [employer].  Previous Health Plan: [facility]  Previous Care System:  [facility]  Previous care coordinators name and number: MARTA Lino  Waiver Type: EW  UTF received: No UTF to request  Mailed welcome letter and Mailed [employer] Behind document  Address/Phone discrepancy: N/A  [employer]: Yes    Yuliet Dailey  Care Management Specialist  [facility]  [phone]

## 2024-01-04 NOTE — LETTER
January 4, 2024    Important [employer] Information    ARACELIS KENNEDY JOE  [address]   [facility] [address]  A Partner in Your Care  Dear Aracelis,  Thank you for choosing [employer] for your health plan coverage! I am excited to welcome you as a member of [email or web address] .  My name is MARTA Lino and I will be working with you as your Care Coordinator. Moultrie Duriana partners with [employer] to provide members with Care Coordination services.  As your Care Coordinator, I can:  Work with you to create a Care Plan to keep you healthy and safe  Help you make appointments to see health care providers   Support you and your family in making health care decisions  Find community services that may interest you  Identify health benefits you are eligible for  What happens next?  To get started, I will call you. I ll ask you a few questions about your health and schedule a time to meet. You will have a chance to ask me questions, too.  Questions?  Call me at [phone] Monday-Friday between 8am and 5pm. TTY: [id number]. I look forward to speaking with you soon.  Sincerely,    MARTA Lino    E-mail: [email or web address]  Phone: [phone]      [facility]iana    cc: member records                                                                                        _

## 2024-01-08 DIAGNOSIS — I10 HYPERTENSION, UNSPECIFIED TYPE: ICD-10-CM

## 2024-01-08 NOTE — TELEPHONE ENCOUNTER
Pt needs refill of:Amlodipine Besylate 2.5 mg.    Her Pharmacy is [facility] [address] in [place].    Please call:[phone] when it is ready for .

## 2024-01-10 ENCOUNTER — PATIENT OUTREACH (OUTPATIENT)
Dept: GERIATRIC MEDICINE | Facility: CLINIC | Age: 89
End: 2024-01-10
Payer: COMMERCIAL

## 2024-01-10 DIAGNOSIS — I10 HYPERTENSION, UNSPECIFIED TYPE: Chronic | ICD-10-CM

## 2024-01-10 DIAGNOSIS — G47.00 INSOMNIA, UNSPECIFIED TYPE: Primary | ICD-10-CM

## 2024-01-10 RX ORDER — CALCIUM CARBONATE 750 MG/1
750 TABLET, CHEWABLE ORAL PRN
COMMUNITY

## 2024-01-10 RX ORDER — LANOLIN ALCOHOL/MO/W.PET/CERES
1000 CREAM (GRAM) TOPICAL DAILY
COMMUNITY

## 2024-01-10 NOTE — TELEPHONE ENCOUNTER
Pended refill to preferred pharmacy - routing to refill pool     Karen Guidry, RN  [facility]

## 2024-01-11 RX ORDER — AMLODIPINE BESYLATE 2.5 MG/1
2.5 TABLET ORAL DAILY
Qty: 90 TABLET | Refills: 1 | Status: ON HOLD | OUTPATIENT
Start: 2024-01-11 | End: 2024-06-07

## 2024-01-11 ASSESSMENT — SOCIAL DETERMINANTS OF HEALTH (SDOH)
IN A TYPICAL WEEK, HOW MANY TIMES DO YOU TALK ON THE PHONE WITH FAMILY, FRIENDS, OR NEIGHBORS?: MORE THAN THREE TIMES A WEEK

## 2024-01-11 ASSESSMENT — LIFESTYLE VARIABLES
HOW OFTEN DO YOU HAVE A DRINK CONTAINING ALCOHOL: NEVER
HOW OFTEN DO YOU HAVE SIX OR MORE DRINKS ON ONE OCCASION: NEVER
HOW MANY STANDARD DRINKS CONTAINING ALCOHOL DO YOU HAVE ON A TYPICAL DAY: PATIENT DOES NOT DRINK
AUDIT-C TOTAL SCORE: 0
SKIP TO QUESTIONS 9-10: 1

## 2024-01-11 NOTE — PROGRESS NOTES
[facility] Care Coordination Contact    [facility] Home Visit Assessment     Home visit for Health Risk Assessment with Aracelis Blakely completed on January 10, 2024    Type of residence:: Apartment - handicap accessible  Current living arrangement:: I live alone   Assessment completed with:: Patient, Other, Children ()    Current Care Plan  Member currently receiving the following home care services: NA    Member currently receiving the following community resources: County Programs, DME, County Worker, Lifeline, PCA, Transportation Services    Medication Review  Medication reconciliation completed in Epic: Yes  Medication set-up & administration: Family/informal caregiver sets up weekly.  Family caregiver administers medications.  Medication Risk Assessment Medication (1 or more, place referral to MTM): Recent falls within past year and Taking 1 or more high-risk medications for adults >65 years  MTM Referral Placed: Yes: MTM Referral Placed    Mental/Behavioral Health   Depression Screening:   PHQ-2 Total Score (Adult) - Positive if 3 or more points; Administer PHQ-9 if positive: 0       Mental health DX:: Yes   Mental health DX how managed:: Medication    Falls Assessment:   Fallen 2 or more times in the past year?: No   Any fall with injury in the past year?: No    ADL/IADL Dependencies:   Dependent ADLs:: Ambulation-cane, Ambulation-walker, Bathing, Dressing, Grooming, Eating, Incontinence, Transfers, Toileting  Dependent IADLs:: Cleaning, Cooking, Laundry, Shopping, Medication Management, Money Management, Meal Preparation, Incontinence, Transportation    Health Plan sponsored benefits: [facility] [employer] [facility] on 01/01/2024. Reviewed health plan change during annual assessment. [facility]: Shared information regarding One [employer] Fitness Program. Reviewed preventative health screening and health plan supplemental benefits/incentives. Reviewed medication  disposal form.    PCA Assessment completed at visit: Yes Annual PCA assessment indicated 11 hours per day of PCA. This is the same as the previous assessment.     Elderly Waiver Eligibility: Yes-will continue on EW    Care Plan & Recommendations: Taking a lot of medications. Would like an MTM referral to review medications to see if all medications are appropriate or if any can be discontinued. No other concerns.    See [facility] for detailed assessment information.    Follow-Up Plan: Member informed of future contact, plan to f/u with member with a 6 month telephone assessment.  Contact information shared with member and family, encouraged member to call with any questions or concerns at any time.    [place] care continuum providers: Please see Snapshot and Care Management Flowsheets for Specific details of care plan.    This CC note routed to PCP, Ervin Ledesma.    MARTA Lino  [facility]  [phone]  Fax: [phone]

## 2024-01-11 NOTE — TELEPHONE ENCOUNTER
Prescription approved per [facility] Refill Protocol.  Sendy Larson, RN  [facility] Triage Nurse

## 2024-01-15 ENCOUNTER — TELEPHONE (OUTPATIENT)
Dept: FAMILY MEDICINE | Facility: CLINIC | Age: 89
End: 2024-01-15
Payer: COMMERCIAL

## 2024-01-15 DIAGNOSIS — F32.A DEPRESSION, UNSPECIFIED DEPRESSION TYPE: ICD-10-CM

## 2024-01-15 DIAGNOSIS — I10 HYPERTENSION, UNSPECIFIED TYPE: ICD-10-CM

## 2024-01-15 DIAGNOSIS — K21.9 GASTROESOPHAGEAL REFLUX DISEASE WITHOUT ESOPHAGITIS: ICD-10-CM

## 2024-01-15 DIAGNOSIS — K59.00 CONSTIPATION, UNSPECIFIED CONSTIPATION TYPE: ICD-10-CM

## 2024-01-15 DIAGNOSIS — M81.0 OSTEOPOROSIS WITHOUT CURRENT PATHOLOGICAL FRACTURE, UNSPECIFIED OSTEOPOROSIS TYPE: ICD-10-CM

## 2024-01-15 RX ORDER — SENNOSIDES 8.6 MG
TABLET ORAL
Qty: 270 TABLET | Refills: 1 | Status: SHIPPED | OUTPATIENT
Start: 2024-01-15 | End: 2024-06-13

## 2024-01-15 RX ORDER — ASPIRIN 81 MG/1
TABLET ORAL
Qty: 90 TABLET | Refills: 1 | Status: SHIPPED | OUTPATIENT
Start: 2024-01-15 | End: 2024-07-30

## 2024-01-15 NOTE — TELEPHONE ENCOUNTER
MTM referral from: [facility] visit (referral by provider)    MTM referral outreach attempt #2 on January 15, 2024 at 12:56 PM      Outcome: Patient is not interested at this time because she has an appointment with pcp in Feb, will route to MTM Pharmacist/Provider as an FYI. Thank you for the referral.     Use need ins info for the carrier/Plan on the flowsheet        Alyson Pineda CPhT  MT

## 2024-01-18 ENCOUNTER — PATIENT OUTREACH (OUTPATIENT)
Dept: GERIATRIC MEDICINE | Facility: CLINIC | Age: 89
End: 2024-01-18
Payer: COMMERCIAL

## 2024-01-18 NOTE — LETTER
January 18, 2024    Important [employer] Information    ARACELIS KENNEDY JOE  [address]   [place] [address]  Your Care Plan  Dear Aracelis,  When we spoke recently, I promised to send you a Care Plan. The plan enclosed is a summary of our discussion. It includes the steps we agreed would help you meet your health goals. In addition, I can help you with:  [id number]  This program is available to members who need a ride to medical and dental visits. To schedule a ride, call [phone] or [phone] (toll free). TTY: 711. You can call Monday - Thursday 8 a.m. to 5 p.m. and Fridays 9 a.m. to 5 p.m.  One Pass  One Pass is your no-cost, complete, fitness solution for your mind and body. To learn more visit [employer]fitness or call One Pass, toll-free [phone] (TTY: 711) 8 a.m. to 9 p.m. Monday-Friday.  Health Care Directive   This form helps you outline your health care wishes. You can request a form from me and I will answer any questions you have before you discuss it with your doctor.   Annual Physical  Take a key step on your path to good health and set up an annual physical at your clinic.  Questions?  Call me at [phone] Monday-Friday between 8am and 5pm.  TTY: 711. As we discussed, I plan to be in touch with you again in 6 months to follow up via phone.  Sincerely,    MARTA Lino    E-mail: [email or web address]  Phone: [phone]      [facility]    cc: member records

## 2024-01-18 NOTE — PROGRESS NOTES
[facility] Care Coordination Contact    Received after visit chart from care coordinator.  Completed following tasks: Mailed copy of care plan/support plan to member, Mailed [place] Choices signature sheet pages 3-4, Mailed Safe Medication Disposal , Mailed [employer] Leave Behind Letter, Submitted referrals/auths for pca, and Updated services in Database  , Provider Signature - No POC Shared:  Member indicates that they do not want their POC shared with any EW providers.    , and [employer]:  Faxed completed PCA assessment to PCA Agency and mailed copies to member.  Faxed MD Communication to PCP.  Emailed referral form for auth to [employer].    Yuliet Dailey  Care Management Specialist  [facility]  [phone]

## 2024-01-26 ENCOUNTER — DOCUMENTATION ONLY (OUTPATIENT)
Dept: OTHER | Facility: CLINIC | Age: 89
End: 2024-01-26
Payer: COMMERCIAL

## 2024-02-02 ENCOUNTER — PATIENT OUTREACH (OUTPATIENT)
Dept: GERIATRIC MEDICINE | Facility: CLINIC | Age: 89
End: 2024-02-02
Payer: COMMERCIAL

## 2024-02-02 NOTE — PROGRESS NOTES
Received notification from [facility] that the PCA provider [facility] ((P) [phone]) is not contracted with [facility]. [facility] will authorize 120 days of PCA through [facility] to allow client to find a new PCA provider contracted with [facility].    Daughter Yoselin is the paid PCA. LM for Yoselin to call CC re: PCA provider and insurance coverage.     Requested CMS to update auth to reflect 120 days of PCA through [facility] per [employer]'s request. Auth: 02/01/24-03/31/24.    Flora Luis, MARÍA  [facility]  [phone]  Fax: [phone]

## 2024-02-02 NOTE — PROGRESS NOTES
Received task from care coordinator.  Completed following tasks:  Per CC updates dates on Auth for PCA 2/1/24-3/31/2024 and also sent auth for PCA 1/1/24-1/31/24. Updated MICHAELLE Dailey  Care Management Specialist  [facility]  [phone]

## 2024-02-14 DIAGNOSIS — M81.0 OSTEOPOROSIS WITHOUT CURRENT PATHOLOGICAL FRACTURE, UNSPECIFIED OSTEOPOROSIS TYPE: ICD-10-CM

## 2024-02-15 RX ORDER — MULTIVITAMIN WITH FOLIC ACID 400 MCG
1 TABLET ORAL DAILY
Qty: 90 TABLET | Refills: 2 | Status: SHIPPED | OUTPATIENT
Start: 2024-02-15 | End: 2024-02-18

## 2024-02-18 ENCOUNTER — MYC REFILL (OUTPATIENT)
Dept: FAMILY MEDICINE | Facility: CLINIC | Age: 89
End: 2024-02-18
Payer: COMMERCIAL

## 2024-02-18 DIAGNOSIS — M81.0 OSTEOPOROSIS WITHOUT CURRENT PATHOLOGICAL FRACTURE, UNSPECIFIED OSTEOPOROSIS TYPE: ICD-10-CM

## 2024-02-19 RX ORDER — MULTIVITAMIN WITH FOLIC ACID 400 MCG
1 TABLET ORAL DAILY
Qty: 90 TABLET | Refills: 2 | Status: SHIPPED | OUTPATIENT
Start: 2024-02-19 | End: 2024-02-29

## 2024-02-29 DIAGNOSIS — M81.0 OSTEOPOROSIS WITHOUT CURRENT PATHOLOGICAL FRACTURE, UNSPECIFIED OSTEOPOROSIS TYPE: ICD-10-CM

## 2024-02-29 RX ORDER — MULTIVITAMIN WITH FOLIC ACID 400 MCG
1 TABLET ORAL DAILY
Qty: 90 TABLET | Refills: 2 | OUTPATIENT
Start: 2024-02-29

## 2024-02-29 RX ORDER — MULTIVITAMIN WITH FOLIC ACID 400 MCG
1 TABLET ORAL DAILY
Qty: 90 TABLET | Refills: 2 | Status: SHIPPED | OUTPATIENT
Start: 2024-02-29

## 2024-02-29 NOTE — PROGRESS NOTES
Resent pca auth to [employer] per CC    Yuliet Dailey  Care Management Specialist  [facility]  [phone]

## 2024-02-29 NOTE — PROGRESS NOTES
Have had several previous conversations with [facility] that they are contracted with [facility] and were going to work with [employer] to confirm contract was up to date. Spoke w/ a [employer] representative today who confirmed that [facility] is contracted for PCA. Requested for CMS to submit a new PCA auth for dates 02/01/24-01/31/25. Updated Son in-law via email.    Flora Luis, [facility]  [phone]  Fax: [phone]

## 2024-03-07 ENCOUNTER — MEDICAL CORRESPONDENCE (OUTPATIENT)
Dept: HEALTH INFORMATION MANAGEMENT | Facility: CLINIC | Age: 89
End: 2024-03-07

## 2024-03-07 PROBLEM — N39.42 URINARY INCONTINENCE WITHOUT SENSORY AWARENESS: Status: ACTIVE | Noted: 2024-03-07

## 2024-03-11 DIAGNOSIS — S32.040D CLOSED COMPRESSION FRACTURE OF L4 LUMBAR VERTEBRA WITH ROUTINE HEALING, SUBSEQUENT ENCOUNTER: ICD-10-CM

## 2024-03-13 ENCOUNTER — ANCILLARY PROCEDURE (OUTPATIENT)
Dept: GENERAL RADIOLOGY | Facility: CLINIC | Age: 89
End: 2024-03-13
Attending: PHYSICIAN ASSISTANT
Payer: COMMERCIAL

## 2024-03-13 ENCOUNTER — OFFICE VISIT (OUTPATIENT)
Dept: FAMILY MEDICINE | Facility: CLINIC | Age: 89
End: 2024-03-13
Payer: COMMERCIAL

## 2024-03-13 VITALS
HEART RATE: 91 BPM | BODY MASS INDEX: 29.2 KG/M2 | RESPIRATION RATE: 16 BRPM | TEMPERATURE: 99.1 F | SYSTOLIC BLOOD PRESSURE: 120 MMHG | DIASTOLIC BLOOD PRESSURE: 75 MMHG | OXYGEN SATURATION: 94 % | HEIGHT: 58 IN | WEIGHT: 139.1 LBS

## 2024-03-13 DIAGNOSIS — R06.2 WHEEZING: ICD-10-CM

## 2024-03-13 DIAGNOSIS — M79.89 LEG SWELLING: ICD-10-CM

## 2024-03-13 DIAGNOSIS — F32.A DEPRESSION, UNSPECIFIED DEPRESSION TYPE: ICD-10-CM

## 2024-03-13 DIAGNOSIS — R06.2 WHEEZING: Primary | ICD-10-CM

## 2024-03-13 DIAGNOSIS — I10 HYPERTENSION, UNSPECIFIED TYPE: ICD-10-CM

## 2024-03-13 LAB
BASOPHILS # BLD AUTO: 0 10E3/UL (ref 0–0.2)
BASOPHILS NFR BLD AUTO: 0 %
EOSINOPHIL # BLD AUTO: 0.2 10E3/UL (ref 0–0.7)
EOSINOPHIL NFR BLD AUTO: 3 %
ERYTHROCYTE [DISTWIDTH] IN BLOOD BY AUTOMATED COUNT: 11.9 % (ref 10–15)
HCT VFR BLD AUTO: 41.1 % (ref 35–47)
HGB BLD-MCNC: 13.1 G/DL (ref 11.7–15.7)
IMM GRANULOCYTES # BLD: 0 10E3/UL
IMM GRANULOCYTES NFR BLD: 0 %
LYMPHOCYTES # BLD AUTO: 1.3 10E3/UL (ref 0.8–5.3)
LYMPHOCYTES NFR BLD AUTO: 24 %
MCH RBC QN AUTO: 27.9 PG (ref 26.5–33)
MCHC RBC AUTO-ENTMCNC: 31.9 G/DL (ref 31.5–36.5)
MCV RBC AUTO: 87 FL (ref 78–100)
MONOCYTES # BLD AUTO: 0.5 10E3/UL (ref 0–1.3)
MONOCYTES NFR BLD AUTO: 10 %
NEUTROPHILS # BLD AUTO: 3.4 10E3/UL (ref 1.6–8.3)
NEUTROPHILS NFR BLD AUTO: 64 %
PLATELET # BLD AUTO: 194 10E3/UL (ref 150–450)
RBC # BLD AUTO: 4.7 10E6/UL (ref 3.8–5.2)
WBC # BLD AUTO: 5.3 10E3/UL (ref 4–11)

## 2024-03-13 PROCEDURE — 85025 COMPLETE CBC W/AUTO DIFF WBC: CPT | Performed by: PHYSICIAN ASSISTANT

## 2024-03-13 PROCEDURE — 80048 BASIC METABOLIC PNL TOTAL CA: CPT | Performed by: PHYSICIAN ASSISTANT

## 2024-03-13 PROCEDURE — 71046 X-RAY EXAM CHEST 2 VIEWS: CPT | Mod: TC | Performed by: RADIOLOGY

## 2024-03-13 PROCEDURE — 36415 COLL VENOUS BLD VENIPUNCTURE: CPT | Performed by: PHYSICIAN ASSISTANT

## 2024-03-13 PROCEDURE — 99214 OFFICE O/P EST MOD 30 MIN: CPT | Performed by: PHYSICIAN ASSISTANT

## 2024-03-13 ASSESSMENT — PATIENT HEALTH QUESTIONNAIRE - PHQ9: SUM OF ALL RESPONSES TO PHQ QUESTIONS 1-9: 1

## 2024-03-13 ASSESSMENT — PAIN SCALES - GENERAL: PAINLEVEL: NO PAIN (0)

## 2024-03-13 NOTE — PROGRESS NOTES
"Assessment & Plan     Wheezing  Unspecified wheezing.  Feels well overall.  No associated URI symptoms or cough.  Lungs otherwise clear to auscultation aside wheezing in bilateral upper and middle airways.  Chest x-ray today.  Close follow-up with new or worsening symptoms.  Diligent use of albuterol nebulizer.    - XR Chest 2 Views  - Basic metabolic panel  (Ca, Cl, CO2, Creat, Gluc, K, Na, BUN)  - CBC with platelets and differential    Leg swelling  Additional orders sent for compression stockings which she gets great benefit from.  - Compression Sleeve/Stocking Order for DME - ONLY FOR DME    Hypertension, unspecified type  Labs today.  Blood pressure well-controlled on amlodipine 2.5 mg.  - Basic metabolic panel  (Ca, Cl, CO2, Creat, Gluc, K, Na, BUN)  - CBC with platelets and differential    Depression, unspecified depression type  Continue Zoloft.  Mood stable.    30 minutes spent by me on the date of the encounter on chart review, review of test results, interpretation of tests, patient visit, and documentation       BMI  Estimated body mass index is 29.07 kg/m  as calculated from the following:    Height as of this encounter: 1.473 m (4' 10\").    Weight as of this encounter: 63.1 kg (139 lb 1.6 oz).   Weight management plan: Discussed healthy diet and exercise guidelines      Return in about 6 months (around 9/13/2024).    The likelihood of other entities in the differential is insufficient to justify any further testing for them at this time. This was explained to the patient. The patient was advised that persistent or worsening symptoms would require further evaluation. Patient advised to call the office and if unable to reach to go to the emergency room if they develop any new or worsening symptoms. Expressed understanding and agreement with above stated plan.     JIMMY Guadarrama [facility]   [facility] Bl[facility] is a 89 year old female presenting for the " "following health issues:  Patient presents with:  Medication Follow-up    Here today for routine follow-up.  Declines  today.  Daughter here today.    Overall doing very well.  Safe at home.  Ambulating without issue with cane.  Does use walker for longer distance.  No falls.  Appetite stable.  Requesting refill on compression socks which she uses daily.    Of note, has stated she has felt short of breath over the past 2 to 3 weeks.  Uses albuterol nebulizer over the past few days which has been helpful for the breathing.  Denies sick contacts.  Otherwise feels well.  No fever, chills, sweats, dyspnea on exertion, chest pain, new leg swelling, upper respiratory symptoms.    Review of Systems   Constitutional, HEENT, cardiovascular, pulmonary, GI, , musculoskeletal, neuro, skin, endocrine and psych systems are negative, except as otherwise noted.      Objective    /75   Pulse 91   Temp 99.1  F (37.3  C) (Temporal)   Resp 16   Ht 1.473 m (4' 10\")   Wt 63.1 kg (139 lb 1.6 oz)   SpO2 94%   BMI 29.07 kg/m    4' 10\"  139 lbs 1.6 oz    Physical Exam   GENERAL: healthy, alert and no distress  EYES: Eyes grossly normal to inspection, PERRL and conjunctivae and sclerae normal  HENT: ear canals and TM's normal, nose and mouth without ulcers or lesions  NECK: no adenopathy, no asymmetry, masses, or scars and thyroid normal to palpation  RESP: Wheezing in bilateral upper and middle airways otherwise lungs clear to auscultation.  No tachypnea or accessory muscle use.  CV: regular rate and rhythm, normal S1 S2, no S3 or S4, no murmur, click or rub, no peripheral edema and peripheral pulses strong  MS: no gross musculoskeletal defects noted, no edema  SKIN: no suspicious lesions or rashes  NEURO: Normal strength and tone, mentation intact and speech normal  PSYCH: mentation appears normal, affect normal/bright      "

## 2024-03-14 LAB
ANION GAP SERPL CALCULATED.3IONS-SCNC: 8 MMOL/L (ref 7–15)
BUN SERPL-MCNC: 15.2 MG/DL (ref 8–23)
CALCIUM SERPL-MCNC: 9.6 MG/DL (ref 8.2–9.6)
CHLORIDE SERPL-SCNC: 103 MMOL/L (ref 98–107)
CREAT SERPL-MCNC: 0.7 MG/DL (ref 0.51–0.95)
DEPRECATED HCO3 PLAS-SCNC: 30 MMOL/L (ref 22–29)
EGFRCR SERPLBLD CKD-EPI 2021: 76 ML/MIN/1.73M2
GLUCOSE SERPL-MCNC: 96 MG/DL (ref 70–99)
POTASSIUM SERPL-SCNC: 4.6 MMOL/L (ref 3.4–5.3)
SODIUM SERPL-SCNC: 141 MMOL/L (ref 135–145)

## 2024-03-15 ENCOUNTER — TELEPHONE (OUTPATIENT)
Dept: FAMILY MEDICINE | Facility: CLINIC | Age: 89
End: 2024-03-15
Payer: COMMERCIAL

## 2024-03-15 NOTE — TELEPHONE ENCOUNTER
Called patient using  services, [facility], #[phone]. Daughter answered. She is not on consent to communicate. She will call back when her mom is with her.      Patient Contact    Attempt # 1    Was call answered? No.    Left message for patient to call triage back.    Eula Gonsales RN

## 2024-03-15 NOTE — TELEPHONE ENCOUNTER
Ervin Ledesma PA-C  P Cs Triage Im  Can we call daughter/patient let her know that her blood counts, electrolytes, and kidney function are stable.  Chest x-ray is negative.  She should keep me posted on her wheezing/shortness of breath.  Reassured by chest x-ray.

## 2024-03-15 NOTE — RESULT ENCOUNTER NOTE
Can we call daughter/patient let her know that her blood counts, electrolytes, and kidney function are stable.  Chest x-ray is negative.  She should keep me posted on her wheezing/shortness of breath.  Reassured by chest x-ray.

## 2024-04-11 ENCOUNTER — PATIENT OUTREACH (OUTPATIENT)
Dept: GERIATRIC MEDICINE | Facility: CLINIC | Age: 89
End: 2024-04-11
Payer: COMMERCIAL

## 2024-04-11 NOTE — PROGRESS NOTES
[facility] to order a Willard watch through [employer] supplements for Aracelis. Placed request with CMS to process.    MARTA Lino  [facility]  [phone]  Fax: [phone]

## 2024-04-12 ENCOUNTER — PATIENT OUTREACH (OUTPATIENT)
Dept: GERIATRIC MEDICINE | Facility: CLINIC | Age: 89
End: 2024-04-12
Payer: COMMERCIAL

## 2024-04-12 NOTE — PROGRESS NOTES
[facility] Care Coordination Contact    Order placed with [employer] for Reemo Watch.  Order placed on 04/12/2024. Database updated.  As required, authorization submitted to health plan.    MARTA Lino  [facility]  [phone]  Fax: [phone]

## 2024-04-18 DIAGNOSIS — S32.040D CLOSED COMPRESSION FRACTURE OF L4 LUMBAR VERTEBRA WITH ROUTINE HEALING, SUBSEQUENT ENCOUNTER: ICD-10-CM

## 2024-04-18 NOTE — TELEPHONE ENCOUNTER
Prescription approved per [facility] Refill Protocol.  Pamela Uriarte RN  [facility]

## 2024-05-10 NOTE — PROGRESS NOTES
Received message from [employer] that they have been unable to reach Jamileh or family to confirm if telemonitoring is wanted. Spoke w/ dghtr Yoselin and she confirmed that they do not want telemonitoring. Responded to email from [employer] with this information.    Flora Luis [facility]  [phone]  Fax: [phone]

## 2024-05-17 DIAGNOSIS — S32.040D CLOSED COMPRESSION FRACTURE OF L4 LUMBAR VERTEBRA WITH ROUTINE HEALING, SUBSEQUENT ENCOUNTER: ICD-10-CM

## 2024-06-03 ENCOUNTER — TRANSFERRED RECORDS (OUTPATIENT)
Dept: HEALTH INFORMATION MANAGEMENT | Facility: CLINIC | Age: 89
End: 2024-06-03
Payer: COMMERCIAL

## 2024-06-05 ENCOUNTER — APPOINTMENT (OUTPATIENT)
Dept: CT IMAGING | Facility: CLINIC | Age: 89
End: 2024-06-05
Attending: EMERGENCY MEDICINE
Payer: COMMERCIAL

## 2024-06-05 ENCOUNTER — HOSPITAL ENCOUNTER (OUTPATIENT)
Facility: CLINIC | Age: 89
Setting detail: OBSERVATION
Discharge: NURSING, REHAB OR LONG-TERM CARE | End: 2024-06-08
Attending: EMERGENCY MEDICINE | Admitting: HOSPITALIST
Payer: COMMERCIAL

## 2024-06-05 DIAGNOSIS — J81.0 ACUTE PULMONARY EDEMA (H): ICD-10-CM

## 2024-06-05 DIAGNOSIS — K59.00 CONSTIPATION, UNSPECIFIED CONSTIPATION TYPE: ICD-10-CM

## 2024-06-05 DIAGNOSIS — F32.A DEPRESSION, UNSPECIFIED DEPRESSION TYPE: Primary | ICD-10-CM

## 2024-06-05 DIAGNOSIS — R06.02 SHORTNESS OF BREATH: ICD-10-CM

## 2024-06-05 DIAGNOSIS — N30.01 ACUTE CYSTITIS WITH HEMATURIA: ICD-10-CM

## 2024-06-05 DIAGNOSIS — I50.9 ACUTE ON CHRONIC CONGESTIVE HEART FAILURE, UNSPECIFIED HEART FAILURE TYPE (H): ICD-10-CM

## 2024-06-05 LAB
ALBUMIN SERPL BCG-MCNC: 3.8 G/DL (ref 3.5–5.2)
ALBUMIN UR-MCNC: 100 MG/DL
ALP SERPL-CCNC: 66 U/L (ref 40–150)
ALT SERPL W P-5'-P-CCNC: 14 U/L (ref 0–50)
ANION GAP SERPL CALCULATED.3IONS-SCNC: 12 MMOL/L (ref 7–15)
APPEARANCE UR: ABNORMAL
AST SERPL W P-5'-P-CCNC: 27 U/L (ref 0–45)
ATRIAL RATE - MUSE: 104 BPM
BACTERIA #/AREA URNS HPF: ABNORMAL /HPF
BASOPHILS # BLD AUTO: 0 10E3/UL (ref 0–0.2)
BASOPHILS NFR BLD AUTO: 0 %
BILIRUB DIRECT SERPL-MCNC: <0.2 MG/DL (ref 0–0.3)
BILIRUB SERPL-MCNC: 1.1 MG/DL
BILIRUB UR QL STRIP: NEGATIVE
BUN SERPL-MCNC: 11.3 MG/DL (ref 8–23)
CALCIUM SERPL-MCNC: 9.2 MG/DL (ref 8.2–9.6)
CHLORIDE SERPL-SCNC: 95 MMOL/L (ref 98–107)
COLOR UR AUTO: ABNORMAL
CREAT SERPL-MCNC: 0.48 MG/DL (ref 0.51–0.95)
D DIMER PPP FEU-MCNC: 2 UG/ML FEU (ref 0–0.5)
DEPRECATED HCO3 PLAS-SCNC: 26 MMOL/L (ref 22–29)
DIASTOLIC BLOOD PRESSURE - MUSE: NORMAL MMHG
EGFRCR SERPLBLD CKD-EPI 2021: 83 ML/MIN/1.73M2
EOSINOPHIL # BLD AUTO: 0 10E3/UL (ref 0–0.7)
EOSINOPHIL NFR BLD AUTO: 0 %
ERYTHROCYTE [DISTWIDTH] IN BLOOD BY AUTOMATED COUNT: 11.9 % (ref 10–15)
GLUCOSE SERPL-MCNC: 185 MG/DL (ref 70–99)
GLUCOSE UR STRIP-MCNC: NEGATIVE MG/DL
HCT VFR BLD AUTO: 39.2 % (ref 35–47)
HGB BLD-MCNC: 12.9 G/DL (ref 11.7–15.7)
HGB UR QL STRIP: ABNORMAL
HOLD SPECIMEN: NORMAL
IMM GRANULOCYTES # BLD: 0.1 10E3/UL
IMM GRANULOCYTES NFR BLD: 1 %
INTERPRETATION ECG - MUSE: NORMAL
KETONES UR STRIP-MCNC: NEGATIVE MG/DL
LEUKOCYTE ESTERASE UR QL STRIP: ABNORMAL
LIPASE SERPL-CCNC: 7 U/L (ref 13–60)
LYMPHOCYTES # BLD AUTO: 0.5 10E3/UL (ref 0.8–5.3)
LYMPHOCYTES NFR BLD AUTO: 5 %
MCH RBC QN AUTO: 28.1 PG (ref 26.5–33)
MCHC RBC AUTO-ENTMCNC: 32.9 G/DL (ref 31.5–36.5)
MCV RBC AUTO: 85 FL (ref 78–100)
MONOCYTES # BLD AUTO: 0.7 10E3/UL (ref 0–1.3)
MONOCYTES NFR BLD AUTO: 7 %
NEUTROPHILS # BLD AUTO: 9.5 10E3/UL (ref 1.6–8.3)
NEUTROPHILS NFR BLD AUTO: 87 %
NITRATE UR QL: NEGATIVE
NRBC # BLD AUTO: 0 10E3/UL
NRBC BLD AUTO-RTO: 0 /100
NT-PROBNP SERPL-MCNC: 1485 PG/ML (ref 0–1800)
P AXIS - MUSE: 72 DEGREES
PH UR STRIP: 7.5 [PH] (ref 5–7)
PLATELET # BLD AUTO: 162 10E3/UL (ref 150–450)
POTASSIUM SERPL-SCNC: 3.7 MMOL/L (ref 3.4–5.3)
PR INTERVAL - MUSE: 200 MS
PROT SERPL-MCNC: 6.8 G/DL (ref 6.4–8.3)
QRS DURATION - MUSE: 84 MS
QT - MUSE: 340 MS
QTC - MUSE: 447 MS
R AXIS - MUSE: -61 DEGREES
RBC # BLD AUTO: 4.59 10E6/UL (ref 3.8–5.2)
RBC URINE: 68 /HPF
SODIUM SERPL-SCNC: 133 MMOL/L (ref 135–145)
SP GR UR STRIP: 1.01 (ref 1–1.03)
SQUAMOUS EPITHELIAL: 3 /HPF
SYSTOLIC BLOOD PRESSURE - MUSE: NORMAL MMHG
T AXIS - MUSE: 64 DEGREES
TROPONIN T SERPL HS-MCNC: 15 NG/L
TROPONIN T SERPL HS-MCNC: 16 NG/L
UROBILINOGEN UR STRIP-MCNC: 2 MG/DL
VENTRICULAR RATE- MUSE: 104 BPM
WBC # BLD AUTO: 10.8 10E3/UL (ref 4–11)
WBC CLUMPS #/AREA URNS HPF: PRESENT /HPF
WBC URINE: 174 /HPF

## 2024-06-05 PROCEDURE — 36415 COLL VENOUS BLD VENIPUNCTURE: CPT | Performed by: HOSPITALIST

## 2024-06-05 PROCEDURE — 96365 THER/PROPH/DIAG IV INF INIT: CPT | Mod: 59

## 2024-06-05 PROCEDURE — 85379 FIBRIN DEGRADATION QUANT: CPT | Performed by: EMERGENCY MEDICINE

## 2024-06-05 PROCEDURE — 85025 COMPLETE CBC W/AUTO DIFF WBC: CPT | Performed by: EMERGENCY MEDICINE

## 2024-06-05 PROCEDURE — 84484 ASSAY OF TROPONIN QUANT: CPT | Performed by: EMERGENCY MEDICINE

## 2024-06-05 PROCEDURE — 74177 CT ABD & PELVIS W/CONTRAST: CPT

## 2024-06-05 PROCEDURE — 83880 ASSAY OF NATRIURETIC PEPTIDE: CPT | Performed by: EMERGENCY MEDICINE

## 2024-06-05 PROCEDURE — 84484 ASSAY OF TROPONIN QUANT: CPT | Performed by: HOSPITALIST

## 2024-06-05 PROCEDURE — 83690 ASSAY OF LIPASE: CPT | Performed by: EMERGENCY MEDICINE

## 2024-06-05 PROCEDURE — 82374 ASSAY BLOOD CARBON DIOXIDE: CPT | Performed by: EMERGENCY MEDICINE

## 2024-06-05 PROCEDURE — 99222 1ST HOSP IP/OBS MODERATE 55: CPT | Performed by: HOSPITALIST

## 2024-06-05 PROCEDURE — 87086 URINE CULTURE/COLONY COUNT: CPT | Performed by: EMERGENCY MEDICINE

## 2024-06-05 PROCEDURE — 250N000011 HC RX IP 250 OP 636: Performed by: EMERGENCY MEDICINE

## 2024-06-05 PROCEDURE — G0378 HOSPITAL OBSERVATION PER HR: HCPCS

## 2024-06-05 PROCEDURE — 250N000009 HC RX 250: Performed by: EMERGENCY MEDICINE

## 2024-06-05 PROCEDURE — 81001 URINALYSIS AUTO W/SCOPE: CPT | Performed by: EMERGENCY MEDICINE

## 2024-06-05 PROCEDURE — 250N000013 HC RX MED GY IP 250 OP 250 PS 637: Performed by: HOSPITALIST

## 2024-06-05 PROCEDURE — 93005 ELECTROCARDIOGRAM TRACING: CPT

## 2024-06-05 PROCEDURE — 96375 TX/PRO/DX INJ NEW DRUG ADDON: CPT | Mod: 59

## 2024-06-05 PROCEDURE — 99285 EMERGENCY DEPT VISIT HI MDM: CPT | Mod: 25

## 2024-06-05 PROCEDURE — 36415 COLL VENOUS BLD VENIPUNCTURE: CPT | Performed by: EMERGENCY MEDICINE

## 2024-06-05 PROCEDURE — 82248 BILIRUBIN DIRECT: CPT | Performed by: EMERGENCY MEDICINE

## 2024-06-05 RX ORDER — AMLODIPINE BESYLATE 5 MG/1
5 TABLET ORAL DAILY
Status: DISCONTINUED | OUTPATIENT
Start: 2024-06-05 | End: 2024-06-08 | Stop reason: HOSPADM

## 2024-06-05 RX ORDER — IOPAMIDOL 755 MG/ML
72 INJECTION, SOLUTION INTRAVASCULAR ONCE
Status: COMPLETED | OUTPATIENT
Start: 2024-06-05 | End: 2024-06-05

## 2024-06-05 RX ORDER — OLOPATADINE HYDROCHLORIDE 2 MG/ML
1 SOLUTION/ DROPS OPHTHALMIC PRN
COMMUNITY

## 2024-06-05 RX ORDER — AZELASTINE HYDROCHLORIDE 0.5 MG/ML
1 SOLUTION/ DROPS OPHTHALMIC 2 TIMES DAILY PRN
Status: DISCONTINUED | OUTPATIENT
Start: 2024-06-05 | End: 2024-06-08 | Stop reason: HOSPADM

## 2024-06-05 RX ORDER — CEFTRIAXONE 1 G/1
1 INJECTION, POWDER, FOR SOLUTION INTRAMUSCULAR; INTRAVENOUS EVERY 24 HOURS
Status: DISCONTINUED | OUTPATIENT
Start: 2024-06-06 | End: 2024-06-08

## 2024-06-05 RX ORDER — ONDANSETRON 2 MG/ML
4 INJECTION INTRAMUSCULAR; INTRAVENOUS EVERY 6 HOURS PRN
Status: DISCONTINUED | OUTPATIENT
Start: 2024-06-05 | End: 2024-06-08 | Stop reason: HOSPADM

## 2024-06-05 RX ORDER — SENNOSIDES 8.6 MG
8.6 TABLET ORAL DAILY PRN
Status: DISCONTINUED | OUTPATIENT
Start: 2024-06-05 | End: 2024-06-05

## 2024-06-05 RX ORDER — ASPIRIN 81 MG/1
81 TABLET ORAL DAILY
Status: DISCONTINUED | OUTPATIENT
Start: 2024-06-05 | End: 2024-06-08 | Stop reason: HOSPADM

## 2024-06-05 RX ORDER — PANTOPRAZOLE SODIUM 40 MG/1
40 TABLET, DELAYED RELEASE ORAL
Status: DISCONTINUED | OUTPATIENT
Start: 2024-06-06 | End: 2024-06-08 | Stop reason: HOSPADM

## 2024-06-05 RX ORDER — POLYETHYLENE GLYCOL 3350 17 G/17G
17 POWDER, FOR SOLUTION ORAL DAILY
Status: DISCONTINUED | OUTPATIENT
Start: 2024-06-05 | End: 2024-06-08 | Stop reason: HOSPADM

## 2024-06-05 RX ORDER — ONDANSETRON 4 MG/1
4 TABLET, ORALLY DISINTEGRATING ORAL EVERY 6 HOURS PRN
Status: DISCONTINUED | OUTPATIENT
Start: 2024-06-05 | End: 2024-06-08 | Stop reason: HOSPADM

## 2024-06-05 RX ORDER — SENNOSIDES 8.6 MG
8.6 TABLET ORAL 2 TIMES DAILY
Status: DISCONTINUED | OUTPATIENT
Start: 2024-06-05 | End: 2024-06-08 | Stop reason: HOSPADM

## 2024-06-05 RX ORDER — AMOXICILLIN 250 MG
1 CAPSULE ORAL 2 TIMES DAILY PRN
Status: DISCONTINUED | OUTPATIENT
Start: 2024-06-05 | End: 2024-06-08 | Stop reason: HOSPADM

## 2024-06-05 RX ORDER — GABAPENTIN 100 MG/1
100 CAPSULE ORAL
Status: DISCONTINUED | OUTPATIENT
Start: 2024-06-05 | End: 2024-06-08 | Stop reason: HOSPADM

## 2024-06-05 RX ORDER — FUROSEMIDE 10 MG/ML
20 INJECTION INTRAMUSCULAR; INTRAVENOUS ONCE
Status: COMPLETED | OUTPATIENT
Start: 2024-06-05 | End: 2024-06-05

## 2024-06-05 RX ORDER — ACETAMINOPHEN 325 MG/1
325-650 TABLET ORAL EVERY 4 HOURS PRN
Status: DISCONTINUED | OUTPATIENT
Start: 2024-06-05 | End: 2024-06-08 | Stop reason: HOSPADM

## 2024-06-05 RX ORDER — ALPRAZOLAM 0.25 MG
0.25 TABLET ORAL 3 TIMES DAILY PRN
Status: DISCONTINUED | OUTPATIENT
Start: 2024-06-05 | End: 2024-06-08 | Stop reason: HOSPADM

## 2024-06-05 RX ORDER — BISACODYL 10 MG
10 SUPPOSITORY, RECTAL RECTAL DAILY PRN
Status: DISCONTINUED | OUTPATIENT
Start: 2024-06-05 | End: 2024-06-08 | Stop reason: HOSPADM

## 2024-06-05 RX ORDER — AMLODIPINE BESYLATE 2.5 MG/1
2.5 TABLET ORAL DAILY
Status: DISCONTINUED | OUTPATIENT
Start: 2024-06-05 | End: 2024-06-05

## 2024-06-05 RX ORDER — AMOXICILLIN 250 MG
2 CAPSULE ORAL 2 TIMES DAILY PRN
Status: DISCONTINUED | OUTPATIENT
Start: 2024-06-05 | End: 2024-06-08 | Stop reason: HOSPADM

## 2024-06-05 RX ORDER — CEFTRIAXONE 1 G/1
1 INJECTION, POWDER, FOR SOLUTION INTRAMUSCULAR; INTRAVENOUS ONCE
Status: COMPLETED | OUTPATIENT
Start: 2024-06-05 | End: 2024-06-05

## 2024-06-05 RX ADMIN — SODIUM CHLORIDE 85 ML: 9 INJECTION, SOLUTION INTRAVENOUS at 12:32

## 2024-06-05 RX ADMIN — SENNOSIDES 8.6 MG: 8.6 TABLET, FILM COATED ORAL at 20:30

## 2024-06-05 RX ADMIN — FUROSEMIDE 20 MG: 10 INJECTION, SOLUTION INTRAMUSCULAR; INTRAVENOUS at 13:51

## 2024-06-05 RX ADMIN — POLYETHYLENE GLYCOL 3350 17 G: 17 POWDER, FOR SOLUTION ORAL at 16:51

## 2024-06-05 RX ADMIN — IOPAMIDOL 72 ML: 755 INJECTION, SOLUTION INTRAVENOUS at 12:24

## 2024-06-05 RX ADMIN — SERTRALINE HYDROCHLORIDE 50 MG: 50 TABLET ORAL at 16:48

## 2024-06-05 RX ADMIN — ACETAMINOPHEN 650 MG: 325 TABLET, FILM COATED ORAL at 20:30

## 2024-06-05 RX ADMIN — AMLODIPINE BESYLATE 5 MG: 5 TABLET ORAL at 16:50

## 2024-06-05 RX ADMIN — ASPIRIN 81 MG: 81 TABLET, COATED ORAL at 16:48

## 2024-06-05 RX ADMIN — CEFTRIAXONE SODIUM 1 G: 1 INJECTION, POWDER, FOR SOLUTION INTRAMUSCULAR; INTRAVENOUS at 13:51

## 2024-06-05 ASSESSMENT — ACTIVITIES OF DAILY LIVING (ADL)
ADLS_ACUITY_SCORE: 38
ADLS_ACUITY_SCORE: 42
ADLS_ACUITY_SCORE: 38

## 2024-06-05 ASSESSMENT — COLUMBIA-SUICIDE SEVERITY RATING SCALE - C-SSRS
1. IN THE PAST MONTH, HAVE YOU WISHED YOU WERE DEAD OR WISHED YOU COULD GO TO SLEEP AND NOT WAKE UP?: NO
6. HAVE YOU EVER DONE ANYTHING, STARTED TO DO ANYTHING, OR PREPARED TO DO ANYTHING TO END YOUR LIFE?: NO
2. HAVE YOU ACTUALLY HAD ANY THOUGHTS OF KILLING YOURSELF IN THE PAST MONTH?: NO

## 2024-06-05 NOTE — ED TRIAGE NOTES
BIBA from home (pt lives alone but daughter is her PCA 14 hours a day) with dizziness and hypertension along with constipation for 7 days. Daughter who is interpreting for the pt reports left hip pain since fall 2 days ago but pt is ambulating normally. She also reports 30 min episode of chest tightness just prior to arrival. Oxygen 90% on RA.     Triage Assessment (Adult)       Row Name 06/05/24 1021          Triage Assessment    Airway WDL WDL        Respiratory WDL    Respiratory WDL WDL        Skin Circulation/Temperature WDL    Skin Circulation/Temperature WDL WDL        Cardiac WDL    Cardiac WDL X  Pt had a 30 min episode of chest tightness prior to arrival, chest pain has resolved                     
Cont homemeds

## 2024-06-05 NOTE — PHARMACY-ADMISSION MEDICATION HISTORY
Pharmacy Intern Admission Medication History    Admission medication history is complete. The information provided in this note is only as accurate as the sources available at the time of the update.    Information Source(s): Patient, Family member, and CareEverywhere/SureScripts via in-person    Pertinent Information:   Patient's daughter said that she uses an inhaler that has a capsule but could not supply any more information. There are no records in surescripts to confirm.     Changes made to PTA medication list:  Added: Unknown to patient and pataday  Deleted: None  Changed: None    Allergies reviewed with patient and updates made in EHR: no    Medication History Completed By: Sariah Faye 6/5/2024 11:48 AM    PTA Med List   Medication Sig Last Dose    acetaminophen (TYLENOL) 325 MG tablet Take 325-650 mg by mouth every 4 hours as needed for mild pain Max acetaminophen dose: 4000 mg in 24 hrs Unknown at PRN    ALPRAZolam (XANAX) 0.25 MG tablet Take 0.25 mg by mouth 3 times daily as needed for anxiety 6/4/2024    amLODIPine (NORVASC) 2.5 MG tablet Take 1 tablet (2.5 mg) by mouth daily 6/5/2024    aspirin (ASPIRIN LOW DOSE) 81 MG EC tablet TAKE 1 TABLET BY MOUTH EVERY DAY WITH FOOD 6/4/2024    calcium carbonate 750 MG CHEW Take 750 mg by mouth as needed Unknown at PRN    calcium carbonate-vitamin D (OSCAL) 500-5 MG-MCG tablet Take 1 tablet by mouth 2 times daily (with meals) 6/4/2024    cholecalciferol (VITAMIN D3) 1000 UNIT tablet Take 1,000 Units by mouth daily  6/4/2024    cyanocobalamin (VITAMIN B-12) 1000 MCG tablet Take 1,000 mcg by mouth daily 6/4/2024    diclofenac (VOLTAREN) 1 % topical gel Apply 4 g topically 2 times daily 6/4/2024    Multiple Vitamins-Minerals (CENTRUM FRESH/FRUITY 50+ PO) Take 1 tablet by mouth daily 6/4/2024    olopatadine (PATADAY) 0.2 % ophthalmic solution Place 1 drop into both eyes as needed Unknown    Omega-3 Fatty Acids (OMEGA-3 FISH OIL) 1200 MG CAPS Take 1,200 mg by mouth  daily  6/4/2024    omeprazole (PRILOSEC) 20 MG DR capsule TAKE 1 CAPSULE BY MOUTH EVERY DAY BEFORE A MEAL 6/4/2024    sennosides (SENOKOT) 8.6 MG tablet TAKE 3 TABLETS BY MOUTH EVERY DAY 6/4/2024    sertraline (ZOLOFT) 50 MG tablet Take 1 tablet (50 mg) by mouth daily 6/4/2024    UNKNOWN TO PATIENT Patient's daughter says she uses an inhaler with a capsule, but could not supply any more information.

## 2024-06-05 NOTE — ED PROVIDER NOTES
Emergency Department Note      History of Present Illness     Chief Complaint  Dizziness and Hypertension    HPI  Aracelis Blakely is a 89 year old female with a history of hypertension who presents due to dizziness and hypertension. The patient's daughter reports experiencing dizziness that began today. Due to symptoms her daughter took the patient's blood pressure and noted it to be 220 systolic. Daughter also reports that the patient has been experiencing constipation for the past 7 days with lower abdominal pain. She notes that she has been unable to pass gas since onset of constipation. Daughter also reports that the patient fell two days ago. She noted left hip pain and was evaluated at [facility] and received an x-ray that was normal. The patient denies one-sided weakness, slurred speech, chest pain, shortness of breath, or vomiting. She also denies a history of blood clots.     Independent Historian  Daughter as detailed above.    Review of External Notes  I reviewed the patient's primary care note from 3/13/24.   Past Medical History   Medical History and Problem List  Hypertension   GERD   Osteoporosis   Depression   Insomnia   Compression fracture      Medications  Amlodipine   Xanax   Gabapentin   Zoloft   Aspirin   Prilosec     Surgical History   Cholecystectomy   Carpal tunnel release  Physical Exam   Patient Vitals for the past 24 hrs:   BP Temp Temp src Pulse Resp SpO2 Height Weight   06/05/24 1100 (!) 169/93 -- -- 104 20 96 % -- --   06/05/24 1020 (!) 152/97 -- -- 105 19 90 % -- --   06/05/24 1017 (!) 152/97 98.3  F (36.8  C) Oral 108 24 90 % 1.524 m (5') 65.3 kg (144 lb)     Physical Exam  General: Resting on the bed.  Head: No obvious trauma to head.  Ears, Nose, Throat:  External ears normal.  Nose normal.    Eyes:  Conjunctivae clear.  Pupils are equal, round, and reactive.   Neck: Normal range of motion.  Neck supple.   CV: Regular rate and rhythm.  No murmurs.      Respiratory: Effort normal  and breath sounds normal.  No wheezing or crackles.   Gastrointestinal: Soft.  No distension. There is mild diffuse tenderness.  There is no rigidity, no rebound and no guarding.   Musculoskeletal: Normal range of motion.  Non tender extremities to palpations.  Non tender c/t/l spine.  Mild pretibial edema   Neuro: Alert. Moving all extremities appropriately.  Normal speech.  CN II-XII grossly intact.  Gross muscle strength intact of the proximal and distal bilateral upper and lower extremities.  Sensation intact to light touch in all 4 extremities.    Skin: Skin is warm and dry.  No rash noted.     Diagnostics   Lab Results   Labs Ordered and Resulted from Time of ED Arrival to Time of ED Departure   BASIC METABOLIC PANEL - Abnormal       Result Value    Sodium 133 (*)     Potassium 3.7      Chloride 95 (*)     Carbon Dioxide (CO2) 26      Anion Gap 12      Urea Nitrogen 11.3      Creatinine 0.48 (*)     GFR Estimate 83      Calcium 9.2      Glucose 185 (*)    TROPONIN T, HIGH SENSITIVITY - Abnormal    Troponin T, High Sensitivity 15 (*)    CBC WITH PLATELETS AND DIFFERENTIAL - Abnormal    WBC Count 10.8      RBC Count 4.59      Hemoglobin 12.9      Hematocrit 39.2      MCV 85      MCH 28.1      MCHC 32.9      RDW 11.9      Platelet Count 162      % Neutrophils 87      % Lymphocytes 5      % Monocytes 7      % Eosinophils 0      % Basophils 0      % Immature Granulocytes 1      NRBCs per 100 WBC 0      Absolute Neutrophils 9.5 (*)     Absolute Lymphocytes 0.5 (*)     Absolute Monocytes 0.7      Absolute Eosinophils 0.0      Absolute Basophils 0.0      Absolute Immature Granulocytes 0.1      Absolute NRBCs 0.0     D DIMER QUANTITATIVE - Abnormal    D-Dimer Quantitative 2.00 (*)    LIPASE - Abnormal    Lipase 7 (*)    ROUTINE UA WITH MICROSCOPIC REFLEX TO CULTURE - Abnormal    Color Urine Orange (*)     Appearance Urine Cloudy (*)     Glucose Urine Negative      Bilirubin Urine Negative      Ketones Urine Negative       Specific Gravity Urine 1.011      Blood Urine Moderate (*)     pH Urine 7.5 (*)     Protein Albumin Urine 100 (*)     Urobilinogen Urine 2.0      Nitrite Urine Negative      Leukocyte Esterase Urine Moderate (*)     Bacteria Urine Many (*)     WBC Clumps Urine Present (*)     RBC Urine 68 (*)     WBC Urine 174 (*)     Squamous Epithelials Urine 3 (*)    NT PROBNP INPATIENT - Normal    N terminal Pro BNP Inpatient 1,485     HEPATIC FUNCTION PANEL - Normal    Protein Total 6.8      Albumin 3.8      Bilirubin Total 1.1      Alkaline Phosphatase 66      AST 27      ALT 14      Bilirubin Direct <0.20     URINE CULTURE       Imaging  CT Chest (PE) Abdomen Pelvis w Contrast   Preliminary Result   IMPRESSION: No evidence for pulmonary embolism.   1.  Irregular horizontal linear lucency in the T12 vertebral body is   consistent with an age-indeterminate fracture, possibly acute. This   fracture is new compared to 5/21/2021.   2.  Smooth interlobular septal thickening in both lungs is consistent   with pulmonary edema.   3.  Trace bilateral pleural effusions.   4.  Moderate amount of stool in the rectum.   5.  Ectasia of the ascending thoracic aorta measures 3.7 cm.          EKG   ECG taken at 1101, ECG read at 1114  Sinus tachycardia   Left axis deviation   Inferior infarct, age undetermined   Anterolateral infarct, age undetermined abnormal ECG    No significant change as compared to prior, dated 4/12/23.  Rate 104 bpm. TX interval 200 ms. QRS duration 84 ms. QT/QTc 340/447 ms. P-R-T axes 72 -61 64.    Independent Interpretation  None  ED Course    Medications Administered  Medications   furosemide (LASIX) injection 20 mg (has no administration in time range)   cefTRIAXone (ROCEPHIN) 1 g vial to attach to  mL bag for ADULTS or NS 50 mL bag for PEDS (has no administration in time range)   iopamidol (ISOVUE-370) solution 72 mL (72 mLs Intravenous $Given 6/5/24 1224)   Saline Flush (85 mLs Intravenous $Given 6/5/24  1232)       Discussion of Management  Admitting Hospitalist, Dr. Arroyo who accepted the patient for admission.     Social Determinants of Health adding to complexity of care  None    ED Course  ED Course as of 06/05/24 1351   Wed Jun 05, 2024   1034 I obtained history and examined the patient as noted above.    1338 I rechecked the patient and explained findings. We discussed plan for admission and patient is in agreement with plan.     1349 I spoke with Dr. Arroyo of the hospitalist team regarding the patient, who accepted the patient for admission.       Medical Decision Making / Diagnosis   CMS Diagnoses: None    MIPS  CT for PE was ordered because the patient had an abnormal d-dimer.    WOLFGANG Blakely is a 89 year old female who presents to the emergency department for shortness of breath, lightheadedness.  Broad differential was considered including not limited to CHF, ACS, arrhythmia, PE, pneumonia, pneumothorax, effusion, hypertensive urgency emergency, infection, obstruction, perforation, etc.  CBC shows no leukocytosis or anemia.  BMP looks reassuring, no acute electrolyte, metabolic or renal abnormality.  LFTs and lipase are reassuring as well, not concerning for obstructive biliary process, hepatitis, pancreatitis.  UA shows moderate leuk esterase quite a few white blood cells concerning for possible UTI given diffuse abdominal tenderness.  EKG showing sinus rhythm, no acute ischemic change.  Troponin minimally elevated at 15.  Will continue to trend.  Suspect this is demand in the setting of likely CHF.  Despite normal BNP, CT chest shows pulmonary edema and trace effusions.  This is consistent with likely CHF.  No PEs.  Also shows constipation.  Irregularity to the T12 vertebrae but patient has no tenderness over this area to indicate fracture.  Will admit the patient to the hospitalist for diuresis, UTI management.  Patient is agreeable.    Disposition  The patient was admitted to the  [facility].     ICD-10 Codes:    ICD-10-CM    1. Shortness of breath  R06.02       2. Acute pulmonary edema (H)  J81.0       3. Acute on chronic congestive heart failure, unspecified heart failure type (H)  I50.9       4. Acute cystitis with hematuria  N30.01       5. Constipation, unspecified constipation type  K59.00          Scribe Disclosure:  Ingrid CEDENO, am serving as a scribe at 10:25 AM on 6/5/2024 to document services personally performed by Pamela Gil MD based on my observations and the provider's statements to me.        Pamela Gil MD  06/05/24 1607

## 2024-06-05 NOTE — ED NOTES
[facility]  ED Nurse Handoff Report    ED Chief complaint: Dizziness and Hypertension      ED Diagnosis:   Final diagnoses:   None       Code Status:  Admitting MD to assess.      Allergies: No Known Allergies    Patient Story: BIBA from home (pt lives alone but daughter is her PCA 14 hours a day) with dizziness and hypertension along with constipation for 7 days. Daughter who is interpreting for the pt reports left hip pain since fall 2 days ago but pt is ambulating normally. She also reports 30 min episode of chest tightness just prior to arrival. Oxygen 90% on RA   Focused Assessment:  Patient is alert and oriented x 4, calm and cooperative. VS are stable, skin is warm and dry, respirations are even and non-labored on room air. Patient denied chest pain, shortness of breath, dizziness, and nausea.       Treatments and/or interventions provided:   Medications   iopamidol (ISOVUE-370) solution 72 mL (72 mLs Intravenous $Given 6/5/24 1224)   Saline Flush (85 mLs Intravenous $Given 6/5/24 1232)       Patient's response to treatments and/or interventions: Stable    To be done/followed up on inpatient unit:  [facility]    Does this patient have any cognitive concerns?: Forgetful    Activity level - Baseline/Home:  Unknown  Activity Level - Current:   Unknown    Patient's Preferred language: Farsi   Needed?: Yes    Isolation: None  Infection: Not Applicable  Patient tested for COVID 19 prior to admission: NO  Bariatric?: No    Vital Signs:   Vitals:    06/05/24 1017 06/05/24 1020 06/05/24 1100   BP: (!) 152/97 (!) 152/97 (!) 169/93   BP Location:   Right arm   Patient Position:   Supine   Pulse: 108 105 104   Resp: 24 19 20   Temp: 98.3  F (36.8  C)     TempSrc: Oral     SpO2: 90% 90% 96%   Weight: 65.3 kg (144 lb)     Height: 1.524 m (5')         Cardiac Rhythm:     Was the PSS-3 completed:   Yes  What interventions are required if any?               Family Comments: Daughter is at the bedside.    OBS brochure/video discussed/provided to patient/family: Yes              Name of person given brochure if not patient: NA              Relationship to patient: NA    For the majority of the shift this patient's behavior was Green.   Behavioral interventions performed were  information and reassurance provided.  .    ED NURSE PHONE NUMBER: [phone]

## 2024-06-05 NOTE — PROGRESS NOTES
Admission/Transfer from: ED   2 RN skin assessment completed. Yes, 2nd RN Hortensia Cronin RN   Significant findings include: abrasion/scab to upper back, scattered bruises    WOC Nurse Consult Ordered? No

## 2024-06-05 NOTE — H&P
[facility]    History and Physical  Hospitalist       Date of Admission:  6/5/2024    Assessment & Plan   Aracelis Blakely is a 89 year old Farsi speaking female who presents to the ER accompanied by her daughter with complaints of dizziness, hypertension and constipation for the past 1 week.  Daughter helps with interpretation.  Patient apparently had a fall 2 days ago and was complaining of left hip pain but was ambulating normally.  Did go to TCU and had x-rays that did not show any fracture.    Prior to arrival patient had a 30-minute episode of chest tightness and was found to have oxygen sats in the 90%'s on room air on arrival to the ER.  She was also endorsing some dizziness that started today.  At home daughter checked her blood pressure which was 220 systolic.  For the past 7 days she is also having severe constipation and has not even been passing gas.  Denies any fever, chills, nausea, vomiting or any history of blood clots.      #1 chest discomfort with shortness of breath  Acute on chronic congestive heart failure  Uncontrolled hypertension  -Patient had mildly elevated proBNP at 1485 but significantly elevated D-dimer at 2.  CT angio chest for PE was done which was negative for pulmonary embolism but did show septal thickening consistent with pulmonary edema with bilateral pleural effusions and moderate amount of stool in the rectum.  There was also concern for T12 vertebral fracture that was age-indeterminate.  -Was given one-time dose of IV Lasix 20 mg and is being admitted to the hospital under observation for further evaluation of underlying CHF.  -EKG shows sinus tachycardia with left axis deviation.  -Troponin at 15.  Repeat troponin pending.  -Stress test in 2016 showed normal myocardial perfusion with EF of 68%.  -Will hold off on further echo.  Discussed with the daughter.  They do not want any aggressive evaluations and would actually prefer discharge  possibly tomorrow if she remains stable.  -Monitor overnight when will optimize blood pressure medications  -On amlodipine 2.5 mg daily at home.  Continue the same and uptitrate as necessary.  -Will hold off on further Lasix for now since on my clinical exam she appears euvolemic.      #Depression-on Zoloft.    #Anxiety-on as needed alprazolam.    #UTI-urinalysis was positive for infection.  Urine culture done.  On 1 g IV ceftriaxone.    #Severe constipation-Per daughter patient has not had a bowel movement for 5 to 7 days and is also not passing a lot of gas.  On my exam patient has soft abdomen with good bowel sounds.  -ET abdomen did show moderate amount of stool.  -Will order oral MiraLAX and senna and Dulcolax suppository.    #Age-indeterminate T12 vertebral fracture-patient denies any acute pain at this time.  Continue to monitor.    Clinically Significant Risk Factors Present on Admission                # Drug Induced Platelet Defect: home medication list includes an antiplatelet medication   # Hypertension: Noted on problem list         # Overweight: Estimated body mass index is 28.12 kg/m  as calculated from the following:    Height as of this encounter: 1.524 m (5').    Weight as of this encounter: 65.3 kg (144 lb).               DVT Prophylaxis: Pneumatic Compression Devices  Code Status: Full Code  Medically Ready for Discharge: Anticipated Tomorrow    Greater than 60 minutes spent on documentation review, imaging review, examining the patient and discussing care with the patient and daughter at bedside    Marium Arroyo MD, MD  [phone] (p)  [phone] (c)    Primary Care Physician   Ervin Ledesma    Chief Complaint   Mild shortness of breath with chest discomfort and severe constipation    History is obtained from the patient and review of medical records.    History of Present Illness   Aracelis Blakely is a 89 year old Farsi speaking female who presents to the ER accompanied by her  daughter with complaints of dizziness, hypertension and constipation for the past 1 week.  Daughter helps with interpretation.  Patient apparently had a fall 2 days ago and was complaining of left hip pain but was ambulating normally.  Did go to TCU and had x-rays that did not show any fracture.    Prior to arrival patient had a 30-minute episode of chest tightness and was found to have oxygen sats in the 90%'s on room air on arrival to the ER.  She was also endorsing some dizziness that started today.  At home daughter checked her blood pressure which was 220 systolic.  For the past 7 days she is also having severe constipation and has not even been passing gas.  Denies any fever, chills, nausea, vomiting or any history of blood clots.      Patient had mildly elevated proBNP at 1485 but significantly elevated D-dimer at 2.  CT angio chest for PE was done which was negative for pulmonary embolism but did show septal thickening consistent with pulmonary edema with bilateral pleural effusions and moderate amount of stool in the rectum.  There was also concern for T12 vertebral fracture that was age-indeterminate.  -Was given one-time dose of IV Lasix 20 mg and is being admitted to the hospital under observation for further evaluation of underlying CHF    Other past medical history significant for chronic constipation, GERD, abdominal discomfort, gastritis, varicose veins, depression and hypertension    Past Medical History    I have reviewed this patient's medical history and updated it with pertinent information if needed.   Past Medical History:   Diagnosis Date    Hypertension      Past Surgical History   I have reviewed this patient's surgical history and updated it with pertinent information if needed.  Past Surgical History:   Procedure Laterality Date    CHOLECYSTECTOMY      RELEASE CARPAL TUNNEL Right 6/13/2016     Prior to Admission Medications   Prior to Admission Medications   Prescriptions Last Dose  Informant Patient Reported? Taking?   ALPRAZolam (XANAX) 0.25 MG tablet 2024 Daughter Yes Yes   Sig: Take 0.25 mg by mouth 3 times daily as needed for anxiety   Ascorbic Acid (VITAMIN C/BIOFLAVONOIDS/ROSEHP PO)  Daughter Yes No   Sig: Take 1 tablet by mouth daily    Efinaconazole 10 % SOLN Not Taking  No No   Sig: Externally apply topically daily   Patient not taking: Reported on 2024   Multiple Vitamin (DAILY-NETTA MULTIVITAMIN) TABS   No No   Sig: Take 1 tablet by mouth daily   Multiple Vitamins-Minerals (CENTRUM FRESH/FRUITY 50+ PO) 2024  Yes Yes   Sig: Take 1 tablet by mouth daily   OVER-THE-COUNTER  Daughter Yes No   Si mg 3 times daily as needed (Takes 3x daily when senokot isn't available or if senokot doesn't work for her.)   Omega-3 Fatty Acids (OMEGA-3 FISH OIL) 1200 MG CAPS 2024 Daughter Yes Yes   Sig: Take 1,200 mg by mouth daily    UNKNOWN TO PATIENT   Yes Yes   Sig: Patient's daughter says she uses an inhaler with a capsule, but could not supply any more information.   acetaminophen (TYLENOL) 325 MG tablet Unknown at PRN Daughter Yes Yes   Sig: Take 325-650 mg by mouth every 4 hours as needed for mild pain Max acetaminophen dose: 4000 mg in 24 hrs   albuterol (2.5 MG/3ML) 0.083% neb solution Not Taking Daughter Yes No   Sig: Inhale 2.5 mg into the lungs every 4 hours as needed    Patient not taking: Reported on 2024   amLODIPine (NORVASC) 2.5 MG tablet 2024  No Yes   Sig: Take 1 tablet (2.5 mg) by mouth daily   aspirin (ASPIRIN LOW DOSE) 81 MG EC tablet 2024  No Yes   Sig: TAKE 1 TABLET BY MOUTH EVERY DAY WITH FOOD   azelastine (OPTIVAR) 0.05 % SOLN ophthalmic solution  Daughter Yes No   Sig: Place 1 drop into both eyes 2 times daily   calcium carbonate 750 MG CHEW Unknown at PRN  Yes Yes   Sig: Take 750 mg by mouth as needed   calcium carbonate-vitamin D (OSCAL) 500-5 MG-MCG tablet 2024  No Yes   Sig: Take 1 tablet by mouth 2 times daily (with meals)    cholecalciferol (VITAMIN D3) 1000 UNIT tablet 6/4/2024 Daughter Yes Yes   Sig: Take 1,000 Units by mouth daily    ciclopirox (PENLAC) 8 % external solution Not Taking Daughter No No   Sig: Apply to adjacent skin and affected nails daily.  Remove with alcohol every 7 days, then repeat.   Patient not taking: Reported on 6/5/2024   cyanocobalamin (VITAMIN B-12) 1000 MCG tablet 6/4/2024  Yes Yes   Sig: Take 1,000 mcg by mouth daily   diclofenac (VOLTAREN) 1 % topical gel 6/4/2024  No Yes   Sig: Apply 4 g topically 2 times daily   gabapentin (NEURONTIN) 100 MG capsule Not Taking  No No   Sig: Take 1 capsule (100 mg) by mouth nightly as needed for neuropathic pain   Patient not taking: Reported on 6/5/2024   guaiFENesin (MUCINEX) 600 MG 12 hr tablet Not Taking Daughter Yes No   Sig: Take 1,200 mg by mouth 2 times daily as needed for congestion   Patient not taking: Reported on 6/5/2024   lidocaine (LIDODERM) 5 % patch Not Taking  No No   Sig: PLACE 1 PATCH ONTO THE SKIN EVERY 24 HOURS TO PREVENT LIDOCAINE TOXICITY, PATIENT SHOULD BE PATCH FREE FOR 12 HRS DAILY.   Patient not taking: Reported on 6/5/2024   olopatadine (PATADAY) 0.2 % ophthalmic solution Unknown  Yes Yes   Sig: Place 1 drop into both eyes as needed   omeprazole (PRILOSEC) 20 MG DR capsule 6/4/2024  No Yes   Sig: TAKE 1 CAPSULE BY MOUTH EVERY DAY BEFORE A MEAL   order for DME  Daughter Yes No   Sig: Knee high compression stockings.  Light compression.  16-20mm Hg.  Ok 3 pairs.   order for DME  Daughter No No   Sig: WALKER; with wheels, and brakes, and a seat.   order for DME  Daughter No No   Sig: Equipment being ordered:  Compression stockings,knee high,mild compression   polyethylene glycol (MIRALAX) 17 GM/Dose powder Not Taking  No No   Sig: Take 17 g by mouth daily as needed for constipation   Patient not taking: Reported on 6/5/2024   sennosides (SENOKOT) 8.6 MG tablet 6/4/2024  No Yes   Sig: TAKE 3 TABLETS BY MOUTH EVERY DAY   sertraline (ZOLOFT) 50  MG tablet 6/4/2024  No Yes   Sig: Take 1 tablet (50 mg) by mouth daily      Facility-Administered Medications: None     Allergies   No Known Allergies  Social History   I have reviewed this patient's social history and updated it with pertinent information if needed.   Aracelis  reports that she has never smoked. She has never used smokeless tobacco. She reports that she does not drink alcohol and does not use drugs. She     Family History   I have reviewed this patient's family history and updated it with pertinent information if needed.    Problem (# of Occurrences) Relation (Name,Age of Onset)    Unknown/Adopted (2) Mother, Father          Review of Systems   The 10 point Review of Systems is negative other than noted in the HPI or here.     Physical Exam   Temp: 98.3  F (36.8  C) Temp src: Oral BP: (!) 169/93 Pulse: 104   Resp: 20 SpO2: 96 % O2 Device: None (Room air)    Vital Signs with Ranges  Temp:  [98.3  F (36.8  C)] 98.3  F (36.8  C)  Pulse:  [104-108] 104  Resp:  [19-24] 20  BP: (152-169)/(93-97) 169/93  SpO2:  [90 %-96 %] 96 %  144 lbs 0 oz    Physical Exam  Constitutional:       Comments: Old and frail    Cardiovascular:      Rate and Rhythm: Regular rhythm. Tachycardia present.      Pulses: Normal pulses.      Heart sounds: Normal heart sounds.   Pulmonary:      Effort: Pulmonary effort is normal. No respiratory distress.      Breath sounds: Normal breath sounds.   Abdominal:      General: Abdomen is flat. Bowel sounds are normal. There is no distension.      Tenderness: There is no abdominal tenderness. There is no guarding.   Skin:     General: Skin is warm and dry.   Neurological:      General: No focal deficit present.         Data   Data reviewed today:  I personally reviewed the chest CT image(s) showing irregular horizontal lucency in the T12 vertebral body, smooth interlobular septal thickening in both lungs consistent with pulmonary edema, trace bilateral pleural effusions ectasia of ascending  aorta measures 3.7 cm .  Recent Labs   Lab 06/05/24  1039   WBC 10.8   HGB 12.9   MCV 85      *   POTASSIUM 3.7   CHLORIDE 95*   CO2 26   BUN 11.3   CR 0.48*   ANIONGAP 12   JINA 9.2   *   ALBUMIN 3.8   PROTTOTAL 6.8   BILITOTAL 1.1   ALKPHOS 66   ALT 14   AST 27   LIPASE 7*       Recent Results (from the past 24 hour(s))   CT Chest (PE) Abdomen Pelvis w Contrast    Narrative    CT CHEST PE, ABDOMEN & PELVIS WITH CONTRAST June 5, 2024 12:52 PM    CLINICAL HISTORY: Chest and abdominal pain. Constipation.    TECHNIQUE: CT angiogram chest and routine CT abdomen pelvis with IV  contrast. Arterial phase through the chest and venous phase through  the abdomen and pelvis. 2D and 3D MIP reconstructions were preformed  by the CT technologist. Dose reduction techniques were used.   CONTRAST: 72 mL Isovue-[id number].    COMPARISON: CT of the abdomen and pelvis 5/21/2021.    FINDINGS:  ANGIOGRAM CHEST: Pulmonary arteries are normal caliber and negative  for pulmonary emboli. Moderate atherosclerotic calcification of the  thoracic aorta. Ectasia of the ascending thoracic aorta measures 3.7  cm.    LUNGS AND PLEURA: Trace bilateral pleural effusions. Mild interlobular  septal thickening bilaterally consistent with pulmonary edema.    MEDIASTINUM/AXILLAE: No enlarged lymph nodes in the chest. No  pericardial effusion.    CORONARY ARTERY CALCIFICATION: Mild.    HEPATOBILIARY: Cholecystectomy. Mild intra and extrahepatic biliary  prominence is unchanged, and may be related to the patient's age and  postcholecystectomy state. No hepatic masses.    PANCREAS: Normal.    SPLEEN: Curvilinear calcification about the superior anterior aspect  of the spleen is unchanged, likely related to old subcapsular  collection.    ADRENAL GLANDS: Normal.    KIDNEYS/BLADDER: Small bilateral renal cysts would require no specific  follow-up. No hydronephrosis.    BOWEL: Moderate amount of stool in the rectum. No bowel obstruction.  No  evidence for colitis or diverticulitis. Unremarkable appendix.    PELVIC ORGANS: Hysterectomy. No free fluid in the pelvis.    LYMPH NODES: No lymphadenopathy.    VASCULATURE: Moderate atherosclerotic aortoiliac calcification.    ADDITIONAL FINDINGS: None.    MUSCULOSKELETAL: Degenerative changes in the thoracolumbar spine.  Irregular horizontal linear lucency in the T12 vertebral body is new  since the previous exam, and is consistent with an age indeterminate  fracture, possibly acute. Moderate compression of the L4 vertebral  body is unchanged.      Impression    IMPRESSION: No evidence for pulmonary embolism.  1.  Irregular horizontal linear lucency in the T12 vertebral body is  consistent with an age-indeterminate fracture, possibly acute. This  fracture is new compared to 5/21/2021.  2.  Smooth interlobular septal thickening in both lungs is consistent  with pulmonary edema.  3.  Trace bilateral pleural effusions.  4.  Moderate amount of stool in the rectum.  5.  Ectasia of the ascending thoracic aorta measures 3.7 cm.

## 2024-06-05 NOTE — ED NOTES
Bed: ED07  Expected date:   Expected time:   Means of arrival:   Comments:  E2  89 F dizzy/htn  1008

## 2024-06-06 ENCOUNTER — APPOINTMENT (OUTPATIENT)
Dept: PHYSICAL THERAPY | Facility: CLINIC | Age: 89
End: 2024-06-06
Attending: HOSPITALIST
Payer: COMMERCIAL

## 2024-06-06 LAB
ANION GAP SERPL CALCULATED.3IONS-SCNC: 11 MMOL/L (ref 7–15)
BACTERIA UR CULT: NORMAL
BUN SERPL-MCNC: 14.3 MG/DL (ref 8–23)
CALCIUM SERPL-MCNC: 8.7 MG/DL (ref 8.2–9.6)
CHLORIDE SERPL-SCNC: 94 MMOL/L (ref 98–107)
CREAT SERPL-MCNC: 0.47 MG/DL (ref 0.51–0.95)
DEPRECATED HCO3 PLAS-SCNC: 27 MMOL/L (ref 22–29)
EGFRCR SERPLBLD CKD-EPI 2021: 83 ML/MIN/1.73M2
ERYTHROCYTE [DISTWIDTH] IN BLOOD BY AUTOMATED COUNT: 11.7 % (ref 10–15)
GLUCOSE SERPL-MCNC: 145 MG/DL (ref 70–99)
HCT VFR BLD AUTO: 38.9 % (ref 35–47)
HGB BLD-MCNC: 13 G/DL (ref 11.7–15.7)
MCH RBC QN AUTO: 28.4 PG (ref 26.5–33)
MCHC RBC AUTO-ENTMCNC: 33.4 G/DL (ref 31.5–36.5)
MCV RBC AUTO: 85 FL (ref 78–100)
PLATELET # BLD AUTO: 185 10E3/UL (ref 150–450)
POTASSIUM SERPL-SCNC: 3.3 MMOL/L (ref 3.4–5.3)
POTASSIUM SERPL-SCNC: 4 MMOL/L (ref 3.4–5.3)
RBC # BLD AUTO: 4.58 10E6/UL (ref 3.8–5.2)
SODIUM SERPL-SCNC: 132 MMOL/L (ref 135–145)
WBC # BLD AUTO: 10.9 10E3/UL (ref 4–11)

## 2024-06-06 PROCEDURE — 999N000128 HC STATISTIC PERIPHERAL IV START W/O US GUIDANCE

## 2024-06-06 PROCEDURE — 250N000011 HC RX IP 250 OP 636: Mod: JZ | Performed by: HOSPITALIST

## 2024-06-06 PROCEDURE — 99232 SBSQ HOSP IP/OBS MODERATE 35: CPT | Performed by: HOSPITALIST

## 2024-06-06 PROCEDURE — 85027 COMPLETE CBC AUTOMATED: CPT | Performed by: HOSPITALIST

## 2024-06-06 PROCEDURE — G0378 HOSPITAL OBSERVATION PER HR: HCPCS

## 2024-06-06 PROCEDURE — 250N000013 HC RX MED GY IP 250 OP 250 PS 637: Performed by: HOSPITALIST

## 2024-06-06 PROCEDURE — 97161 PT EVAL LOW COMPLEX 20 MIN: CPT | Mod: GP

## 2024-06-06 PROCEDURE — 84132 ASSAY OF SERUM POTASSIUM: CPT | Performed by: HOSPITALIST

## 2024-06-06 PROCEDURE — 97530 THERAPEUTIC ACTIVITIES: CPT | Mod: GP

## 2024-06-06 PROCEDURE — 36415 COLL VENOUS BLD VENIPUNCTURE: CPT | Performed by: HOSPITALIST

## 2024-06-06 PROCEDURE — 96376 TX/PRO/DX INJ SAME DRUG ADON: CPT

## 2024-06-06 PROCEDURE — 80048 BASIC METABOLIC PNL TOTAL CA: CPT | Performed by: HOSPITALIST

## 2024-06-06 RX ORDER — LIDOCAINE 4 G/G
1 PATCH TOPICAL
Status: DISCONTINUED | OUTPATIENT
Start: 2024-06-06 | End: 2024-06-08 | Stop reason: HOSPADM

## 2024-06-06 RX ORDER — CYCLOBENZAPRINE HCL 10 MG
10 TABLET ORAL 3 TIMES DAILY
Status: DISCONTINUED | OUTPATIENT
Start: 2024-06-06 | End: 2024-06-08 | Stop reason: HOSPADM

## 2024-06-06 RX ORDER — POTASSIUM CHLORIDE 1.5 G/1.58G
40 POWDER, FOR SOLUTION ORAL ONCE
Status: COMPLETED | OUTPATIENT
Start: 2024-06-06 | End: 2024-06-06

## 2024-06-06 RX ADMIN — POTASSIUM CHLORIDE 40 MEQ: 1.5 POWDER, FOR SOLUTION ORAL at 12:28

## 2024-06-06 RX ADMIN — SENNOSIDES 8.6 MG: 8.6 TABLET, FILM COATED ORAL at 19:43

## 2024-06-06 RX ADMIN — CYCLOBENZAPRINE 10 MG: 10 TABLET, FILM COATED ORAL at 12:28

## 2024-06-06 RX ADMIN — LIDOCAINE 1 PATCH: 560 PATCH PERCUTANEOUS; TOPICAL; TRANSDERMAL at 12:29

## 2024-06-06 RX ADMIN — ASPIRIN 81 MG: 81 TABLET, COATED ORAL at 10:11

## 2024-06-06 RX ADMIN — SENNOSIDES 8.6 MG: 8.6 TABLET, FILM COATED ORAL at 10:13

## 2024-06-06 RX ADMIN — SERTRALINE HYDROCHLORIDE 50 MG: 50 TABLET ORAL at 10:12

## 2024-06-06 RX ADMIN — AMLODIPINE BESYLATE 5 MG: 5 TABLET ORAL at 10:12

## 2024-06-06 RX ADMIN — CYCLOBENZAPRINE 10 MG: 10 TABLET, FILM COATED ORAL at 18:07

## 2024-06-06 RX ADMIN — CEFTRIAXONE SODIUM 1 G: 1 INJECTION, POWDER, FOR SOLUTION INTRAMUSCULAR; INTRAVENOUS at 17:17

## 2024-06-06 RX ADMIN — POLYETHYLENE GLYCOL 3350 17 G: 17 POWDER, FOR SOLUTION ORAL at 10:15

## 2024-06-06 RX ADMIN — PANTOPRAZOLE SODIUM 40 MG: 40 TABLET, DELAYED RELEASE ORAL at 06:28

## 2024-06-06 ASSESSMENT — ACTIVITIES OF DAILY LIVING (ADL)
ADLS_ACUITY_SCORE: 44
ADLS_ACUITY_SCORE: 46
ADLS_ACUITY_SCORE: 44
ADLS_ACUITY_SCORE: 44
ADLS_ACUITY_SCORE: 46
ADLS_ACUITY_SCORE: 42
ADLS_ACUITY_SCORE: 44
ADLS_ACUITY_SCORE: 44
ADLS_ACUITY_SCORE: 46
ADLS_ACUITY_SCORE: 46
ADLS_ACUITY_SCORE: 44
ADLS_ACUITY_SCORE: 42
ADLS_ACUITY_SCORE: 46
ADLS_ACUITY_SCORE: 44
ADLS_ACUITY_SCORE: 46

## 2024-06-06 NOTE — PLAN OF CARE
Goal Outcome Evaluation:      Plan of Care Reviewed With: patient, caregiver      PRIMARY Concern: dizziness, hypertension and constipation for the past 1 week   Tests/Procedures for NEXT shift: none   Consults? (Pending/following, signed-off?): none   Safety Risk CONCERNS (fall risk, behaviors, etc.): fall risk      Where is patient from? (Home, TCU, etc.): home   Isolation/Type: none   Other Important info for next shift: daughter is PCA, speaks Farsi, needs interpretor   Anticipated DC date & active delays: TBD   SUMMARY NOTE:  Orientation/Cognitive: A/Ox4, forgetful, lethargic   Observation Goals (Met/ Not Met): not met   Mobility Level/Assist Equipment: A1 Gb/walker   Antibiotics & Plan (IV/po, length of tx left): cefTRIAXone (ROCEPHIN)   Pain Management: denies pain   Tele/VS/O2: VSS on RA  ABNL Lab/BG: troponin 16. K+ 3.3, recheck pending   Diet: regular   Bowel/Bladder: purewick, incont of B/B, Bladder scanned for 267ml  Skin Concerns: scattered abrasions   Drains/Devices: IV SL  Patient Stated Goal for Today: to feel better

## 2024-06-06 NOTE — PROGRESS NOTES
PRIMARY Concern: dizziness, HTN, constipation x1 week   SAFETY RISK Concerns (fall risk, behaviors, etc.): fall       Isolation/Type: n/a  Tests/Procedures for NEXT shift: AM labs   Consults? (Pending/following, signed-off?) none   Where is patient from? (Home, TCU, etc.): home-daughter is her PCA  Other Important info for NEXT shift: Farsi speaking, needs interpretor   Anticipated DC date & active delays: TBD pending clinical stability   _____________________________________________________________________________  SUMMARY NOTE:  Orientation/Cognitive: A&O x4  Observation Goals (Met/ Not Met): not met   Mobility Level/Assist Equipment: Ax1, GBW  Antibiotics & Plan (IV/po, length of tx left): Rocephin q24 hr   Pain Management: L lower back relieved by PRN Tylenol  Tele/VS/O2: VSS on 1L NC   ABNL Lab/BG: See Epic   Diet: regular   Bowel/Bladder: incontinent bowel and bladder   Skin Concerns:   Drains/Devices: PIV SL   Patient Stated Goal for Today: rest       Observation goals:     -diagnostic tests and consults completed and resulted- not met    -vital signs normal or at patient baseline- not met     Nurse to notify provider when observation goals have been met and patient is ready for discharge.

## 2024-06-06 NOTE — PROGRESS NOTES
Observation goals  PRIOR TO DISCHARGE        Comments: -diagnostic tests and consults completed and resulted: not met   -vital signs normal or at patient baseline: not met   Nurse to notify provider when observation goals have been met and patient is ready for discharge.

## 2024-06-06 NOTE — PROGRESS NOTES
[facility]    Hospitalist Progress Note    Interval History   Patient is awake and alert this morning.  On room air.  Daughter at bedside to help translate.  Patient is endorsing some mid back pain as well.  Unable to get up and move very well.    -Data reviewed today: I reviewed all new labs and imaging results over the last 24 hours. I personally reviewed the CT chest PE abdomen pelvis image(s) showing no PE.  A T12 vertebral fracture possibly acute, age-indeterminate.  New compared to 2021.  Pulmonary edema seen. .    Physical Exam   Temp: 98.2  F (36.8  C) Temp src: Oral BP: (!) 148/90 Pulse: 94   Resp: 18 SpO2: 92 % O2 Device: None (Room air) Oxygen Delivery: 1 LPM  Vitals:    06/05/24 1017 06/05/24 1511   Weight: 65.3 kg (144 lb) 67.1 kg (147 lb 14.9 oz)     Vital Signs with Ranges  Temp:  [97.4  F (36.3  C)-98.6  F (37  C)] 98.2  F (36.8  C)  Pulse:  [] 94  Resp:  [16-18] 18  BP: (132-168)/(76-92) 148/90  SpO2:  [92 %-98 %] 92 %  No intake/output data recorded.    Physical Exam  Constitutional:       Comments: Old and frail   Cardiovascular:      Rate and Rhythm: Regular rhythm. Tachycardia present.      Pulses: Normal pulses.      Heart sounds: Murmur heard.   Pulmonary:      Effort: Pulmonary effort is normal. No respiratory distress.      Breath sounds: Normal breath sounds.   Abdominal:      General: Abdomen is flat. Bowel sounds are normal. There is no distension.      Tenderness: There is no abdominal tenderness. There is no guarding.   Skin:     General: Skin is warm and dry.   Neurological:      General: No focal deficit present.           Medications   Current Facility-Administered Medications   Medication Dose Route Frequency Provider Last Rate Last Admin     Current Facility-Administered Medications   Medication Dose Route Frequency Provider Last Rate Last Admin    amLODIPine (NORVASC) tablet 5 mg  5 mg Oral Daily Marium Arroyo MD   5 mg at 06/06/24 1012     aspirin EC tablet 81 mg  81 mg Oral Daily Marium Arroyo MD   81 mg at 06/06/24 1011    cefTRIAXone (ROCEPHIN) 1 g vial to attach to  mL bag for ADULTS or NS 50 mL bag for PEDS  1 g Intravenous Q24H Marium Arroyo MD        cyclobenzaprine (FLEXERIL) tablet 10 mg  10 mg Oral TID Marium Arroyo MD   10 mg at 06/06/24 1228    Lidocaine (LIDOCARE) 4 % Patch 1 patch  1 patch Transdermal Q24H Marium Arroyo MD   1 patch at 06/06/24 1229    pantoprazole (PROTONIX) EC tablet 40 mg  40 mg Oral QAM AC Marium Arroyo MD   40 mg at 06/06/24 0628    polyethylene glycol (MIRALAX) Packet 17 g  17 g Oral Daily Marium Arroyo MD   17 g at 06/06/24 1015    sennosides (SENOKOT) tablet 8.6 mg  8.6 mg Oral BID Marium Arroyo MD   8.6 mg at 06/06/24 1013    sertraline (ZOLOFT) tablet 50 mg  50 mg Oral Daily Marium Arroyo MD   50 mg at 06/06/24 1012       Data   Recent Labs   Lab 06/06/24  0718 06/05/24  1039   WBC 10.9 10.8   HGB 13.0 12.9   MCV 85 85    162   * 133*   POTASSIUM 3.3* 3.7   CHLORIDE 94* 95*   CO2 27 26   BUN 14.3 11.3   CR 0.47* 0.48*   ANIONGAP 11 12   JINA 8.7 9.2   * 185*   ALBUMIN  --  3.8   PROTTOTAL  --  6.8   BILITOTAL  --  1.1   ALKPHOS  --  66   ALT  --  14   AST  --  27   LIPASE  --  7*       No results found for this or any previous visit (from the past 24 hour(s)).      Assessment & Plan      Aracelis Blakely is a 89 year old Farsi speaking female who presents to the ER accompanied by her daughter with complaints of dizziness, hypertension and constipation for the past 1 week.  Daughter helps with interpretation.  Patient apparently had a fall 2 days ago and was complaining of left hip pain but was ambulating normally.  Did go to TCU and had x-rays that did not show any fracture.     Prior to arrival patient had a 30-minute episode of chest tightness and was found to have oxygen sats in the 90%'s on room air on arrival to the ER.  She was also  endorsing some dizziness that started today.  At home daughter checked her blood pressure which was 220 systolic.  For the past 7 days she is also having severe constipation and has not even been passing gas.  Denies any fever, chills, nausea, vomiting or any history of blood clots.       #1 chest discomfort with shortness of breath  Acute on chronic congestive heart failure  Uncontrolled hypertension  -Patient had mildly elevated proBNP at 1485 but significantly elevated D-dimer at 2.  CT angio chest for PE was done which was negative for pulmonary embolism but did show septal thickening consistent with pulmonary edema with bilateral pleural effusions and moderate amount of stool in the rectum.  There was also concern for T12 vertebral fracture that was age-indeterminate.  -Was given one-time dose of IV Lasix 20 mg and is being admitted to the hospital under observation for further evaluation of underlying CHF.  -EKG shows sinus tachycardia with left axis deviation.  -Troponin at 15--16  -Stress test in 2016 showed normal myocardial perfusion with EF of 68%.  -Will hold off on further echo.  Discussed with the daughter.  They do not want any aggressive evaluations and would actually prefer discharge possibly tomorrow if she remains stable.  -Monitor overnight when will optimize blood pressure medications  -Increased amlodipine to 5 mg daily for uncontrolled hypertension.  -Will hold off on further Lasix for now since on my clinical exam she appears euvolemic.     #Depression-on Zoloft.     #Anxiety-on as needed alprazolam.     #UTI-urinalysis was positive for infection.  Urine culture done.  On 1 g IV ceftriaxone.     #Severe constipation-Per daughter patient has not had a bowel movement for 5 to 7 days and is also not passing a lot of gas.  On my exam patient has soft abdomen with good bowel sounds.  -ET abdomen did show moderate amount of stool.  -Will order oral MiraLAX and senna and Dulcolax suppository.      #Age-indeterminate T12 vertebral fracture-patient is now endorsing back pain and difficulty with getting up and walking.  -Lidocaine patch, Flexeril and Tylenol ordered.  -PT OT ordered.  -Discussed extensively with patient's daughter and son-in-law and they report that patient tends to get up and move around a lot even when repeatedly told not to and is quite restless at home.  -Patient is medically stable for discharge.  Disposition destination to be determined based on therapy.       Clinically Significant Risk Factors Present on Admission        # Hypokalemia: Lowest K = 3.3 mmol/L in last 2 days, will replace as needed         # Drug Induced Platelet Defect: home medication list includes an antiplatelet medication   # Hypertension: Noted on problem list        # Overweight: Estimated body mass index is 28.89 kg/m  as calculated from the following:    Height as of this encounter: 1.524 m (5').    Weight as of this encounter: 67.1 kg (147 lb 14.9 oz).               DVT Prophylaxis: Pneumatic Compression Devices  Code Status: Full Code  Medically Ready for Discharge: Anticipated Tomorrow    Greater than 35 minutes spent documentation review, imaging review and examining the patient and discussing care with patient's daughter and son-in-law over the phone    Marium Arroyo MD, MD  [phone](p)

## 2024-06-06 NOTE — PROGRESS NOTES
Observation goals  PRIOR TO DISCHARGE        Comments: -diagnostic tests and consults completed and resulted: not met, PT/OT consult pending   -vital signs normal or at patient baseline: not met   Nurse to notify provider when observation goals have been met and patient is ready for discharge.

## 2024-06-06 NOTE — PLAN OF CARE
Goal Outcome Evaluation:      Plan of Care Reviewed With: patient, caregiver      PRIMARY Concern: dizziness, hypertension and constipation for the past 1 week   Tests/Procedures for NEXT shift: none   Consults? (Pending/following, signed-off?): none   Safety Risk CONCERNS (fall risk, behaviors, etc.): fall risk      Where is patient from? (Home, TCU, etc.): home   Isolation/Type: none   Other Important info for next shift: daughter is PCA, speaks Farsi, needs interpretor   Anticipated DC date & active delays: TBD   SUMMARY NOTE:  Orientation/Cognitive: A/Ox4, forgetful, lethargic   Observation Goals (Met/ Not Met): not met   Mobility Level/Assist Equipment: A1 Gb/walker   Antibiotics & Plan (IV/po, length of tx left): cefTRIAXone (ROCEPHIN)   Pain Management: denies pain   Tele/VS/O2: VSS on 1L O2  ABNL Lab/BG: troponin 16  Diet: regular   Bowel/Bladder: purewick, incont of B/B  Skin Concerns: scattered abrasions   Drains/Devices: IV SL  Patient Stated Goal for Today: to feel better

## 2024-06-06 NOTE — PROGRESS NOTES
06/06/24 7331   Appointment Info   Signing Clinician's Name / Credentials (PT) Ashley French DPT   Quick Adds   Quick Adds Certification       Present yes  (Pt's daughter interpreting)   Language Far   Living Environment   People in Home alone   Current Living Arrangements apartment   Home Accessibility no concerns   Transportation Anticipated family or friend will provide   Self-Care   Usual Activity Tolerance moderate   Current Activity Tolerance poor   Equipment Currently Used at Home cane, straight   Fall history within last six months yes   Activity/Exercise/Self-Care Comment Pt's daughter is her PCA, provides whatever assist the pt requires, mostly bathing, cooking, cleaning. Pt uses [facility]   General Information   Onset of Illness/Injury or Date of Surgery 06/05/24   Referring Physician Marium Arroyo MD   Patient/Family Therapy Goals Statement (PT) Return home   Pertinent History of Current Problem (include personal factors and/or comorbidities that impact the POC) Aracelis Blakely is a 89 year old Farsi speaking female who presents to the ER accompanied by her daughter with complaints of dizziness, hypertension and constipation for the past 1 week.  Daughter helps with interpretation.  Patient apparently had a fall 2 days ago and was complaining of left hip pain but was ambulating normally.  Did go to TCU and had x-rays that did not show any fracture. Prior to arrival patient had a 30-minute episode of chest tightness and was found to have oxygen sats in the 90%'s on room air on arrival to the ER.  She was also endorsing some dizziness that started today.  At home daughter checked her blood pressure which was 220 systolic.  For the past 7 days she is also having severe constipation and has not even been passing gas.  Denies any fever, chills, nausea, vomiting or any history of blood clots.   Existing Precautions/Restrictions fall   Cognition   Affect/Mental Status  (Cognition) WFL   Orientation Status (Cognition) oriented x 3   Follows Commands (Cognition) follows one-step commands;over 90% accuracy   Pain Assessment   Patient Currently in Pain   (Pt reports pain on L side of back and abdomen)   Posture    Posture Forward head position;Protracted shoulders   Range of Motion (ROM)   Range of Motion ROM deficits secondary to weakness;ROM deficits secondary to pain   Strength (Manual Muscle Testing)   Strength (Manual Muscle Testing) Able to perform R SLR;Able to perform L SLR;Deficits observed during functional mobility   Bed Mobility   Comment, (Bed Mobility) Supine to sit Mod A   Transfers   Comment, (Transfers) Sit to stand Min A   Gait/Stairs (Locomotion)   Comment, (Gait/Stairs) Pt amb w/ FWW and Min A   Balance   Balance Comments Fair seated and standing w/ FWW   Sensory Examination   Sensory Perception patient reports no sensory changes   Clinical Impression   Criteria for Skilled Therapeutic Intervention Yes, treatment indicated   PT Diagnosis (PT) Impaired functional mobility and gait   Influenced by the following impairments Pain, weakness, decreased activity tolerance, impaired balance   Functional limitations due to impairments Limited functional mobility requiring AD and assist   Clinical Presentation (PT Evaluation Complexity) stable   Clinical Presentation Rationale Based on PMH, current status, and social support   Clinical Decision Making (Complexity) low complexity   Planned Therapy Interventions (PT) balance training;bed mobility training;gait training;strengthening;transfer training;progressive activity/exercise   Risk & Benefits of therapy have been explained evaluation/treatment results reviewed;care plan/treatment goals reviewed;risks/benefits reviewed;current/potential barriers reviewed;participants voiced agreement with care plan;participants included;patient;daughter   PT Total Evaluation Time   PT Eval, Low Complexity Minutes (41443) 10   Therapy  Certification   Start of care date 06/06/24   Certification date from 06/06/24   Certification date to 06/13/24   Medical Diagnosis Shortness of breath   Physical Therapy Goals   PT Frequency Daily   PT Predicted Duration/Target Date for Goal Attainment 06/13/24   PT Goals Bed Mobility;Transfers;Gait   PT: Bed Mobility Supervision/stand-by assist;Supine to/from sit   PT: Transfers Supervision/stand-by assist;Sit to/from stand;Assistive device   PT: Gait Supervision/stand-by assist;Assistive device;50 feet   Interventions   Interventions Quick Adds Therapeutic Activity   Therapeutic Activity   Therapeutic Activities: dynamic activities to improve functional performance Minutes (69103) 18   Symptoms Noted During/After Treatment Fatigue;Increased pain   Treatment Detail/Skilled Intervention Pt supine in bed upon therapist arrival, agreeable to PT. Pt's daughter present for session and interpreting. Pt sitting EOB, able to shift hips forward w/ Mod A. Pt performed sit <> stand w/ FWW and Min A. Pt amb 10' w/ FWW and Min A. Pt demos slow speed and fair stability. Pt fatigued. Pt sat EOB, able to shift hips up small amount w/ cues, then impulsively transfers to supine. Pt required Max A to resposition in bed, total assist x2 to boost. Pt in bed at end of session, all needs w/in reach, daughter at side, RN updated.   PT Discharge Planning   PT Plan Progress bed mob, transfers, amb w/ FWW   PT Discharge Recommendation (DC Rec) home with assist;home with home care physical therapy;Transitional Care Facility   PT Rationale for DC Rec Pt below baseline, currently Min-Mod A for mobility, unable to amb more than 10' w/ FWW. Pt's daughter is her PCA, if daughter able to provide 24/7 assist, anticipate pt able to return home. If not, consider TCU.   PT Brief overview of current status Min-Mod A   PT Equipment Needed at Discharge walker, standard   Total Session Time   Timed Code Treatment Minutes 18   Total Session Time (sum of  timed and untimed services) 28   [facility]  OUTPATIENT PHYSICAL THERAPY EVALUATION  PLAN OF TREATMENT FOR OUTPATIENT REHABILITATION  (COMPLETE FOR INITIAL CLAIMS ONLY)  Patient's Last Name, First Name, M.I.  YOB: 1922  Aracelis Shankar                           Provider's Name  [facility] Medical Record No.  [id number]                             Onset Date:  06/05/24   Start of Care Date:  06/06/24   Type:     _X_PT   ___OT   ___SLP Medical Diagnosis:  Shortness of breath              PT Diagnosis:  Impaired functional mobility and gait Visits from SOC:  1     See note for plan of treatment, functional goals and certification details    I CERTIFY THE NEED FOR THESE SERVICES FURNISHED UNDER        THIS PLAN OF TREATMENT AND WHILE UNDER MY CARE     (Physician co-signature of this document indicates review and certification of the therapy plan).

## 2024-06-06 NOTE — PROGRESS NOTES
Observation goals:    -diagnostic tests and consults completed and resulted- not met    -vital signs normal or at patient baseline- not met     Nurse to notify provider when observation goals have been met and patient is ready for discharge.

## 2024-06-06 NOTE — PROGRESS NOTES
Observation goals  PRIOR TO DISCHARGE        Comments: -diagnostic tests and consults completed and resulted: not met, PT complete  -vital signs normal or at patient baseline: not met, tachycardic, elevated BP   Nurse to notify provider when observation goals have been met and patient is ready for discharge.

## 2024-06-07 ENCOUNTER — PATIENT OUTREACH (OUTPATIENT)
Dept: GERIATRIC MEDICINE | Facility: CLINIC | Age: 89
End: 2024-06-07
Payer: COMMERCIAL

## 2024-06-07 PROCEDURE — 250N000013 HC RX MED GY IP 250 OP 250 PS 637: Performed by: HOSPITALIST

## 2024-06-07 PROCEDURE — 250N000011 HC RX IP 250 OP 636: Mod: JZ | Performed by: HOSPITALIST

## 2024-06-07 PROCEDURE — 99232 SBSQ HOSP IP/OBS MODERATE 35: CPT | Performed by: HOSPITALIST

## 2024-06-07 PROCEDURE — 96376 TX/PRO/DX INJ SAME DRUG ADON: CPT

## 2024-06-07 PROCEDURE — G0378 HOSPITAL OBSERVATION PER HR: HCPCS

## 2024-06-07 RX ORDER — AMOXICILLIN 250 MG
1 CAPSULE ORAL 2 TIMES DAILY PRN
Qty: 60 TABLET | Refills: 1 | Status: SHIPPED | OUTPATIENT
Start: 2024-06-07 | End: 2024-06-25

## 2024-06-07 RX ORDER — CYCLOBENZAPRINE HCL 10 MG
10 TABLET ORAL 3 TIMES DAILY
Qty: 30 TABLET | Refills: 0 | Status: SHIPPED | OUTPATIENT
Start: 2024-06-07 | End: 2024-06-21

## 2024-06-07 RX ORDER — CEFDINIR 300 MG/1
300 CAPSULE ORAL 2 TIMES DAILY
Qty: 6 CAPSULE | Refills: 0 | Status: SHIPPED | OUTPATIENT
Start: 2024-06-07 | End: 2024-06-10

## 2024-06-07 RX ORDER — LIDOCAINE 4 G/G
1 PATCH TOPICAL EVERY 24 HOURS
Qty: 15 PATCH | Refills: 0 | Status: SHIPPED | OUTPATIENT
Start: 2024-06-07 | End: 2024-06-24

## 2024-06-07 RX ORDER — AMLODIPINE BESYLATE 5 MG/1
5 TABLET ORAL DAILY
Qty: 60 TABLET | Refills: 2 | Status: SHIPPED | OUTPATIENT
Start: 2024-06-07 | End: 2024-06-24

## 2024-06-07 RX ADMIN — LIDOCAINE 1 PATCH: 560 PATCH PERCUTANEOUS; TOPICAL; TRANSDERMAL at 09:24

## 2024-06-07 RX ADMIN — CYCLOBENZAPRINE 10 MG: 10 TABLET, FILM COATED ORAL at 09:25

## 2024-06-07 RX ADMIN — PANTOPRAZOLE SODIUM 40 MG: 40 TABLET, DELAYED RELEASE ORAL at 09:25

## 2024-06-07 RX ADMIN — SERTRALINE HYDROCHLORIDE 50 MG: 50 TABLET ORAL at 09:25

## 2024-06-07 RX ADMIN — SENNOSIDES 8.6 MG: 8.6 TABLET, FILM COATED ORAL at 09:26

## 2024-06-07 RX ADMIN — AMLODIPINE BESYLATE 5 MG: 5 TABLET ORAL at 09:26

## 2024-06-07 RX ADMIN — SENNOSIDES 8.6 MG: 8.6 TABLET, FILM COATED ORAL at 20:20

## 2024-06-07 RX ADMIN — POLYETHYLENE GLYCOL 3350 17 G: 17 POWDER, FOR SOLUTION ORAL at 09:25

## 2024-06-07 RX ADMIN — ASPIRIN 81 MG: 81 TABLET, COATED ORAL at 09:25

## 2024-06-07 RX ADMIN — CEFTRIAXONE SODIUM 1 G: 1 INJECTION, POWDER, FOR SOLUTION INTRAMUSCULAR; INTRAVENOUS at 14:57

## 2024-06-07 RX ADMIN — CYCLOBENZAPRINE 10 MG: 10 TABLET, FILM COATED ORAL at 20:20

## 2024-06-07 RX ADMIN — CYCLOBENZAPRINE 10 MG: 10 TABLET, FILM COATED ORAL at 14:57

## 2024-06-07 ASSESSMENT — ACTIVITIES OF DAILY LIVING (ADL)
ADLS_ACUITY_SCORE: 50
ADLS_ACUITY_SCORE: 53
ADLS_ACUITY_SCORE: 50
ADLS_ACUITY_SCORE: 52
ADLS_ACUITY_SCORE: 52
ADLS_ACUITY_SCORE: 50
ADLS_ACUITY_SCORE: 53
ADLS_ACUITY_SCORE: 53
ADLS_ACUITY_SCORE: 52
ADLS_ACUITY_SCORE: 55
ADLS_ACUITY_SCORE: 52
ADLS_ACUITY_SCORE: 53
ADLS_ACUITY_SCORE: 50
ADLS_ACUITY_SCORE: 52
ADLS_ACUITY_SCORE: 50
ADLS_ACUITY_SCORE: 52
ADLS_ACUITY_SCORE: 50

## 2024-06-07 NOTE — UTILIZATION REVIEW
Concurrent stay review; Secondary Review Determination - [facility]        Under the authority of the Utilization Management Committee, the utilization review process indicated a secondary review on the above patient.  The review outcome is based on review of the medical records, discussions with staff, and applying clinical experience noted on the date of the review.        (x) Outpatient long term status is appropriate       RATIONALE FOR DETERMINATION:        The Patient is 89 y/o  woman,  presented to the ER with complaints of dizziness, uncontrolled hypertension (220 systolic), and severe constipation. She experienced a fall two days prior and has been ambulating normally despite left hip pain. Recent symptoms include a 30-minute episode of chest tightness and oxygen saturation in the 90%'s on room air. Lab results showed mildly elevated proBNP at 1485 and significantly elevated D-dimer at 2. CT angio chest was negative for pulmonary embolism but revealed septal thickening consistent with pulmonary edema, bilateral pleural effusions, and moderate stool in the rectum. An age-indeterminate T12 vertebral fracture was also suspected.  Medication doses were adjusted.  The family indicated that patient tends to get up and move around a lot even when repeatedly told not to and is quite restless at home.   The family requested no aggressive evaluation and the family prefers to discharge when the patient is stable, possible to TCU.  The patient is medically ready for discharge, when TCU bed available    Patient delayed discharge is related to disposition, there is no medical necessity for inpatient admission at the time of this review. If there is a change in patient status, please resend for review.    The information on this document is developed by the utilization review team in order for the business office to ensure compliance.  This only denotes the appropriateness of proper  admission status and does not reflect the quality of care rendered.       The definitions of Inpatient Status and Observation Status used in making the determination above are those provided in the CMS Coverage Manual, Chapter 1 and Chapter 6, section 70.4.       Sincerely,     NIYA BRIDGES MD   Utilization Review  Physician Advisor  [facility]

## 2024-06-07 NOTE — PROGRESS NOTES
Observation goals  PRIOR TO DISCHARGE       Comments:   -diagnostic tests and consults completed and resulted- Met   -vital signs normal or at patient baseline- Met

## 2024-06-07 NOTE — CONSULTS
Care Management Initial Consult    General Information  Assessment completed with: Children, Yoselin and Corbin  Type of CM/SW Visit: Initial Assessment    Primary Care Provider verified and updated as needed:     Readmission within the last 30 days:        Reason for Consult: discharge planning  Advance Care Planning:            Communication Assessment  Patient's communication style: spoken language (non-English)    Hearing Difficulty or Deaf: no   Wear Glasses or Blind: no    Cognitive  Cognitive/Neuro/Behavioral: .WDL except  Level of Consciousness: alert  Arousal Level: opens eyes spontaneously  Orientation: oriented x 4  Mood/Behavior: calm, cooperative  Best Language: 0 - No aphasia  Speech: clear, spontaneous    Living Environment:   People in home: alone     Current living Arrangements: apartment      Able to return to prior arrangements: other (see comments)  Living Arrangement Comments: PT recommending TCU vs home with family if they can assist    Family/Social Support:  Care provided by: child(griselda)  Provides care for: no one  Marital Status:   Children          Description of Support System: Supportive    Support Assessment: Adequate family and caregiver support    Current Resources:   Patient receiving home care services: No     Community Resources: PCA  Equipment currently used at home: cane, straight  Supplies currently used at home:      Employment/Financial:  Employment Status: retired        Financial Concerns:             Does the patient's insurance plan have a 3 day qualifying hospital stay waiver?  Yes     Which insurance plan 3 day waiver is available? Alternative insurance waiver    Will the waiver be used for post-acute placement? Yes    Lifestyle & Psychosocial Needs:  Social Determinants of Health     Food Insecurity: Low Risk  (1/11/2024)    Food Insecurity     Within the past 12 months, did you worry that your food would run out before you got money to buy more?: No     Within  the past 12 months, did the food you bought just not last and you didn t have money to get more?: No   Depression: Not at risk (3/13/2024)    PHQ-2     PHQ-2 Score: 0   Housing Stability: Low Risk  (1/11/2024)    Housing Stability     Do you have housing? : Yes     Are you worried about losing your housing?: No   Tobacco Use: Low Risk  (3/13/2024)    Patient History     Smoking Tobacco Use: Never     Smokeless Tobacco Use: Never     Passive Exposure: Not on file   Financial Resource Strain: Low Risk  (1/11/2024)    Financial Resource Strain     Within the past 12 months, have you or your family members you live with been unable to get utilities (heat, electricity) when it was really needed?: No   Alcohol Use: Not At Risk (1/11/2024)    AUDIT-C     Frequency of Alcohol Consumption: Never     Average Number of Drinks: Patient does not drink     Frequency of Binge Drinking: Never   Transportation Needs: Low Risk  (1/11/2024)    Transportation Needs     Within the past 12 months, has lack of transportation kept you from medical appointments, getting your medicines, non-medical meetings or appointments, work, or from getting things that you need?: No   Physical Activity: Not on file   Interpersonal Safety: Low Risk  (1/11/2024)    Interpersonal Safety     Do you feel physically and emotionally safe where you currently live?: Yes     Within the past 12 months, have you been hit, slapped, kicked or otherwise physically hurt by someone?: No     Within the past 12 months, have you been humiliated or emotionally abused in other ways by your partner or ex-partner?: No   Stress: Not on file   Social Connections: Unknown (1/11/2024)    Social Connection and Isolation Panel [NHANES]     Frequency of Communication with Friends and Family: More than three times a week     Frequency of Social Gatherings with Friends and Family: Not on file     Attends Yazdanism Services: Not on file     Active Member of Clubs or Organizations: Not  on file     Attends Club or Organization Meetings: Not on file     Marital Status:    Health Literacy: Not on file       Functional Status:  Prior to admission patient needed assistance:   Dependent ADLs:: Ambulation-cane, Ambulation-walker, Bathing, Dressing, Grooming, Eating, Incontinence, Transfers, Toileting  Dependent IADLs:: Cleaning, Cooking, Laundry, Shopping, Medication Management, Money Management, Meal Preparation, Incontinence, Transportation       Mental Health Status:          Chemical Dependency Status:                Values/Beliefs:  Spiritual, Cultural Beliefs, Sikhism Practices, Values that affect care:                 Additional Information:  Spoke with daughter Yoselin and son in law Corbin for initial consult. Pt was admitted on 6/5/24 with shortness of breath, uncontrolled hypertension, per H&P. Pt lives alone in an apartment, receives PCA care from daughter. Patient speaks Farsi and family interprets. Family confirmed they would like to plan on TCU. They are preferring [facility] as their top choices but will look into other options if they are needed. Discussed Medicare ratings to help determine facilities. Family then interested in [facility] and [facility] for alternative options. Family would like to be kept up to date once a facility is found. They would like pt to have a private room and are okay with possible out of pocket costs for a room fee. Sent referrals.    Yoselin [phone]  Corbin [phone]    MARTA Conrad  Social Work  [facility]

## 2024-06-07 NOTE — PROGRESS NOTES
[facility]    Hospitalist Progress Note    Interval History   Patient is awake and alert this morning.  Daughter at bedside.  Continuing to endorse lower back left-sided pain.  Activity level significantly reduced compared to baseline.  No fever or chills.    -Data reviewed today: I reviewed all new labs and imaging results over the last 24 hours. I personally reviewed the CT chest PE abdomen pelvis image(s) showing no PE.  A T12 vertebral fracture possibly acute, age-indeterminate.  New compared to 2021.  Pulmonary edema seen. .    Physical Exam   Temp: 97.2  F (36.2  C) Temp src: Oral BP: (!) 153/92 (Nurse was notify.) Pulse: 104   Resp: 18 SpO2: 95 % O2 Device: Nasal cannula Oxygen Delivery: 1 LPM  Vitals:    06/05/24 1017 06/05/24 1511   Weight: 65.3 kg (144 lb) 67.1 kg (147 lb 14.9 oz)     Vital Signs with Ranges  Temp:  [97.2  F (36.2  C)-98.8  F (37.1  C)] 97.2  F (36.2  C)  Pulse:  [103-111] 104  Resp:  [18] 18  BP: (137-167)/(73-92) 153/92  SpO2:  [90 %-95 %] 95 %  I/O last 3 completed shifts:  In: 100 [P.O.:100]  Out: 150 [Urine:150]    Physical Exam  Constitutional:       Comments: Old and frail   Cardiovascular:      Rate and Rhythm: Regular rhythm. Tachycardia present.      Pulses: Normal pulses.      Heart sounds: Murmur heard.   Pulmonary:      Effort: Pulmonary effort is normal. No respiratory distress.      Breath sounds: Normal breath sounds.   Abdominal:      General: Abdomen is flat. Bowel sounds are normal. There is no distension.      Tenderness: There is no abdominal tenderness. There is no guarding.   Skin:     General: Skin is warm and dry.   Neurological:      General: No focal deficit present.           Medications   Current Facility-Administered Medications   Medication Dose Route Frequency Provider Last Rate Last Admin     Current Facility-Administered Medications   Medication Dose Route Frequency Provider Last Rate Last Admin    amLODIPine (NORVASC) tablet 5 mg   5 mg Oral Daily Marium Arroyo MD   5 mg at 06/07/24 0926    aspirin EC tablet 81 mg  81 mg Oral Daily Marium Arroyo MD   81 mg at 06/07/24 0925    cefTRIAXone (ROCEPHIN) 1 g vial to attach to  mL bag for ADULTS or NS 50 mL bag for PEDS  1 g Intravenous Q24H Marium Arroyo MD   1 g at 06/06/24 1717    cyclobenzaprine (FLEXERIL) tablet 10 mg  10 mg Oral TID Marium Arroyo MD   10 mg at 06/07/24 0925    Lidocaine (LIDOCARE) 4 % Patch 1 patch  1 patch Transdermal Q24H Marium Arroyo MD   1 patch at 06/07/24 0924    pantoprazole (PROTONIX) EC tablet 40 mg  40 mg Oral QAM AC Marium Arroyo MD   40 mg at 06/07/24 0925    polyethylene glycol (MIRALAX) Packet 17 g  17 g Oral Daily Marium Arroyo MD   17 g at 06/07/24 0925    sennosides (SENOKOT) tablet 8.6 mg  8.6 mg Oral BID Marium Arroyo MD   8.6 mg at 06/07/24 0926    sertraline (ZOLOFT) tablet 50 mg  50 mg Oral Daily Marium Arroyo MD   50 mg at 06/07/24 0925       Data   Recent Labs   Lab 06/06/24  1925 06/06/24 0718 06/05/24  1039   WBC  --  10.9 10.8   HGB  --  13.0 12.9   MCV  --  85 85   PLT  --  185 162   NA  --  132* 133*   POTASSIUM 4.0 3.3* 3.7   CHLORIDE  --  94* 95*   CO2  --  27 26   BUN  --  14.3 11.3   CR  --  0.47* 0.48*   ANIONGAP  --  11 12   JINA  --  8.7 9.2   GLC  --  145* 185*   ALBUMIN  --   --  3.8   PROTTOTAL  --   --  6.8   BILITOTAL  --   --  1.1   ALKPHOS  --   --  66   ALT  --   --  14   AST  --   --  27   LIPASE  --   --  7*       No results found for this or any previous visit (from the past 24 hour(s)).      Assessment & Plan      Aracelis Blakely is a 89 year old Farsi speaking female who presents to the ER accompanied by her daughter with complaints of dizziness, hypertension and constipation for the past 1 week.  Daughter helps with interpretation.  Patient apparently had a fall 2 days ago and was complaining of left hip pain but was ambulating normally.  Did go to TCU and had x-rays  that did not show any fracture.     Prior to arrival patient had a 30-minute episode of chest tightness and was found to have oxygen sats in the 90%'s on room air on arrival to the ER.  She was also endorsing some dizziness that started today.  At home daughter checked her blood pressure which was 220 systolic.  For the past 7 days she is also having severe constipation and has not even been passing gas.  Denies any fever, chills, nausea, vomiting or any history of blood clots.       #1 chest discomfort with shortness of breath  Acute on chronic congestive heart failure  Uncontrolled hypertension  -Patient had mildly elevated proBNP at 1485 but significantly elevated D-dimer at 2.  CT angio chest for PE was done which was negative for pulmonary embolism but did show septal thickening consistent with pulmonary edema with bilateral pleural effusions and moderate amount of stool in the rectum.  There was also concern for T12 vertebral fracture that was age-indeterminate.  -Was given one-time dose of IV Lasix 20 mg and is being admitted to the hospital under observation for further evaluation of underlying CHF.  -EKG shows sinus tachycardia with left axis deviation.  -Troponin at 15--16  -Stress test in 2016 showed normal myocardial perfusion with EF of 68%.  -Will hold off on further echo.  Discussed with the daughter.  They do not want any aggressive evaluations and would actually prefer discharge possibly tomorrow if she remains stable.  -Monitor overnight when will optimize blood pressure medications  -Increased amlodipine to 5 mg daily for uncontrolled hypertension.  -Will hold off on further Lasix for now since on my clinical exam she appears euvolemic.     #Depression-on Zoloft.     #Anxiety-on as needed alprazolam.     #UTI-urinalysis was positive for infection.  Urine culture shows mixed edwina..  On 1 g IV ceftriaxone-transition to oral cefdinir 300 mg twice daily to finish a total 5-day course of  antibiotics.     #Severe constipation-Per daughter patient has not had a bowel movement for 5 to 7 days and is also not passing a lot of gas.  On my exam patient has soft abdomen with good bowel sounds.  -ET abdomen did show moderate amount of stool.  -Will order oral MiraLAX and senna and Dulcolax suppository.     #Age-indeterminate T12 vertebral fracture-patient is now endorsing back pain and difficulty with getting up and walking.  -Lidocaine patch, Flexeril and Tylenol ordered.  -PT OT ordered.  Recommending 24/7 home health versus TCU.  -Discussed extensively with patient's daughter and son-in-law and they report that patient tends to get up and move around a lot even when repeatedly told not to and is quite restless at home.  I personally recommended for the patient to go to TCU.  Family would like to discuss this with the  prior to making any plans.  Social work updated and will contact the family today.  -Patient is medically stable for discharge.  Disposition destination to be determined based on therapy.       Clinically Significant Risk Factors Present on Admission        # Hypokalemia: Lowest K = 3.3 mmol/L in last 2 days, will replace as needed         # Drug Induced Platelet Defect: home medication list includes an antiplatelet medication   # Hypertension: Noted on problem list          # Overweight: Estimated body mass index is 28.89 kg/m  as calculated from the following:    Height as of this encounter: 1.524 m (5').    Weight as of this encounter: 67.1 kg (147 lb 14.9 oz).               DVT Prophylaxis: Pneumatic Compression Devices  Code Status: Full Code  Medically Ready for Discharge: Ready Now.         Marium Arroyo MD, MD  [phone](p)

## 2024-06-07 NOTE — PLAN OF CARE
Goal Outcome Evaluation:  PRIMARY Concern: Dizziness, hypertension and constipation for the past 1 week   SAFETY RISK Concerns (fall risk, behaviors, etc.): Fall Risk    Isolation/Type: None   Tests/Procedures for NEXT shift: None   Consults? (Pending/following, signed-off?) PT following   Where is patient from? (Home, TCU, etc.): Home   Other Important info for NEXT shift: daughter is PCA, speaks Farsi, needs interpretor   Anticipated DC date & active delays: TBD     SUMMARY NOTE:    Orientation/Cognitive: A&O X4, Forgetful   Observation Goals (Met/ Not Met): Not met  Mobility Level/Assist Equipment: Ax1GB/Walker   Antibiotics & Plan (IV/po, length of tx left): IV cefTRIAXone   Pain Management: Denies pain   Tele/VS/O2: VSS on 1L NC  ABNL Lab/BG: Sodium= 132  Diet: Regular   Bowel/Bladder: Incontinent. Purewick in place   Skin Concerns: scattered abrasions   Drains/Devices: PIV SL   Patient Stated Goal for Today: None given           Observation goals  PRIOR TO DISCHARGE       Comments:   -diagnostic tests and consults completed and resulted- Met   -vital signs normal or at patient baseline- Met

## 2024-06-07 NOTE — PROGRESS NOTES
[facility] Care Coordination Contact    [facility]  Ambulatory Care Coordination to Inpatient Care Management   Hand-In Communication    Date:  June 7, 2024  Name: Aracelis Blakely is enrolled in [facility] Care Coordination program and I am the Lead Care Coordinator.  CC Contact Information: MARTA Lino [facility] [phone], Fax: [phone]     Payor Source: Payor: MEDICA / Plan: MEDICA [employer] / Product Type: HMO /     Current services in place:  11 hrs/day PCA ([facility]), Lifeline ([facility]), Monthly incontinent products ([employer]A). Daughter is the paid PCA. Family very involved and care for Aracelis.  Please see the CC Snaphot and Care Management Flowsheets for specific details of this Aracelis Blakely care plan.     Additional details/specific concerns r/t this admission:   I will follow this admission in Epic. Please feel free to contact me with questions or for further collaboration in discharge planning.    MARTA Lino  [facility]  [phone]  Fax: [phone]

## 2024-06-07 NOTE — PROGRESS NOTES
TRANSITIONS OF CARE (RADHA) LOG    RADHA tasks should be completed by the CC within one (1) business day of notification of each transition. Follow up contact with member is required after return to their usual care setting.  Note:  If CC finds out about the transitions fifteen (15) days or more after the member has returned to their usual care setting, no RADHA log is needed. However, the CC should check in with the member to discuss the transition process, any changes needed to the care plan and document it in a case note.     Member Name:  Aracelis Blakely MCO Name:  [employer] M[employer]/Health Plan Member ID#: [id number]   Product: [facility] Care Coordinator Contact:  MARTA Lino Agency/County/Care System: [place] [employer]   Transition Communication Actions from Care Management Contact   Transition #1   Notification Date: 06/06/2024 Transition Date:   06/05/2024 Transition From: Home     Is this the member s usual care setting?               yes Transition To: [facility]   Transition Type:  Unplanned    Documentation from conversation with the member/responsible party, provider, discharging and receiving facility:   Date: 06/07/24: Received notification of admission to hospital with dx of dizziness, hypertension and constipation for the past 1 week.   CC contacted Hospital /discharge planner, via the [employer] [facility]itional Care Hand-In Process, with community care plan included.  CC reached out to adult daughter Yoselin  regarding transition and offered support as needed. Will be meeting with the physician today to decide on discharge plans.  Reviewed and update care plan as needed.  Notified community service providers and placed services [employer] PCA [employer] ([facility]) on hold as needed.  Transition log initiated.   PCP, Ervin Ledesma, notified of hospitalization via EMR.  MARTA Lino [facility] [phone], Fax: [phone]    Transition #2    Notification Date: 06/10/2024 Transition Date:   06/08/2024 Transition From: [facility]     Is this the member s usual care setting?               [facility]   Transition Type:  Planned    Documentation from conversation with the member/responsible party, provider, discharging and receiving facility:   Date: 06/10/24: Received notification of admission TCU for rehab post hospital stay.  OBRA 1 right faxed to [facility] admissions office.   CC contacted [facility] (Kim [phone]) and left a message with this CC contact information, reviewed community POC as well requested to be notified of concerns, care conferences and discharge planning.  CC reached out to family son in-law   Corbin Islas ([phone]) regarding transition and offered support as needed. Requested to be updated re: any care conferences and discharge plans.  Transition log updated.   PCP, Ervin Ledesma, notified of transition to TCU via EMR.  Flora Luis [facility] [phone], Fax: [phone]    06/11/24: Received call from son in-law and TCU JOSE Kim. Care conference sched for Friday, June 14th at 1:00pm in room 2115. CC will provide phone . Flora Luis [facility] [phone], Fax: [phone]   Transition #3   Notification Date: 06/25/2024 Transition Date:   06/25/2024 Transition From: Nursing Home, [facility] SN[facility]     Is this the member s usual care setting?               no Transition To: Home   Transition Type:  Planned    Documentation from conversation with the member/responsible party, provider, discharging and receiving facility:   Date: 06/26/24: Received notification of transition to home.  CC contacted family son in-law  and reviewed discharge summary.  Member has a follow-up appointment with PCP in 7 days: Yes: scheduled on 06/27/24    Member has had a change in condition:  No  Home visit needed: No  Care plan reviewed and updated.  The following home based services [employer] ([facility]) were resumed.  New referrals placed: TCU made referral to home care. Son in-law was inquiring about installing a bidet or other DME that would help with independence while using the toilet. Is okay with having home care evaluate for appropriate DME. Son in-law will call CC w/ name of home care provider once known.  Transition log completed.   PCP, Ervin Ledesma, notified of transition back to home via EMR.  Flora Luis, [facility] [phone], Fax: [phone]     *RETURN TO USUAL CARE SETTING: *Complete tasks below when the member is discharging TO their usual care setting within one (1) business day of notification..      For situations where the Care Coordinator is notified of the discharge prior to the date of discharge, the Care Coordinator must follow up with the member or designated representative to confirm that discharge actually occurred and discuss required RADHA tasks as outlined in the RADHA Instructions.  (This includes situations where it may be a  new  usual care setting for the member. (i.e., a community member who decides upon permanent nursing home placement following hospitalization and rehab).    Discuss with Member/Responsible Party:    Check  Yes  - if the member, family member and/or SNF/facility staff manages the following:    If  No  provide explanation in the comments section.          Date completed: 06/26/2024 Communicated with member or their designated representative about the following:  care transition process; about changes to the member s health status; plan of care updates; education about transitions and how to prevent unplanned transitions/readmissions    Four Pillars for Optimal Transition:    Check  Yes  - if the member, family member and/or SNF/facility staff manages the following:    If  No  provide explanation in the comments section.           [x]  Yes     []  No Does the member have a follow-up appointment scheduled with primary care or specialist? (Mental health hospitalizations--the appt. should be w/in 7 days)              For mental health hospitalizations:  []  Yes     []  No     Does the member have a follow-up appointment scheduled with a mental health practitioner within 7 days of discharge?  [x]  Yes     []  No     Has a medication review been completed with member? If no, refer to PCP, home care nurse, MTM, pharmacist  [x]  Yes     []  No     Can the member manage their medications or is there a system in place to manage medications (e.g. home care set-up)?         [x]  Yes     []  No     Can the member verbalize warning signs and symptoms to watch for and how to respond?  [x]  Yes     []  No     Does the member have a copy of and understand their discharge instructions?  If no, assist to obtain copy of discharge instructions, review discharge instructions, and assist to contact PCP to discuss questions about their recent hospitalization.  [x]  Yes     []  No     Does the member have adequate food, housing and transportation?  If no, add goal and discuss additional supports available to the member                                                                                                                                                                                 [x]  Yes     []  No     Is the member safe in their home?  If no, document needs and support provided                                                                                                                                                                          []  Yes     [x]  No     Are there any concerns of vulnerability, abuse, or neglect?  If yes, document concerns and actions taken by Care Coordinator as a mandated                                                                                                                                                                               [x]  Yes     []  No     Does the member use a Personal Health Care Record?  Check  Yes  if visit summary, discharge summary, and/or healthcare summary are being used as a PHR.                                                                                                                                                                                  [x]  Yes     []  No     Have you reviewed the discharge summary with the member? If  No  provide explanation in comments.  [x]  Yes     []  No     Have you updated the member s care plan/support plan? Add new diagnosis, medications, treatments, goals & interventions, as applicable. If No, provide explanation in comments.  Flora Luis [facility] [phone], Fax: [phone]

## 2024-06-07 NOTE — PLAN OF CARE
Goal Outcome Evaluation:      Plan of Care Reviewed With: patient, family      Observation goals  PRIOR TO DISCHARGE       Comments:   -diagnostic tests and consults completed and resulted- Met   -vital signs normal or at patient baseline- Met     Orientation-AxOx4- forgetful- per family. Speaks Farsi.    Vitals/Tele-VSS on 1 L O2-when sleeping desaturates.    IV Access/drains-SL IV with intermittent antibiotics.    Diet-Regular-fair appetite.    Mobility-up with assist of 1 GB/walker.    GI/-incontinent-using purewick, no BM today.    Wound/Skin-intact.    Consults-PT/OT/    Discharge Plan-pending TCU placement      See Flow sheets for assessment

## 2024-06-08 VITALS
HEART RATE: 104 BPM | HEIGHT: 60 IN | BODY MASS INDEX: 29.04 KG/M2 | SYSTOLIC BLOOD PRESSURE: 126 MMHG | WEIGHT: 147.93 LBS | TEMPERATURE: 97.6 F | DIASTOLIC BLOOD PRESSURE: 74 MMHG | OXYGEN SATURATION: 96 % | RESPIRATION RATE: 18 BRPM

## 2024-06-08 LAB — POTASSIUM SERPL-SCNC: 4.2 MMOL/L (ref 3.4–5.3)

## 2024-06-08 PROCEDURE — 250N000013 HC RX MED GY IP 250 OP 250 PS 637: Performed by: HOSPITALIST

## 2024-06-08 PROCEDURE — 250N000011 HC RX IP 250 OP 636: Mod: JZ | Performed by: INTERNAL MEDICINE

## 2024-06-08 PROCEDURE — 84132 ASSAY OF SERUM POTASSIUM: CPT | Performed by: HOSPITALIST

## 2024-06-08 PROCEDURE — 99239 HOSP IP/OBS DSCHRG MGMT >30: CPT | Performed by: INTERNAL MEDICINE

## 2024-06-08 PROCEDURE — G0378 HOSPITAL OBSERVATION PER HR: HCPCS

## 2024-06-08 PROCEDURE — 999N000111 HC STATISTIC OT IP EVAL DEFER: Performed by: OCCUPATIONAL THERAPIST

## 2024-06-08 PROCEDURE — 250N000013 HC RX MED GY IP 250 OP 250 PS 637: Performed by: INTERNAL MEDICINE

## 2024-06-08 PROCEDURE — 36415 COLL VENOUS BLD VENIPUNCTURE: CPT | Performed by: HOSPITALIST

## 2024-06-08 RX ORDER — BISACODYL 10 MG
10 SUPPOSITORY, RECTAL RECTAL DAILY PRN
DISCHARGE
Start: 2024-06-08 | End: 2024-06-24

## 2024-06-08 RX ORDER — FUROSEMIDE 20 MG
20 TABLET ORAL
DISCHARGE
Start: 2024-06-08 | End: 2024-06-24

## 2024-06-08 RX ORDER — POLYETHYLENE GLYCOL 3350 17 G/17G
17 POWDER, FOR SOLUTION ORAL 2 TIMES DAILY
DISCHARGE
Start: 2024-06-08

## 2024-06-08 RX ORDER — FUROSEMIDE 40 MG
40 TABLET ORAL ONCE
Status: COMPLETED | OUTPATIENT
Start: 2024-06-08 | End: 2024-06-08

## 2024-06-08 RX ORDER — ALPRAZOLAM 0.25 MG
0.25 TABLET ORAL 3 TIMES DAILY PRN
Qty: 10 TABLET | Refills: 0 | Status: SHIPPED | OUTPATIENT
Start: 2024-06-08 | End: 2024-06-24

## 2024-06-08 RX ORDER — FUROSEMIDE 10 MG/ML
20 INJECTION INTRAMUSCULAR; INTRAVENOUS ONCE
Status: COMPLETED | OUTPATIENT
Start: 2024-06-08 | End: 2024-06-08

## 2024-06-08 RX ADMIN — CYCLOBENZAPRINE 10 MG: 10 TABLET, FILM COATED ORAL at 12:45

## 2024-06-08 RX ADMIN — FUROSEMIDE 40 MG: 40 TABLET ORAL at 12:45

## 2024-06-08 RX ADMIN — ASPIRIN 81 MG: 81 TABLET, COATED ORAL at 10:18

## 2024-06-08 RX ADMIN — AMLODIPINE BESYLATE 5 MG: 5 TABLET ORAL at 10:18

## 2024-06-08 RX ADMIN — SENNOSIDES 8.6 MG: 8.6 TABLET, FILM COATED ORAL at 10:18

## 2024-06-08 RX ADMIN — POLYETHYLENE GLYCOL 3350 17 G: 17 POWDER, FOR SOLUTION ORAL at 10:19

## 2024-06-08 RX ADMIN — SERTRALINE HYDROCHLORIDE 50 MG: 50 TABLET ORAL at 10:18

## 2024-06-08 RX ADMIN — LIDOCAINE 1 PATCH: 560 PATCH PERCUTANEOUS; TOPICAL; TRANSDERMAL at 10:24

## 2024-06-08 RX ADMIN — CYCLOBENZAPRINE 10 MG: 10 TABLET, FILM COATED ORAL at 10:18

## 2024-06-08 RX ADMIN — PANTOPRAZOLE SODIUM 40 MG: 40 TABLET, DELAYED RELEASE ORAL at 06:45

## 2024-06-08 ASSESSMENT — ACTIVITIES OF DAILY LIVING (ADL)
ADLS_ACUITY_SCORE: 54
ADLS_ACUITY_SCORE: 55
ADLS_ACUITY_SCORE: 54

## 2024-06-08 NOTE — PROGRESS NOTES
Care Management Discharge Note    Discharge Date: 06/08/2024       Discharge Disposition: Transitional Care    Discharge Services:      Discharge DME:      Discharge Transportation: family or friend will provide    Private pay costs discussed: transportation costs    Does the patient's insurance plan have a 3 day qualifying hospital stay waiver?  No    PAS Confirmation Code:  [id number]  Patient/family educated on Medicare website which has current facility and service quality ratings: yes    Education Provided on the Discharge Plan:    Persons Notified of Discharge Plans: Patient/children  Patient/Family in Agreement with the Plan: yes    Handoff Referral Completed: Yes    Additional Information:  [facility] informed [facility] can accept patient today. SW received discharge orders and faxed to facility. JOSE spoke with patient and children Yoselin and Corbin who are both in agreement. SW discussed transport and they would like  w/c transport and patient has MA and it is anticipated transport would be covered. JOSE scheduled MH w/c transport for [id number]. Sw updated patient/family/facility on transport time and all are in agreement.     PAS-RR    D: Per DHS regulation, JOSE completed and submitted PAS-RR to [place] [employer]ging Direct Connect via the Senior LinkAge Line.  PAS-RR confirmation # is : [id number]    I: SW spoke with patient and they are aware a PAS-RR has been submitted.  JOSE reviewed with patient that they may be contacted for a follow up appointment within 10 days of hospital discharge if their SNF stay is < 30 days.  Contact information for Senior [employer] Line was also provided.    A: patient verbalized understanding.    P: Further questions may be directed to Senior [employer]Age Line at #[phone], option #4 for PAS-RR staff.      MARTA Simms    [facility]

## 2024-06-08 NOTE — PLAN OF CARE
Physical Therapy Discharge Summary    Reason for therapy discharge:    Discharged to transitional care facility.    Progress towards therapy goal(s). See goals on Care Plan in [facility] electronic health record for goal details.  Goals partially met.  Barriers to achieving goals:   discharge from facility.    Therapy recommendation(s):    Continued therapy is recommended.  Rationale/Recommendations:  Pt below baseline, currently Min-Mod A for mobility, unable to amb more than 10' w/ FWW. Pt's daughter is her PCA, if daughter able to provide 24/7 assist, anticipate pt able to return home. If not, consider TCU.      *Discharging physical therapist did not work directly with patient. Therapy recommendation(s) taken from previous treating therapist's treatment note from last treatment session.

## 2024-06-08 NOTE — PLAN OF CARE
Goal Outcome Evaluation:    PRIMARY Concern: Dizziness, hypertension and constipation for the past 1 week   SAFETY RISK Concerns (fall risk, behaviors, etc.): Fall Risk    Isolation/Type: None   Tests/Procedures for NEXT shift: AM labs   Consults? (Pending/following, signed-off?) PT recommends TCU. SW following.  Where is patient from? (Home, TCU, etc.): Home   Other Important info for NEXT shift: Farsi speaking, needs interpretor  Anticipated DC date & active delays: TBD    SUMMARY NOTE:    Orientation/Cognitive: A&OX4, Forgetful   Observation Goals (Met/ Not Met): Not met  Mobility Level/Assist Equipment: Ax1GB/Walker, T/R   Antibiotics & Plan (IV/po, length of tx left): IV rocephin  Pain Management: Denies pain  Tele/VS/O2: VSS. Baseline on 2L NC in AM and at HS   ABNL Lab/BG: Na 132, creat 0.74, trop 16, UA abnormal  Diet: Regular   Bowel/Bladder: Incontinent. Purewick in place. No BM this shift.   Skin Concerns: trace BLE edema  Drains/Devices: PIV SL   Patient Stated Goal for Today: sleep     Observation goals  PRIOR TO DISCHARGE       Comments: -diagnostic tests and consults completed and resulted: not met  -vital signs normal or at patient baseline: met  Nurse to notify provider when observation goals have been met and patient is ready for discharge.

## 2024-06-08 NOTE — PLAN OF CARE
Occupational Therapy: Orders received. Chart reviewed and discussed with care team.? Occupational Therapy not indicated due to plan to discharge to TCU in a couple hours, will defer to next level of care.? Defer discharge recommendations to medical team.? Will complete orders.

## 2024-06-08 NOTE — PROGRESS NOTES
Observation goals  PRIOR TO DISCHARGE       Comments: -diagnostic tests and consults completed and resulted: not met  -vital signs normal or at patient baseline: met  Nurse to notify provider when observation goals have been met and patient is ready for discharge.

## 2024-06-08 NOTE — PROGRESS NOTES
Observation goals  PRIOR TO DISCHARGE        Comments: -diagnostic tests and consults completed and resulted- not met  -vital signs normal or at patient baseline- met  Nurse to notify provider when observation goals have been met and patient is ready for discharge.

## 2024-06-08 NOTE — PLAN OF CARE
Goal Outcome Evaluation:     PRIMARY Concern: Dizziness, hypertension and constipation for the past 1 week   SAFETY RISK Concerns (fall risk, behaviors, etc.): Fall Risk    Isolation/Type: None   Tests/Procedures for NEXT shift: K+ protocol  Consults? (Pending/following, signed-off?) PT recommends TCU. SW following.  Where is patient from? (Home, TCU, etc.): Home   Other Important info for NEXT shift: Farsi speaking, needs interpretor, family at bedside  Anticipated DC date & active delays: Discharging to Post at this time w/ w/c transport & family     SUMMARY NOTE:     Orientation/Cognitive: A&OX4, Forgetful   Observation Goals (Met/ Not Met): Met  Mobility Level/Assist Equipment: Asstx1-2 GB/walker to BSC, turn & repo w/ assistance   Antibiotics & Plan (IV/po, length of tx left): IV meds discontinued, switch to PO augmentin  Pain Management: c/o pain to L hip, L lateral thigh, L gluteal area; lidocaine applied w/ good effect  Tele/VS/O2: VSS on Rm Air during the day, 2L O2 via NC during NOC  ABNL Lab/BG: K+ stable, on replacement protocol  Diet: Regular   Bowel/Bladder: Incontinent. Purewick in place. No BM this shift. Bowel regimen per care plan  Skin Concerns: trace BLE edema  Drains/Devices: PIV removed from L arm; small bump to L arm from previous IV site  Patient Stated Goal for Today: pain control, BM      Observation goals  PRIOR TO DISCHARGE       Comments:   -diagnostic tests and consults completed and resulted: met  -vital signs normal or at patient baseline: met  Nurse to notify provider when observation goals have been met and patient is ready for discharge

## 2024-06-08 NOTE — PROGRESS NOTES
PRIMARY Concern: Dizziness, hypertension and constipation for the past 1 week   SAFETY RISK Concerns (fall risk, behaviors, etc.): Fall Risk    Isolation/Type: None   Tests/Procedures for NEXT shift: None   Consults? (Pending/following, signed-off?) PT recommends TCU. SW following.  Where is patient from? (Home, TCU, etc.): Home   Other Important info for NEXT shift: daughter is PCA, speaks Farsi, daughter at bedside during day, helps with interprets   Anticipated DC date & active delays: TCU placement pending. SW following.    SUMMARY NOTE:    Orientation/Cognitive: A&O X4, Forgetful   Observation Goals (Met/ Not Met): Not met  Mobility Level/Assist Equipment: Ax1GB/Walker   Antibiotics & Plan (IV/po, length of tx left): IV cefTRIAXone   Pain Management: Denies pain. Lidocaine patch off.  Tele/VS/O2: VSS on 1-2L NC at baseline during morning and night per daughter.  ABNL Lab/BG: Sodium-132  Diet: Regular   Bowel/Bladder: Incontinent. Purewick in place. No BM this shift.   Skin Concerns: scattered abrasions   Drains/Devices: PIV SL   Patient Stated Goal for Today: None given       Observation goals  PRIOR TO DISCHARGE       Comments: -diagnostic tests and consults completed and resulted- not met. TCU placement pending.  -vital signs normal or at patient baseline- met  Nurse to notify provider when observation goals have been met and patient is ready for discharge.

## 2024-06-08 NOTE — DISCHARGE SUMMARY
[facility]    Discharge Summary  Hospitalist    Date of Admission:  6/5/2024  Date of Discharge:  6/8/2024  Discharging Provider: Cuca Isabel MD    Discharge Diagnoses   Acute on chronic congestive heart failure    History of Present Illness   Please review admission history and physical.    Hospital Course   Aracelis Blakely was admitted on 6/5/2024.  The following problems were addressed during her hospitalization:    Active Problems:    Shortness of breath    Acute pulmonary edema (H)    Acute cystitis with hematuria    Acute on chronic congestive heart failure, unspecified heart failure type (H)    Aracelis Blakely is a 89 year old Farsi speaking female who presents to the ER accompanied by her daughter with complaints of dizziness, hypertension and constipation for the past 1 week.  Daughter helps with interpretation.  Patient apparently had a fall 2 days ago and was complaining of left hip pain but was ambulating normally.  Did go to TCU and had x-rays that did not show any fracture.Prior to arrival patient had a 30-minute episode of chest tightness and was found to have oxygen sats in the 90%'s on room air on arrival to the ER.  She was also endorsing some dizziness that started today.  At home daughter checked her blood pressure which was 220 systolic.  For the past 7 days she is also having severe constipation and has not even been passing gas.  Denies any fever, chills, nausea, vomiting or any history of blood clots.        chest discomfort with shortness of breath  Acute on chronic congestive heart failure  Uncontrolled hypertension  -Patient had mildly elevated proBNP at 1485 but significantly elevated D-dimer at 2.  CT angio chest for PE was done which was negative for pulmonary embolism but did show septal thickening consistent with pulmonary edema with bilateral pleural effusions and moderate amount of stool in the rectum.  There was also concern for T12  vertebral fracture that was age-indeterminate.  -Was given one-time dose of IV Lasix 20 mg and is being admitted to the hospital under observation for further evaluation of underlying CHF.  Patient was discharged on Lasix 20 mg every other day dosage, will need to recheck creatinine in few days.  Troponin remained between 15-16.  -EKG shows sinus tachycardia with left axis deviation.  -Stress test in 2016 showed normal myocardial perfusion with EF of 68%.  Echo was not obtained this admission and as per discussion with the daughter, They do not want any aggressive evaluations and would actually prefer discharge possibly tomorrow if she remains stable.  Patient was monitored overnight, she had some back pain but no chest pain.  Blood pressures were much controlled with the increased dose of amlodipine to 5 mg daily.     #Depression-on Zoloft.  Continue that     #Anxiety-on as needed alprazolam.     #UTI-urinalysis was positive for infection.  Urine culture shows mixed edwina..  Received a few doses of ceftriaxone but antibiotics discontinued on discharge.     #Severe constipation-Per daughter patient has not had a bowel movement for 5 to 7 days and is also not passing a lot of gas.  On my exam patient has soft abdomen with good bowel sounds.  -ET abdomen did show moderate amount of stool.  Patient received MiraLAX here, senna given, will discharge with the above medications along with Dulcolax suppository to the TCU.     #Age-indeterminate T12 vertebral fracture-patient is now endorsing back pain and difficulty with getting up and walking.  -Lidocaine patch, Flexeril and Tylenol ordered.  -PT OT ordered.  They recommended home health care versus TCU, plan is to discharge patient to TCU.  Cuca Isabel MD    Significant Results and Procedures       Pending Results     Unresulted Labs Ordered in the Past 30 Days of this Admission       No orders found from 5/6/2024 to 6/6/2024.            Code Status   Full Code        Primary Care Physician   Ervin Ledesma    Physical Exam   Temp: 97.6  F (36.4  C) Temp src: Oral BP: 126/74 Pulse: 104   Resp: 18 SpO2: 96 % O2 Device: None (Room air) Oxygen Delivery: 2 LPM  Vitals:    06/05/24 1017 06/05/24 1511   Weight: 65.3 kg (144 lb) 67.1 kg (147 lb 14.9 oz)     Vital Signs with Ranges  Temp:  [97.4  F (36.3  C)-98.8  F (37.1  C)] 97.6  F (36.4  C)  Pulse:  [] 104  Resp:  [16-18] 18  BP: (110-155)/(70-83) 126/74  SpO2:  [95 %-98 %] 96 %  I/O last 3 completed shifts:  In: 580 [P.O.:580]  Out: 850 [Urine:850]    The patient was examined on the day of discharge.    Discharge Disposition   Discharged to nursing home  Condition at discharge: Stable    Consultations This Hospital Stay   ADVANCE DIRECTIVE IP CONSULT  PHYSICAL THERAPY ADULT IP CONSULT  OCCUPATIONAL THERAPY ADULT IP CONSULT  VASCULAR ACCESS ADULT IP CONSULT  CARE MANAGEMENT / SOCIAL WORK IP CONSULT  PHYSICAL THERAPY ADULT IP CONSULT  OCCUPATIONAL THERAPY ADULT IP CONSULT    Time Spent on this Encounter   I, Cuca Isabel MD, personally saw the patient today and spent greater than 30 minutes discharging this patient.    Discharge Orders      Reason for your hospital stay    [place]     Activity    Your activity upon discharge: activity as tolerated     General info for SNF    Length of Stay Estimate: Short Term Care: Estimated # of Days <30  Condition at Discharge: Improving  Level of care:skilled   Rehabilitation Potential: Good  Admission H&P remains valid and up-to-date: Yes  Recent Chemotherapy: N/A  Use Nursing Home Standing Orders: Yes     Mantoux instructions    Give two-step Mantoux (PPD) Per Facility Policy Yes     Follow Up and recommended labs and tests    Follow up with skilled nursing physician.  The following labs/tests are recommended: basic metabolic panel in 4-5 days, stop lasix if creatinine is going up..     Intake and output    Every shift     Daily weights    Call Provider for weight gain of  more than 2 pounds per day or 5 pounds per week.     Activity - Up with nursing assistance     Full Code     Physical Therapy Adult Consult    Evaluate and treat as clinically indicated.    Reason:  deconditioning     Occupational Therapy Adult Consult    Evaluate and treat as clinically indicated.    Reason:  deconditioning     Oxygen (SNF/TCU) Discharge     Fall precautions     Diet    Follow this diet upon discharge: Orders Placed This Encounter      Regular Diet Adult      Diet     Discharge Medications   Current Discharge Medication List        START taking these medications    Details   cefdinir (OMNICEF) 300 MG capsule Take 1 capsule (300 mg) by mouth 2 times daily for 3 days  Qty: 6 capsule, Refills: 0    Associated Diagnoses: Acute on chronic congestive heart failure, unspecified heart failure type (H); Acute cystitis with hematuria; Constipation, unspecified constipation type      cyclobenzaprine (FLEXERIL) 10 MG tablet Take 1 tablet (10 mg) by mouth 3 times daily  Qty: 30 tablet, Refills: 0    Associated Diagnoses: Acute on chronic congestive heart failure, unspecified heart failure type (H); Acute cystitis with hematuria; Constipation, unspecified constipation type      furosemide (LASIX) 20 MG tablet Take 1 tablet (20 mg) by mouth every 48 hours    Associated Diagnoses: Acute on chronic congestive heart failure, unspecified heart failure type (H)      Lidocaine (LIDOCARE) 4 % Patch Place 1 patch onto the skin every 24 hours To prevent lidocaine toxicity, patient should be patch free for 12 hrs daily.  Qty: 15 patch, Refills: 0    Associated Diagnoses: Acute on chronic congestive heart failure, unspecified heart failure type (H); Acute cystitis with hematuria; Constipation, unspecified constipation type      senna-docusate (SENOKOT-S/PERICOLACE) 8.6-50 MG tablet Take 1 tablet by mouth 2 times daily as needed for constipation  Qty: 60 tablet, Refills: 1    Associated Diagnoses: Acute on chronic congestive  heart failure, unspecified heart failure type (H); Acute cystitis with hematuria; Constipation, unspecified constipation type           CONTINUE these medications which have CHANGED    Details   amLODIPine (NORVASC) 5 MG tablet Take 1 tablet (5 mg) by mouth daily  Qty: 60 tablet, Refills: 2    Associated Diagnoses: Acute on chronic congestive heart failure, unspecified heart failure type (H); Acute cystitis with hematuria; Constipation, unspecified constipation type           CONTINUE these medications which have NOT CHANGED    Details   acetaminophen (TYLENOL) 325 MG tablet Take 325-650 mg by mouth every 4 hours as needed for mild pain Max acetaminophen dose: 4000 mg in 24 hrs      ALPRAZolam (XANAX) 0.25 MG tablet Take 0.25 mg by mouth 3 times daily as needed for anxiety      aspirin (ASPIRIN LOW DOSE) 81 MG EC tablet TAKE 1 TABLET BY MOUTH EVERY DAY WITH FOOD  Qty: 90 tablet, Refills: 1    Associated Diagnoses: Hypertension, unspecified type      calcium carbonate 750 MG CHEW Take 750 mg by mouth as needed      calcium carbonate-vitamin D (OSCAL) 500-5 MG-MCG tablet Take 1 tablet by mouth 2 times daily (with meals)  Qty: 180 tablet, Refills: 1    Associated Diagnoses: Osteoporosis without current pathological fracture, unspecified osteoporosis type      cholecalciferol (VITAMIN D3) 1000 UNIT tablet Take 1,000 Units by mouth daily       cyanocobalamin (VITAMIN B-12) 1000 MCG tablet Take 1,000 mcg by mouth daily      diclofenac (VOLTAREN) 1 % topical gel Apply 4 g topically 2 times daily  Qty: 200 g, Refills: 0    Associated Diagnoses: Closed compression fracture of L4 lumbar vertebra with routine healing, subsequent encounter      Multiple Vitamins-Minerals (CENTRUM FRESH/FRUITY 50+ PO) Take 1 tablet by mouth daily      olopatadine (PATADAY) 0.2 % ophthalmic solution Place 1 drop into both eyes as needed      Omega-3 Fatty Acids (OMEGA-3 FISH OIL) 1200 MG CAPS Take 1,200 mg by mouth daily       omeprazole  (PRILOSEC) 20 MG DR capsule TAKE 1 CAPSULE BY MOUTH EVERY DAY BEFORE A MEAL  Qty: 90 capsule, Refills: 1    Associated Diagnoses: Gastroesophageal reflux disease without esophagitis      sennosides (SENOKOT) 8.6 MG tablet TAKE 3 TABLETS BY MOUTH EVERY DAY  Qty: 270 tablet, Refills: 1    Associated Diagnoses: Constipation, unspecified constipation type      sertraline (ZOLOFT) 50 MG tablet Take 1 tablet (50 mg) by mouth daily  Qty: 90 tablet, Refills: 1    Associated Diagnoses: Depression, unspecified depression type      UNKNOWN TO PATIENT Patient's daughter says she uses an inhaler with a capsule, but could not supply any more information.      Ascorbic Acid (VITAMIN C/BIOFLAVONOIDS/ROSEHP PO) Take 1 tablet by mouth daily       azelastine (OPTIVAR) 0.05 % SOLN ophthalmic solution Place 1 drop into both eyes 2 times daily      gabapentin (NEURONTIN) 100 MG capsule Take 1 capsule (100 mg) by mouth nightly as needed for neuropathic pain  Qty: 60 capsule, Refills: 1    Associated Diagnoses: Closed compression fracture of L4 lumbar vertebra with routine healing, subsequent encounter      guaiFENesin (MUCINEX) 600 MG 12 hr tablet Take 1,200 mg by mouth 2 times daily as needed for congestion      Multiple Vitamin (DAILY-NETTA MULTIVITAMIN) TABS Take 1 tablet by mouth daily  Qty: 90 tablet, Refills: 2    Associated Diagnoses: Osteoporosis without current pathological fracture, unspecified osteoporosis type      !! order for DME WALKER; with wheels, and brakes, and a seat.  Qty: 1 Device, Refills: 0    Associated Diagnoses: Falls frequently; Poor balance      !! order for DME Equipment being ordered:  Compression stockings,knee high,mild compression  Qty: 3 Units, Refills: 3    Associated Diagnoses: Varicose veins of both lower extremities, unspecified whether complicated      !! order for DME Knee high compression stockings.  Light compression.  16-20mm Hg.  Ok 3 pairs.      OVER-THE-COUNTER 550 mg 3 times daily as needed  (Takes 3x daily when senokot isn't available or if senokot doesn't work for her.)      polyethylene glycol (MIRALAX) 17 GM/Dose powder Take 17 g by mouth daily as needed for constipation  Qty: 510 g, Refills: 0    Associated Diagnoses: Constipation, unspecified constipation type       !! - Potential duplicate medications found. Please discuss with provider.        STOP taking these medications       albuterol (2.5 MG/3ML) 0.083% neb solution Comments:   Reason for Stopping:         ciclopirox (PENLAC) 8 % external solution Comments:   Reason for Stopping:         Efinaconazole 10 % SOLN Comments:   Reason for Stopping:         lidocaine (LIDODERM) 5 % patch Comments:   Reason for Stopping:             Allergies   No Known Allergies  Data   Most Recent 3 CBC's:  Recent Labs   Lab Test 06/06/24  0718 06/05/24  1039 03/13/24  1100   WBC 10.9 10.8 5.3   HGB 13.0 12.9 13.1   MCV 85 85 87    162 194      Most Recent 3 BMP's:  Recent Labs   Lab Test 06/08/24  0642 06/06/24  1925 06/06/24 0718 06/05/24  1039 03/13/24  1100   NA  --   --  132* 133* 141   POTASSIUM 4.2 4.0 3.3* 3.7 4.6   CHLORIDE  --   --  94* 95* 103   CO2  --   --  27 26 30*   BUN  --   --  14.3 11.3 15.2   CR  --   --  0.47* 0.48* 0.70   ANIONGAP  --   --  11 12 8   JINA  --   --  8.7 9.2 9.6   GLC  --   --  145* 185* 96     Most Recent 2 LFT's:  Recent Labs   Lab Test 06/05/24  1039 05/21/21 2008   AST 27 15   ALT 14 18   ALKPHOS 66 63   BILITOTAL 1.1 1.0     Most Recent INR's and Anticoagulation Dosing History:  Anticoagulation Dose History           No data to display              Most Recent 3 Troponin's:  Recent Labs   Lab Test 09/17/16  1623   TROPI 0.023     Most Recent Cholesterol Panel:  Recent Labs   Lab Test 08/20/19  1558   CHOL 213*   *   HDL 47*   TRIG 136     Most Recent 6 Bacteria Isolates From Any Culture (See EPIC Reports for Culture Details):No lab results found.  Most Recent TSH, T4 and A1c Labs:  Recent Labs   Lab Test  02/16/22  1019   TSH 2.38     Results for orders placed or performed during the hospital encounter of 06/05/24   CT Chest (PE) Abdomen Pelvis w Contrast    Narrative    CT CHEST PE, ABDOMEN & PELVIS WITH CONTRAST June 5, 2024 12:52 PM    CLINICAL HISTORY: Chest and abdominal pain. Constipation.    TECHNIQUE: CT angiogram chest and routine CT abdomen pelvis with IV  contrast. Arterial phase through the chest and venous phase through  the abdomen and pelvis. 2D and 3D MIP reconstructions were preformed  by the CT technologist. Dose reduction techniques were used.   CONTRAST: 72 mL [id number].    COMPARISON: CT of the abdomen and pelvis 5/21/2021.    FINDINGS:  ANGIOGRAM CHEST: Pulmonary arteries are normal caliber and negative  for pulmonary emboli. Moderate atherosclerotic calcification of the  thoracic aorta. Ectasia of the ascending thoracic aorta measures 3.7  cm.    LUNGS AND PLEURA: Trace bilateral pleural effusions. Mild interlobular  septal thickening bilaterally consistent with pulmonary edema.    MEDIASTINUM/AXILLAE: No enlarged lymph nodes in the chest. No  pericardial effusion.    CORONARY ARTERY CALCIFICATION: Mild.    HEPATOBILIARY: Cholecystectomy. Mild intra and extrahepatic biliary  prominence is unchanged, and may be related to the patient's age and  postcholecystectomy state. No hepatic masses.    PANCREAS: Normal.    SPLEEN: Curvilinear calcification about the superior anterior aspect  of the spleen is unchanged, likely related to old subcapsular  collection.    ADRENAL GLANDS: Normal.    KIDNEYS/BLADDER: Small bilateral renal cysts would require no specific  follow-up. No hydronephrosis.    BOWEL: Moderate amount of stool in the rectum. No bowel obstruction.  No evidence for colitis or diverticulitis. Unremarkable appendix.    PELVIC ORGANS: Hysterectomy. No free fluid in the pelvis.    LYMPH NODES: No lymphadenopathy.    VASCULATURE: Moderate atherosclerotic aortoiliac  calcification.    ADDITIONAL FINDINGS: None.    MUSCULOSKELETAL: Degenerative changes in the thoracolumbar spine.  Irregular horizontal linear lucency in the T12 vertebral body is new  since the previous exam, and is consistent with an age indeterminate  fracture, possibly acute. Moderate compression of the L4 vertebral  body is unchanged.      Impression    IMPRESSION: No evidence for pulmonary embolism.  1.  Irregular horizontal linear lucency in the T12 vertebral body is  consistent with an age-indeterminate fracture, possibly acute. This  fracture is new compared to 5/21/2021.  2.  Smooth interlobular septal thickening in both lungs is consistent  with pulmonary edema.  3.  Trace bilateral pleural effusions.  4.  Moderate amount of stool in the rectum.  5.  Ectasia of the ascending thoracic aorta measures 3.7 cm.    LETHA CHANG MD         SYSTEM ID:  [id number]

## 2024-06-08 NOTE — PLAN OF CARE
Goal Outcome Evaluation:    Observation goals  PRIOR TO DISCHARGE       Comments:   -diagnostic tests and consults completed and resulted- met  -vital signs normal or at patient baseline- met  Nurse to notify provider when observation goals have been met and patient is ready for discharge

## 2024-06-09 ENCOUNTER — DOCUMENTATION ONLY (OUTPATIENT)
Dept: GERIATRICS | Facility: CLINIC | Age: 89
End: 2024-06-09
Payer: COMMERCIAL

## 2024-06-09 ENCOUNTER — LAB REQUISITION (OUTPATIENT)
Dept: LAB | Facility: CLINIC | Age: 89
End: 2024-06-09

## 2024-06-09 DIAGNOSIS — Z11.1 ENCOUNTER FOR SCREENING FOR RESPIRATORY TUBERCULOSIS: ICD-10-CM

## 2024-06-10 PROCEDURE — 86481 TB AG RESPONSE T-CELL SUSP: CPT | Performed by: PHYSICIAN ASSISTANT

## 2024-06-10 PROCEDURE — 36415 COLL VENOUS BLD VENIPUNCTURE: CPT | Performed by: PHYSICIAN ASSISTANT

## 2024-06-11 ENCOUNTER — TRANSITIONAL CARE UNIT VISIT (OUTPATIENT)
Dept: GERIATRICS | Facility: CLINIC | Age: 89
End: 2024-06-11
Payer: COMMERCIAL

## 2024-06-11 ENCOUNTER — LAB REQUISITION (OUTPATIENT)
Dept: LAB | Facility: CLINIC | Age: 89
End: 2024-06-11

## 2024-06-11 VITALS
DIASTOLIC BLOOD PRESSURE: 73 MMHG | TEMPERATURE: 98.2 F | HEART RATE: 95 BPM | OXYGEN SATURATION: 95 % | RESPIRATION RATE: 18 BRPM | SYSTOLIC BLOOD PRESSURE: 131 MMHG

## 2024-06-11 DIAGNOSIS — Z78.9 IMPAIRED MOBILITY AND ACTIVITIES OF DAILY LIVING: ICD-10-CM

## 2024-06-11 DIAGNOSIS — G47.51 CONFUSIONAL AROUSALS: ICD-10-CM

## 2024-06-11 DIAGNOSIS — S22.080D COMPRESSION FRACTURE OF T12 VERTEBRA WITH ROUTINE HEALING, SUBSEQUENT ENCOUNTER: ICD-10-CM

## 2024-06-11 DIAGNOSIS — R53.81 PHYSICAL DECONDITIONING: ICD-10-CM

## 2024-06-11 DIAGNOSIS — Z74.09 IMPAIRED MOBILITY AND ACTIVITIES OF DAILY LIVING: ICD-10-CM

## 2024-06-11 DIAGNOSIS — K59.00 CONSTIPATION, UNSPECIFIED CONSTIPATION TYPE: ICD-10-CM

## 2024-06-11 DIAGNOSIS — R41.0 CONFUSION: ICD-10-CM

## 2024-06-11 DIAGNOSIS — F32.A DEPRESSION, UNSPECIFIED DEPRESSION TYPE: Primary | ICD-10-CM

## 2024-06-11 DIAGNOSIS — J81.0 ACUTE PULMONARY EDEMA (H): ICD-10-CM

## 2024-06-11 DIAGNOSIS — N39.0 URINARY TRACT INFECTION, SITE NOT SPECIFIED: ICD-10-CM

## 2024-06-11 DIAGNOSIS — I50.9 ACUTE ON CHRONIC CONGESTIVE HEART FAILURE, UNSPECIFIED HEART FAILURE TYPE (H): ICD-10-CM

## 2024-06-11 LAB
ALBUMIN UR-MCNC: NEGATIVE MG/DL
AMORPH CRY #/AREA URNS HPF: ABNORMAL /HPF
APPEARANCE UR: CLEAR
BILIRUB UR QL STRIP: NEGATIVE
COLOR UR AUTO: ABNORMAL
GAMMA INTERFERON BACKGROUND BLD IA-ACNC: 0.05 IU/ML
GLUCOSE UR STRIP-MCNC: NEGATIVE MG/DL
HGB UR QL STRIP: NEGATIVE
KETONES UR STRIP-MCNC: NEGATIVE MG/DL
LEUKOCYTE ESTERASE UR QL STRIP: NEGATIVE
M TB IFN-G BLD-IMP: NEGATIVE
M TB IFN-G CD4+ BCKGRND COR BLD-ACNC: 9.95 IU/ML
MITOGEN IGNF BCKGRD COR BLD-ACNC: 0.01 IU/ML
MITOGEN IGNF BCKGRD COR BLD-ACNC: 0.02 IU/ML
NITRATE UR QL: NEGATIVE
PH UR STRIP: 7 [PH] (ref 5–7)
QUANTIFERON MITOGEN: 10 IU/ML
QUANTIFERON NIL TUBE: 0.05 IU/ML
QUANTIFERON TB1 TUBE: 0.06 IU/ML
QUANTIFERON TB2 TUBE: 0.07
RBC URINE: <1 /HPF
SP GR UR STRIP: 1.01 (ref 1–1.03)
UROBILINOGEN UR STRIP-MCNC: NORMAL MG/DL
WBC URINE: 0 /HPF

## 2024-06-11 PROCEDURE — 81001 URINALYSIS AUTO W/SCOPE: CPT | Performed by: PHYSICIAN ASSISTANT

## 2024-06-11 PROCEDURE — 99310 SBSQ NF CARE HIGH MDM 45: CPT | Performed by: PHYSICIAN ASSISTANT

## 2024-06-11 NOTE — PROGRESS NOTES
[facility] GERIATRICS    PRIMARY CARE PROVIDER AND CLINIC:  Ervin Ledesma PA-C, [address]  / [place] [address]  Chief Complaint   Patient presents with    Hospital F/U      [place] Medical Record Number:  [id number]  Place of Service where encounter took place:  [facility] ([facility]) [[id number]]      HPI:    89yo female with PMHx HTN, anxiety who was admitted at [facility] from 6/5 - 6/8, 2024 after presenting with dizziness, hypertension, constipation. Also reported fall 2d PTA, imaging at O neg and episode of chest tightness prior to ED presentation. Evaluation in ED included basic labs, EKG which were within baseline for pt. D-dimer elevated, BNP 1485. UA was abnormal. CT A/P with contrast neg for PE, noted age-indeterminate fracture of T12, findings c/w pulmonary edema and moderate amount of stool in rectum. Started on lasix every other day. UCx was neg, abx discontinued. TTE deferred given age, GOC. Started on bowel regimen for constipation, pain meds for back. Referred to TCU for rehab, med management.    Pt is seen as initial visit. History obtained via family who assists with interpreting, pt is Farsi speaking. Family reports she has been confused since arrival at TCU. They state she was very confused initially, unaware of surroundings or situation. Has since improved, now is able to recognize location but continues to wander with stories, difficult to follow. They are questioning if the UTI persists or if there is an alternative metabolic explanation. Pt herself reports she is eating, does not have an appetite. Abdomen is distended and slightly uncomfortable. States had a small bowel movement yesterday. Denies urinary symptoms. No cp, sob. Prior to hospitalization lives with daughter in apartment, is very independent. Full code.     CODE STATUS/ADVANCE DIRECTIVES DISCUSSION:  Full Code  CPR/Full code   ALLERGIES: No Known Allergies   PAST MEDICAL HISTORY:   Past  Medical History:   Diagnosis Date    Hypertension       PAST SURGICAL HISTORY:   has a past surgical history that includes Release carpal tunnel (Right, 6/13/2016) and Cholecystectomy.  FAMILY HISTORY: family history includes Unknown/Adopted in her father and mother.  SOCIAL HISTORY:   reports that she has never smoked. She has never used smokeless tobacco. She reports that she does not drink alcohol and does not use drugs.  Patient's living condition: lives alone    Post Discharge Medication Reconciliation Status:   MED REC REQUIRED  Post Medication Reconciliation Status: patient was not discharged from an inpatient facility or TCU       Current Outpatient Medications   Medication Sig Dispense Refill    acetaminophen (TYLENOL) 325 MG tablet Take 325-650 mg by mouth every 4 hours as needed for mild pain Max acetaminophen dose: 4000 mg in 24 hrs      ALPRAZolam (XANAX) 0.25 MG tablet Take 1 tablet (0.25 mg) by mouth 3 times daily as needed for anxiety 10 tablet 0    amLODIPine (NORVASC) 5 MG tablet Take 1 tablet (5 mg) by mouth daily 60 tablet 2    Ascorbic Acid (VITAMIN C/BIOFLAVONOIDS/ROSEHP PO) Take 1 tablet by mouth daily       aspirin (ASPIRIN LOW DOSE) 81 MG EC tablet TAKE 1 TABLET BY MOUTH EVERY DAY WITH FOOD 90 tablet 1    azelastine (OPTIVAR) 0.05 % SOLN ophthalmic solution Place 1 drop into both eyes 2 times daily      bisacodyl (DULCOLAX) 10 MG suppository Place 1 suppository (10 mg) rectally daily as needed for constipation      calcium carbonate 750 MG CHEW Take 750 mg by mouth as needed      calcium carbonate-vitamin D (OSCAL) 500-5 MG-MCG tablet Take 1 tablet by mouth 2 times daily (with meals) 180 tablet 1    cholecalciferol (VITAMIN D3) 1000 UNIT tablet Take 1,000 Units by mouth daily       cyanocobalamin (VITAMIN B-12) 1000 MCG tablet Take 1,000 mcg by mouth daily      cyclobenzaprine (FLEXERIL) 10 MG tablet Take 1 tablet (10 mg) by mouth 3 times daily 30 tablet 0    diclofenac (VOLTAREN) 1 %  topical gel Apply 4 g topically 2 times daily 200 g 0    furosemide (LASIX) 20 MG tablet Take 1 tablet (20 mg) by mouth every 48 hours      gabapentin (NEURONTIN) 100 MG capsule Take 1 capsule (100 mg) by mouth nightly as needed for neuropathic pain 60 capsule 1    guaiFENesin (MUCINEX) 600 MG 12 hr tablet Take 1,200 mg by mouth 2 times daily as needed for congestion (Patient not taking: Reported on 6/5/2024)      Lidocaine (LIDOCARE) 4 % Patch Place 1 patch onto the skin every 24 hours To prevent lidocaine toxicity, patient should be patch free for 12 hrs daily. 15 patch 0    Multiple Vitamin (DAILY-NETTA MULTIVITAMIN) TABS Take 1 tablet by mouth daily 90 tablet 2    Multiple Vitamins-Minerals (CENTRUM FRESH/FRUITY 50+ PO) Take 1 tablet by mouth daily      olopatadine (PATADAY) 0.2 % ophthalmic solution Place 1 drop into both eyes as needed      Omega-3 Fatty Acids (OMEGA-3 FISH OIL) 1200 MG CAPS Take 1,200 mg by mouth daily       omeprazole (PRILOSEC) 20 MG DR capsule TAKE 1 CAPSULE BY MOUTH EVERY DAY BEFORE A MEAL 90 capsule 1    order for DME WALKER; with wheels, and brakes, and a seat. 1 Device 0    order for DME Equipment being ordered:  Compression stockings,knee high,mild compression 3 Units 3    order for DME Knee high compression stockings.  Light compression.  16-20mm Hg.  Ok 3 pairs.      OVER-THE-COUNTER 550 mg 3 times daily as needed (Takes 3x daily when senokot isn't available or if senokot doesn't work for her.)      polyethylene glycol (MIRALAX) 17 GM/Dose powder Take 17 g by mouth 2 times daily      polyethylene glycol (MIRALAX) 17 GM/Dose powder Take 17 g by mouth daily as needed for constipation 510 g 0    senna-docusate (SENOKOT-S/PERICOLACE) 8.6-50 MG tablet Take 1 tablet by mouth 2 times daily as needed for constipation 60 tablet 1    sennosides (SENOKOT) 8.6 MG tablet TAKE 3 TABLETS BY MOUTH EVERY  tablet 1    sertraline (ZOLOFT) 50 MG tablet Take 1 tablet (50 mg) by mouth daily 90  tablet 1    UNKNOWN TO PATIENT Patient's daughter says she uses an inhaler with a capsule, but could not supply any more information.       No current facility-administered medications for this visit.       ROS:  4 point ROS including Respiratory, CV, GI and , other than that noted in the HPI,  is negative    Vitals:  /73   Pulse 95   Temp 98.2  F (36.8  C)   Resp 18   SpO2 95%   Exam:  GEN: well-developed, well-nourished, appears comfortable  HEENT: NCAT, EOM intact bilaterally, sclera clear, conjunctiva normal, nose & mouth patent, mucous membranes moist  CHEST: lungs CTA bilaterally, no increased work of breathing, no wheeze, crackles, rhonchi  HEART: RRR, S1 & S2  ABD: soft, distended, slightly tender to palpation in epigastric region, bilateral upper quadrants  MSK: AROM bilateral UE/LE  NEURO: awake, alert. CN II-XII grossly intact. Sensation grossly intact to light touch.   SKIN: warm & dry without rash, no pedal edema    Lab/Diagnostic data:  Recent labs in [facility] reviewed by me today.  and Most Recent 3 CBC's:  Recent Labs   Lab Test 06/06/24  0718 06/05/24  1039 03/13/24  1100   WBC 10.9 10.8 5.3   HGB 13.0 12.9 13.1   MCV 85 85 87    162 194     Most Recent 3 BMP's:  Recent Labs   Lab Test 06/08/24  0642 06/06/24  1925 06/06/24  0718 06/05/24  1039 03/13/24  1100   NA  --   --  132* 133* 141   POTASSIUM 4.2 4.0 3.3* 3.7 4.6   CHLORIDE  --   --  94* 95* 103   CO2  --   --  27 26 30*   BUN  --   --  14.3 11.3 15.2   CR  --   --  0.47* 0.48* 0.70   ANIONGAP  --   --  11 12 8   JINA  --   --  8.7 9.2 9.6   GLC  --   --  145* 185* 96     7-Day Micro Results       Collected Updated Procedure Result Status      06/10/2024 0741 06/11/2024 1433 Quantiferon TB Gold Plus Grey Tube [[id number]]   Peripheral Blood    Final result Component Value Units   Quantiferon Nil Tube 0.05 IU/mL            06/10/2024 0741 06/11/2024 1432 Quantiferon TB Gold Plus Green Tube [[id number]]   Peripheral Blood     Final result Component Value Units   Quantiferon TB1 Tube 0.06 IU/mL            06/10/2024 0741 06/11/2024 1432 Quantiferon TB Gold Plus Yellow Tube [[id number]]   Peripheral Blood    Final result Component Value   Quantiferon TB2 Tube 0.07            06/10/2024 0741 06/11/2024 1431 Quantiferon TB Gold Plus Purple Tube [[id number]]   Peripheral Blood    Final result Component Value Units   Quantiferon Mitogen 10.00 IU/mL            06/10/2024 0741 06/11/2024 1609 Quantiferon TB Gold Plus [[id number]]   Peripheral Blood    Final result Component Value Units   Quantiferon-TB Gold Plus Negative    No interferon gamma response to M.tuberculosis antigens was detected. Infection with M.tuberculosis is unlikely, however a single negative result does not exclude infection. In patients at high risk for infection, a second test should be considered in accordance with the 2017 ATS/IDSA/CDC Clinical Pract  ice Guidelines for Diagnosis of Tuberculosis in Adults and Children    TB1 Ag minus Nil Value 0.01 IU/mL   TB2 Ag minus Nil Value 0.02 IU/mL   Mitogen minus Nil Result 9.95 IU/mL   Nil Result 0.05 IU/mL            06/05/2024 1255 06/06/2024 0603 Urine Culture [[id number]]    Urine, Midstream    Final result Component Value   Culture >100,000 CFU/mL Mixture of Urogenital Ping                       ASSESSMENT/PLAN:    HFpEF, acute on chronic  Pulmonary edema  Hypertension  As above, presented with episode of chest discomfort, sob. BNP 1485, CTA neg for PE, noted pleural effusions and pulmonary edema. Started on lasix 20mg every other day. TTE not pursued to align with GOC. Amlodipine increased to 5mg/d due to elevated bps.  -Continues on lasix 20mg every other day, amlodipine 5mg/d  -Daily weights  -Monitor BP trend    Confusion  Family report confusion since TCU admission, has been slowly improving. She is sleeping well. Routine is different from home. Pt had an abnormal UA while inpatient with negative Cx, family  requesting repeat evaluation to rule-out occult infection.  -UA/UC  -BMP, CBC in AM  -OT for cog eval    Constipation, severe  Initial reason for ED evaluation. Started on bowel regimen in hospital with slight improvement. Pt continues to report distention, had a small BM yesterday.  -Miralax daily, Senna 2 tab BID, dulcolax suppository daily PRN    T12 compression fracture  Physical deconditioning  Impaired mobility and ADLs  Pain controlled.  -Continue tylenol, lidoderm, flexeril  -PT/OT evaluations  -SW for discharge planning    Depression  Anxiety  Chronic, stable.  -Continues on zoloft, ativan    Total time spent with patient visit at the skilled nursing facility was 45 min including patient visit and review of past records.     Electronically signed by:  Nahun Cronin PA-C

## 2024-06-12 LAB
ERYTHROCYTE [DISTWIDTH] IN BLOOD BY AUTOMATED COUNT: 11.5 % (ref 10–15)
HCT VFR BLD AUTO: 37.8 % (ref 35–47)
HGB BLD-MCNC: 11.9 G/DL (ref 11.7–15.7)
MCH RBC QN AUTO: 27.5 PG (ref 26.5–33)
MCHC RBC AUTO-ENTMCNC: 31.5 G/DL (ref 31.5–36.5)
MCV RBC AUTO: 88 FL (ref 78–100)
PLATELET # BLD AUTO: 277 10E3/UL (ref 150–450)
RBC # BLD AUTO: 4.32 10E6/UL (ref 3.8–5.2)
WBC # BLD AUTO: 6.4 10E3/UL (ref 4–11)

## 2024-06-12 PROCEDURE — 36415 COLL VENOUS BLD VENIPUNCTURE: CPT | Performed by: PHYSICIAN ASSISTANT

## 2024-06-12 PROCEDURE — P9604 ONE-WAY ALLOW PRORATED TRIP: HCPCS | Performed by: PHYSICIAN ASSISTANT

## 2024-06-12 PROCEDURE — 85048 AUTOMATED LEUKOCYTE COUNT: CPT | Performed by: PHYSICIAN ASSISTANT

## 2024-06-14 ENCOUNTER — TRANSITIONAL CARE UNIT VISIT (OUTPATIENT)
Dept: GERIATRICS | Facility: CLINIC | Age: 89
End: 2024-06-14
Payer: COMMERCIAL

## 2024-06-14 VITALS
OXYGEN SATURATION: 92 % | HEIGHT: 60 IN | RESPIRATION RATE: 18 BRPM | HEART RATE: 99 BPM | BODY MASS INDEX: 25.87 KG/M2 | TEMPERATURE: 98.2 F | SYSTOLIC BLOOD PRESSURE: 97 MMHG | DIASTOLIC BLOOD PRESSURE: 60 MMHG | WEIGHT: 131.8 LBS

## 2024-06-14 DIAGNOSIS — R53.81 PHYSICAL DECONDITIONING: ICD-10-CM

## 2024-06-14 DIAGNOSIS — R41.0 CONFUSION: ICD-10-CM

## 2024-06-14 DIAGNOSIS — Z74.09 IMPAIRED MOBILITY AND ACTIVITIES OF DAILY LIVING: ICD-10-CM

## 2024-06-14 DIAGNOSIS — I50.9 ACUTE ON CHRONIC CONGESTIVE HEART FAILURE, UNSPECIFIED HEART FAILURE TYPE (H): ICD-10-CM

## 2024-06-14 DIAGNOSIS — S22.080D COMPRESSION FRACTURE OF T12 VERTEBRA WITH ROUTINE HEALING, SUBSEQUENT ENCOUNTER: ICD-10-CM

## 2024-06-14 DIAGNOSIS — Z78.9 IMPAIRED MOBILITY AND ACTIVITIES OF DAILY LIVING: ICD-10-CM

## 2024-06-14 DIAGNOSIS — F32.A DEPRESSION, UNSPECIFIED DEPRESSION TYPE: ICD-10-CM

## 2024-06-14 DIAGNOSIS — J81.0 ACUTE PULMONARY EDEMA (H): Primary | ICD-10-CM

## 2024-06-14 DIAGNOSIS — K59.00 CONSTIPATION, UNSPECIFIED CONSTIPATION TYPE: ICD-10-CM

## 2024-06-14 PROCEDURE — 99309 SBSQ NF CARE MODERATE MDM 30: CPT | Performed by: PHYSICIAN ASSISTANT

## 2024-06-14 NOTE — PROGRESS NOTES
CC attended care conference for member on 06/14/2024 at [facility].   Present at care conference Hugo, OT; Kim AYOUB, interim SW, JOSE, dghtr Yoselin, interim nursing  OT Report: Needing A-1 with dressing and toileting. OT would like to work on toileting more. Mod-max assist with dressing and toileting. Mod-assist with bathing. Fx in spine to not twist spine too much. It will heal on its own.    Cognitive testing results: NA difficult with language barrier.  PT Report: A-1 w/ walking. Goal is 150 ft. Back pain is a limitation. Transfer contact guard/min-assist.  Nursing Report: Pain is tolerable using a patch. Reviewed medications. Code Status: full code.    Social Work Report: Uses [facility]  TCU Recommendations: PT/OT with home care. Discharge tentative for 6/24/24.  CC Report:  CC Notified TCU team of 30 day waiver closure requirement, waiver will close on July 5th. Reviewed that if member discharges after the 30th day, will need to be re-screened for elderly waiver prior to discharge.     Flora Luis, [facility]  [phone]  Fax: [phone]

## 2024-06-14 NOTE — PROGRESS NOTES
[facility] GERIATRICS    Chief Complaint   Patient presents with    RECHECK     HPI:  Aracelis Blakely is a 89 year old  (3/21/1922), who is being seen today for an episodic care visit at: [facility] ([facility]) [[id number]].     Summary: 89yo female with PMHx HTN, anxiety who was admitted at [facility] from 6/5 - 6/8, 2024 after presenting with dizziness, hypertension, constipation. Also reported fall 2d PTA, imaging at O neg and episode of chest tightness prior to ED presentation. Evaluation in ED included basic labs, EKG which were within baseline for pt. D-dimer elevated, BNP 1485. UA was abnormal. CT A/P with contrast neg for PE, noted age-indeterminate fracture of T12, findings c/w pulmonary edema and moderate amount of stool in rectum. Started on lasix every other day. UCx was neg, abx discontinued. TTE deferred given age, GOC. Started on bowel regimen for constipation, pain meds for back. Referred to TCU for rehab, med management.     Today, pt is seen in follow-up. Daughter at bedside, assists with translating. States patient is doing much better. Pain in back has improved, remains present intermittently but appears better. Mentation is also improved, she reports pt is back at baseline. Abdominal distention has decreased, she is having bowel movements. Appetite is picking up. Facility staff are without concerns.    On review of facility records, BPs ranging 110-147, HRs , weight 131.8#.    Allergies, and PMH/PSH reviewed in EPIC today.  REVIEW OF SYSTEMS:  4 point ROS including Respiratory, CV, GI and , other than that noted in the HPI,  is negative    Objective:   BP 97/60   Pulse 99   Temp 98.2  F (36.8  C)   Resp 18   Ht 1.524 m (5')   Wt 59.8 kg (131 lb 12.8 oz)   SpO2 92%   BMI 25.74 kg/m    GEN: well-developed, well-nourished, appears comfortable  HEENT: NCAT, EOM intact bilaterally, sclera clear, conjunctiva normal, nose & mouth patent, mucous membranes  moist  CHEST: lungs CTA bilaterally, no increased work of breathing, no wheeze, crackles, rhonchi  HEART: RRR, S1 & S2  ABD: soft, improved distention, nontender, +BS in all 4 quadrants  MSK: AROM bilateral UE/LE  NEURO: awake, alert. CN II-XII grossly intact. Sensation grossly intact to light touch.   SKIN: warm & dry without rash, no pedal edema    Recent labs in [facility] reviewed by me today.  and Most Recent 3 CBC's:  Recent Labs   Lab Test 06/12/24  0618 06/06/24  0718 06/05/24  1039   WBC 6.4 10.9 10.8   HGB 11.9 13.0 12.9   MCV 88 85 85    185 162     Most Recent 3 BMP's:  Recent Labs   Lab Test 06/08/24  0642 06/06/24  1925 06/06/24  0718 06/05/24  1039 03/13/24  1100   NA  --   --  132* 133* 141   POTASSIUM 4.2 4.0 3.3* 3.7 4.6   CHLORIDE  --   --  94* 95* 103   CO2  --   --  27 26 30*   BUN  --   --  14.3 11.3 15.2   CR  --   --  0.47* 0.48* 0.70   ANIONGAP  --   --  11 12 8   JINA  --   --  8.7 9.2 9.6   GLC  --   --  145* 185* 96       Assessment/Plan:    HFpEF, acute on chronic  Pulmonary edema  Hypertension  As above, presented with episode of chest discomfort, sob. BNP 1485, CTA neg for PE, noted pleural effusions and pulmonary edema. Started on lasix 20mg every other day. TTE not pursued to align with GOC. Amlodipine increased to 5mg/d due to elevated bps.  -Continues on lasix 20mg every other day, amlodipine 5mg/d  -Daily weights  -Monitor BP trend     Confusion, improved  Family report confusion since TCU admission, has since improved and pt appears at baseline. She is sleeping well. Routine is different from home. Likely mild hypodelirium. UA/UC neg. Basic labs unremarkable.  -Supportive management  -Maintain sleep/wake cycle  -OT for cog eval pending     Constipation, severe, improving  Initial reason for ED evaluation. Started on bowel regimen in hospital with slight improvement. On TCU admission with ongoing distention, discomfort. Improving on bowel regimen.  -Miralax daily, Senna 2 tab BID,  dulcolax suppository daily PRN     T12 compression fracture  Physical deconditioning  Impaired mobility and ADLs  Pain controlled.  -Continue tylenol, lidoderm, flexeril  -PT/OT continuing  - for discharge planning     Depression  Anxiety  Chronic, stable.  -Continues on zoloft, ativan    MED REC REQUIRED  Post Medication Reconciliation Status: patient was not discharged from an inpatient facility or TCU      Electronically signed by: Nahun Cronin PA-C

## 2024-06-18 ENCOUNTER — TRANSITIONAL CARE UNIT VISIT (OUTPATIENT)
Dept: GERIATRICS | Facility: CLINIC | Age: 89
End: 2024-06-18
Payer: COMMERCIAL

## 2024-06-18 VITALS
TEMPERATURE: 98.2 F | SYSTOLIC BLOOD PRESSURE: 117 MMHG | RESPIRATION RATE: 18 BRPM | WEIGHT: 129.2 LBS | DIASTOLIC BLOOD PRESSURE: 62 MMHG | HEART RATE: 99 BPM | BODY MASS INDEX: 25.36 KG/M2 | HEIGHT: 60 IN | OXYGEN SATURATION: 94 %

## 2024-06-18 DIAGNOSIS — I50.9 ACUTE ON CHRONIC CONGESTIVE HEART FAILURE, UNSPECIFIED HEART FAILURE TYPE (H): Primary | ICD-10-CM

## 2024-06-18 DIAGNOSIS — R53.81 PHYSICAL DECONDITIONING: ICD-10-CM

## 2024-06-18 DIAGNOSIS — Z78.9 IMPAIRED MOBILITY AND ACTIVITIES OF DAILY LIVING: ICD-10-CM

## 2024-06-18 DIAGNOSIS — R41.0 CONFUSION: ICD-10-CM

## 2024-06-18 DIAGNOSIS — K59.00 CONSTIPATION, UNSPECIFIED CONSTIPATION TYPE: ICD-10-CM

## 2024-06-18 DIAGNOSIS — S22.080D COMPRESSION FRACTURE OF T12 VERTEBRA WITH ROUTINE HEALING, SUBSEQUENT ENCOUNTER: ICD-10-CM

## 2024-06-18 DIAGNOSIS — F32.A DEPRESSION, UNSPECIFIED DEPRESSION TYPE: ICD-10-CM

## 2024-06-18 DIAGNOSIS — J81.0 ACUTE PULMONARY EDEMA (H): ICD-10-CM

## 2024-06-18 DIAGNOSIS — Z74.09 IMPAIRED MOBILITY AND ACTIVITIES OF DAILY LIVING: ICD-10-CM

## 2024-06-18 PROCEDURE — 99308 SBSQ NF CARE LOW MDM 20: CPT | Performed by: PHYSICIAN ASSISTANT

## 2024-06-18 NOTE — PROGRESS NOTES
[facility] GERIATRICS    Chief Complaint   Patient presents with    RECHECK     HPI:  Aracelis Blakely is a 89 year old  (3/21/1922), who is being seen today for an episodic care visit at: [facility] ([facility]) [[id number]].     Summary: 89yo female with PMHx HTN, anxiety who was admitted at [facility] from 6/5 - 6/8, 2024 after presenting with dizziness, hypertension, constipation. Also reported fall 2d PTA, imaging at O neg and episode of chest tightness prior to ED presentation. Evaluation in ED included basic labs, EKG which were within baseline for pt. D-dimer elevated, BNP 1485. UA was abnormal. CT A/P with contrast neg for PE, noted age-indeterminate fracture of T12, findings c/w pulmonary edema and moderate amount of stool in rectum. Started on lasix every other day. UCx was neg, abx discontinued. TTE deferred given age, GOC. Started on bowel regimen for constipation, pain meds for back. Referred to TCU for rehab, med management.     Today, pt is seen in follow-up. Family at bedside, assist with translating. Pt is doing well, progressing in therapies. Pain is controlled. Denies sob, cp. Constipation has also improved, abdominal discomfort is much less. Pt remains at baseline mentation. No concerns from facility staff.    On review of facility records, BPs ranging 103-119, HRs , weight 129.2.    Allergies, and PMH/PSH reviewed in EPIC today.  REVIEW OF SYSTEMS:  4 point ROS including Respiratory, CV, GI and , other than that noted in the HPI,  is negative    Objective:   /62   Pulse 99   Temp 98.2  F (36.8  C)   Resp 18   Ht 1.524 m (5')   Wt 58.6 kg (129 lb 3.2 oz)   SpO2 94%   BMI 25.23 kg/m    GEN: well-developed, well-nourished, appears comfortable  HEENT: NCAT, EOM intact bilaterally, sclera clear, conjunctiva normal, nose & mouth patent, mucous membranes moist  CHEST: lungs CTA bilaterally, no increased work of breathing, no wheeze, crackles,  rhonchi  HEART: RRR, S1 & S2  ABD: soft, nondistended, nontender, +BS in all 4 quadrants  MSK: AROM bilateral UE/LE  NEURO: awake, alert. CN II-XII grossly intact. Sensation grossly intact to light touch.   SKIN: warm & dry without rash, no pedal edema    Recent labs in [facility] reviewed by me today.  and Most Recent 3 CBC's:  Recent Labs   Lab Test 06/12/24  0618 06/06/24  0718 06/05/24  1039   WBC 6.4 10.9 10.8   HGB 11.9 13.0 12.9   MCV 88 85 85    185 162     Most Recent 3 BMP's:  Recent Labs   Lab Test 06/08/24  0642 06/06/24  1925 06/06/24  0718 06/05/24  1039 03/13/24  1100   NA  --   --  132* 133* 141   POTASSIUM 4.2 4.0 3.3* 3.7 4.6   CHLORIDE  --   --  94* 95* 103   CO2  --   --  27 26 30*   BUN  --   --  14.3 11.3 15.2   CR  --   --  0.47* 0.48* 0.70   ANIONGAP  --   --  11 12 8   JINA  --   --  8.7 9.2 9.6   GLC  --   --  145* 185* 96       Assessment/Plan:    HFpEF, acute on chronic  Pulmonary edema  Hypertension  As above, presented with episode of chest discomfort, sob. BNP 1485, CTA neg for PE, noted pleural effusions and pulmonary edema. Started on lasix 20mg every other day. TTE not pursued to align with GOC. Amlodipine increased to 5mg/d due to elevated bps. BPs controlled.  -Continues on lasix 20mg every other day, amlodipine 5mg/d  -Daily weights  -Monitor BP trend     Confusion, improved  Family report confusion since TCU admission, has since improved and pt appears at baseline. She is sleeping well. Routine is different from home. Likely mild hypodelirium. UA/UC neg. Basic labs unremarkable.  -Supportive management  -Maintain sleep/wake cycle  -OT for cog eval pending     Constipation, severe, improving  Initial reason for ED evaluation. Started on bowel regimen in hospital with slight improvement. On TCU admission with ongoing distention, discomfort. Improving on bowel regimen.  -Miralax daily, Senna 2 tab BID, dulcolax suppository daily PRN     T12 compression fracture  Physical  deconditioning  Impaired mobility and ADLs  Pain controlled.  -Continue tylenol, lidoderm, flexeril  -PT/OT continuing  - for discharge planning     Depression  Anxiety  Chronic, stable.  -Continues on zoloft, ativan    MED REC REQUIRED  Post Medication Reconciliation Status: patient was not discharged from an inpatient facility or TCU      Electronically signed by: Nahun Cronin PA-C

## 2024-06-20 NOTE — PROGRESS NOTES
[facility]  INITIAL VISIT NOTE  June 21, 2024    PRIMARY CARE PROVIDER AND CLINIC:  Ervin Ledesma [address] [facility] [address]  / [place] [address]    CHIEF COMPLAINT:  Hospital follow-up/Initial visit    HPI:    Aracelis Blakely is a 89 year old  (3/21/1922) female who was [facility], [facility] for an initial visit.     Medical history is notable for hypertension, GERD, H. pylori infection, depression, anxiety, osteoporosis, compression fracture, and osteoarthritis.    Summary of hospital course:  Patient was hospitalized at [facility] from June 5 through June 8, 2024 for acute pulmonary edema.  Patient originally presented with dizziness, constipation, and hypertension.  Initial laboratory evaluation was significant for sodium 133, potassium 3.7, creatinine 0.48, N-terminal proBNP 1485, troponin I 16, white count 10.8, and hemoglobin 12.9.  EKG was remarkable for sinus tachycardia with a heart rate of 104 bpm.  CT chest/abdomen/pelvis demonstrated trace bilateral pleural effusions, pulmonary edema, ectasia of the ascending thoracic aorta, measuring 3.7 cm, and age-indeterminate T12 fracture.  Patient was diuresed with IV furosemide and PTA amlodipine dose was increased.  Echocardiogram was not obtained in the hospital as per discussion with daughter as family did not wish any aggressive evaluation.  Notably, previous LVEF was 68% in May 2016.  She was treated with IV ceftriaxone for abnormal UA followed by oral cefdinir, however urine culture grew mixed urogenital edwina.  TCU was recommended for rehab.    Patient is admitted to this facility for medical management, nursing care, and subacute rehab.     Of note, history was obtained from patient, facility staff, and extensive review of the chart including hospital admission note, consult notes, and discharge summary.    Today's visit:  Patient was seen in her room, lying in bed.  She appears  somewhat frail but comfortable.  She does complain of lack of bowel movement for several days.  She denies fever, chills, chest pain, palpitation, dyspnea, nausea, vomiting, abdominal pain, or urinary symptoms.  Her back pain has improved.      CODE STATUS:   CPR/Full code     PAST MEDICAL HISTORY:   Pulmonary edema in June 2024 due to suspected HFpEF (LVEF 55-60%, per echo in May 2010 and LVEF 68%, per nuclear stress test in May 2016)  Hypertension  Ectasia of the ascending thoracic aorta, measuring 3.7 cm (per CT on June 5, 2024)  GERD  H. pylori infection  Depression  Anxiety  Osteoporosis  L4 and T12 compression fractures  Osteoarthritis    Past Medical History:   Diagnosis Date    Hypertension        PAST SURGICAL HISTORY:   Past Surgical History:   Procedure Laterality Date    CHOLECYSTECTOMY      RELEASE CARPAL TUNNEL Right 6/13/2016       FAMILY HISTORY:   Family History   Problem Relation Age of Onset    Unknown/Adopted Mother     Unknown/Adopted Father        SOCIAL HISTORY:  Social History     Tobacco Use    Smoking status: Never    Smokeless tobacco: Never   Substance Use Topics    Alcohol use: No       MEDICATIONS:  Current Outpatient Medications   Medication Sig Dispense Refill    acetaminophen (TYLENOL) 325 MG tablet Take 650 mg by mouth every 4 hours as needed for mild pain Max acetaminophen dose: 4000 mg in 24 hrs      ALPRAZolam (XANAX) 0.25 MG tablet Take 1 tablet (0.25 mg) by mouth 3 times daily as needed for anxiety 10 tablet 0    amLODIPine (NORVASC) 5 MG tablet Take 1 tablet (5 mg) by mouth daily 60 tablet 2    Ascorbic Acid (VITAMIN C/BIOFLAVONOIDS/ROSEHP PO) Take 1 tablet by mouth daily       aspirin (ASPIRIN LOW DOSE) 81 MG EC tablet TAKE 1 TABLET BY MOUTH EVERY DAY WITH FOOD 90 tablet 1    azelastine (OPTIVAR) 0.05 % SOLN ophthalmic solution Place 1 drop into both eyes 2 times daily      bisacodyl (DULCOLAX) 10 MG suppository Place 1 suppository (10 mg) rectally daily as needed for  constipation      calcium carbonate 750 MG CHEW Take 750 mg by mouth as needed      calcium carbonate-vitamin D (OSCAL) 500-5 MG-MCG tablet Take 1 tablet by mouth 2 times daily (with meals) 180 tablet 1    cholecalciferol (VITAMIN D3) 1000 UNIT tablet Take 1,000 Units by mouth daily       cyanocobalamin (VITAMIN B-12) 1000 MCG tablet Take 1,000 mcg by mouth daily      cyclobenzaprine (FLEXERIL) 10 MG tablet Take 1 tablet (10 mg) by mouth 3 times daily 30 tablet 0    diclofenac (VOLTAREN) 1 % topical gel Apply 4 g topically 2 times daily 200 g 0    furosemide (LASIX) 20 MG tablet Take 1 tablet (20 mg) by mouth every 48 hours      gabapentin (NEURONTIN) 100 MG capsule Take 1 capsule (100 mg) by mouth nightly as needed for neuropathic pain 60 capsule 1    Lidocaine (LIDOCARE) 4 % Patch Place 1 patch onto the skin every 24 hours To prevent lidocaine toxicity, patient should be patch free for 12 hrs daily. 15 patch 0    Multiple Vitamin (DAILY-NETTA MULTIVITAMIN) TABS Take 1 tablet by mouth daily 90 tablet 2    Multiple Vitamins-Minerals (CENTRUM FRESH/FRUITY 50+ PO) Take 1 tablet by mouth daily      olopatadine (PATADAY) 0.2 % ophthalmic solution Place 1 drop into both eyes as needed      Omega-3 Fatty Acids (OMEGA-3 FISH OIL) 1200 MG CAPS Take 1,200 mg by mouth daily       omeprazole (PRILOSEC) 20 MG DR capsule TAKE 1 CAPSULE BY MOUTH EVERY DAY BEFORE A MEAL 90 capsule 1    order for DME WALKER; with wheels, and brakes, and a seat. (Patient not taking: Reported on 6/13/2024) 1 Device 0    order for DME Equipment being ordered:  Compression stockings,knee high,mild compression (Patient not taking: Reported on 6/13/2024) 3 Units 3    order for DME Knee high compression stockings.  Light compression.  16-20mm Hg.  Ok 3 pairs. (Patient not taking: Reported on 6/13/2024)      polyethylene glycol (MIRALAX) 17 GM/Dose powder Take 17 g by mouth 2 times daily (Patient taking differently: Take 17 g by mouth daily)       senna-docusate (SENOKOT-S/PERICOLACE) 8.6-50 MG tablet Take 1 tablet by mouth 2 times daily as needed for constipation (Patient taking differently: Take 2 tablets by mouth 2 times daily) 60 tablet 1    sertraline (ZOLOFT) 50 MG tablet Take 1 tablet (50 mg) by mouth daily 90 tablet 1       MED REC REQUIRED  Post Medication Reconciliation Status: medication reconcilation previously completed during another office visit         ALLERGIES:  No Known Allergies    ROS:  10 point ROS were negative other than the symptoms noted above in the HPI.    PHYSICAL EXAM:  Vital signs were reviewed in the chart.  Vital Signs: /75   Pulse 98   Temp 97.3  F (36.3  C)   Resp 18   Ht 1.524 m (5')   Wt 58.9 kg (129 lb 12.8 oz)   SpO2 92%   BMI 25.35 kg/m     General: Somewhat frail appearing but comfortable and in no acute distress  HEENT: No conjunctival pallor, no scleral icterus or injection, moist oral mucosa  Cardiovascular: Normal S1, S2, sounds irregular, no elevated JVP  Respiratory: Lungs clear to auscultation bilaterally; no wheezing, rhonchi, or crackles  GI: Abdomen soft, non-tender, non-distended, +BS  Extremities: Trace-1+ bilateral LE edema  Neuro: CX II-XII grossly intact; ROM in all four extremities grossly intact  Psych: Alert and oriented almost x3; normal affect  Skin: No acute rash    LABORATORY/IMAGING DATA:  All relevant labs and imaging data in [facility] and/or Care Everywhere were personally reviewed today.    Most Recent 3 CBC's:  Recent Labs   Lab Test 06/12/24  0618 06/06/24  0718 06/05/24  1039   WBC 6.4 10.9 10.8   HGB 11.9 13.0 12.9   MCV 88 85 85    185 162     Most Recent 3 BMP's:  Recent Labs   Lab Test 06/08/24  0642 06/06/24  1925 06/06/24  0718 06/05/24  1039 03/13/24  1100   NA  --   --  132* 133* 141   POTASSIUM 4.2 4.0 3.3* 3.7 4.6   CHLORIDE  --   --  94* 95* 103   CO2  --   --  27 26 30*   BUN  --   --  14.3 11.3 15.2   CR  --   --  0.47* 0.48* 0.70   ANIONGAP  --   --  11 12 8    JINA  --   --  8.7 9.2 9.6   GLC  --   --  145* 185* 96     Most Recent 2 LFT's:  Recent Labs   Lab Test 06/05/24  1039 05/21/21 2008   AST 27 15   ALT 14 18   ALKPHOS 66 63   BILITOTAL 1.1 1.0         ASSESSMENT/PLAN:  Acute pulmonary edema,  Suspected HFpEF (LVEF 55-60%, per echo in May 2010 and LVEF 68%, per nuclear stress test in May 2016),  Hypertension.  Patient was diuresed with IV furosemide and discharged on oral furosemide every other day.  Amlodipine dose was increased in the hospital.  No echocardiogram was obtained in the hospital per family request.  She currently weighs 129.8 LBS and has trace-1+ bilateral lower extremity edema.  Plan:  Continue furosemide 20 mg every 48 hours  Continue amlodipine 5 mg daily  Monitor blood pressure, weight, volume status, and renal function/electrolytes    Hyponatremia.  Mild.   Last sodium level 132 on June 6.  Plan:  Monitor sodium level periodically    Confusion.  Reportedly, since TCU admission.  Partly due to language barrier.  No evidence for localizing infection.  It has improved.  Today, she is oriented almost x 3.  Plan:  Standard delirium precautions  Monitor mental status  Formal cognitive evaluation per OT for safe discharge planning    Ectasia of the ascending thoracic aorta.  Measuring 3.7 cm (per CT on June 5, 2024).  Plan:  Follow-up as outpatient    GERD,  History of H. pylori infection.  Plan:  Continue omeprazole 20 mg daily    Depression,  Anxiety.  Plan:  Continue sertraline 50 mg daily  Continue alprazolam 0.25 mg 3 times daily as needed for anxiety through June 22, 2024  Monitor symptoms    Osteoporosis,  L4 and T12 compression fractures, subsequent encounter,  Osteoarthritis.  Back pain has markedly subsided.  Plan:  Continue pain management with acetaminophen 650 mg every 4 hours, gabapentin 100 mg nightly as needed, lidocaine patch, and topical diclofenac  Discontinue cyclobenzaprine  Monitor symptoms    Slow transit  constipation.  Plan:  Continue the bowel regimen     Physical deconditioning.  Plan:  Continue PT/OT evaluation and therapy         Orders written by provider at facility:  MANNY on June 24, DX: CHF, mild hyponatremia  Discontinue cyclobenzaprine      Recommendation by provider at facility:  May consider discontinuing aspirin       Total time: 50 minutes including face to face time with the patient, reviewing the notes, labs, and imaging in [facility] and [facility], adjusting medications, ordering new labs, communicating the plan of care with RN, and documenting all information electronically.       Disclaimer: This note contains text created using speech-recognition software and may have unintended word substitutions.    Electronically signed by:  Davey Nolen MD

## 2024-06-21 ENCOUNTER — TRANSITIONAL CARE UNIT VISIT (OUTPATIENT)
Dept: GERIATRICS | Facility: CLINIC | Age: 89
End: 2024-06-21
Payer: COMMERCIAL

## 2024-06-21 VITALS
RESPIRATION RATE: 18 BRPM | DIASTOLIC BLOOD PRESSURE: 75 MMHG | HEIGHT: 60 IN | WEIGHT: 129.8 LBS | TEMPERATURE: 97.3 F | HEART RATE: 98 BPM | SYSTOLIC BLOOD PRESSURE: 114 MMHG | OXYGEN SATURATION: 92 % | BODY MASS INDEX: 25.48 KG/M2

## 2024-06-21 DIAGNOSIS — R53.81 PHYSICAL DECONDITIONING: ICD-10-CM

## 2024-06-21 DIAGNOSIS — K59.01 SLOW TRANSIT CONSTIPATION: ICD-10-CM

## 2024-06-21 DIAGNOSIS — E87.1 HYPONATREMIA: ICD-10-CM

## 2024-06-21 DIAGNOSIS — R41.0 CONFUSION: ICD-10-CM

## 2024-06-21 DIAGNOSIS — F32.A DEPRESSION, UNSPECIFIED DEPRESSION TYPE: ICD-10-CM

## 2024-06-21 DIAGNOSIS — K21.9 GASTROESOPHAGEAL REFLUX DISEASE, UNSPECIFIED WHETHER ESOPHAGITIS PRESENT: ICD-10-CM

## 2024-06-21 DIAGNOSIS — F41.9 ANXIETY: ICD-10-CM

## 2024-06-21 DIAGNOSIS — J81.0 ACUTE PULMONARY EDEMA (H): Primary | ICD-10-CM

## 2024-06-21 DIAGNOSIS — M19.90 OSTEOARTHRITIS, UNSPECIFIED OSTEOARTHRITIS TYPE, UNSPECIFIED SITE: ICD-10-CM

## 2024-06-21 DIAGNOSIS — S32.040S CLOSED COMPRESSION FRACTURE OF L4 LUMBAR VERTEBRA, SEQUELA: ICD-10-CM

## 2024-06-21 DIAGNOSIS — I50.32 CHRONIC HEART FAILURE WITH PRESERVED EJECTION FRACTION (H): ICD-10-CM

## 2024-06-21 DIAGNOSIS — I77.810 ASCENDING AORTA DILATION (H): ICD-10-CM

## 2024-06-21 DIAGNOSIS — M81.0 AGE RELATED OSTEOPOROSIS, UNSPECIFIED PATHOLOGICAL FRACTURE PRESENCE: ICD-10-CM

## 2024-06-21 DIAGNOSIS — S22.080D COMPRESSION FRACTURE OF T12 VERTEBRA WITH ROUTINE HEALING, SUBSEQUENT ENCOUNTER: ICD-10-CM

## 2024-06-21 PROCEDURE — 99306 1ST NF CARE HIGH MDM 50: CPT | Performed by: INTERNAL MEDICINE

## 2024-06-23 ENCOUNTER — LAB REQUISITION (OUTPATIENT)
Dept: LAB | Facility: CLINIC | Age: 89
End: 2024-06-23

## 2024-06-23 DIAGNOSIS — I50.32 CHRONIC DIASTOLIC (CONGESTIVE) HEART FAILURE (H): ICD-10-CM

## 2024-06-23 DIAGNOSIS — E87.1 HYPO-OSMOLALITY AND HYPONATREMIA: ICD-10-CM

## 2024-06-24 ENCOUNTER — DISCHARGE SUMMARY NURSING HOME (OUTPATIENT)
Dept: GERIATRICS | Facility: CLINIC | Age: 89
End: 2024-06-24
Payer: COMMERCIAL

## 2024-06-24 VITALS
RESPIRATION RATE: 18 BRPM | OXYGEN SATURATION: 95 % | HEIGHT: 60 IN | TEMPERATURE: 97.6 F | BODY MASS INDEX: 25.11 KG/M2 | HEART RATE: 102 BPM | DIASTOLIC BLOOD PRESSURE: 78 MMHG | WEIGHT: 127.9 LBS | SYSTOLIC BLOOD PRESSURE: 117 MMHG

## 2024-06-24 DIAGNOSIS — K59.01 SLOW TRANSIT CONSTIPATION: ICD-10-CM

## 2024-06-24 DIAGNOSIS — S22.080D COMPRESSION FRACTURE OF T12 VERTEBRA WITH ROUTINE HEALING, SUBSEQUENT ENCOUNTER: ICD-10-CM

## 2024-06-24 DIAGNOSIS — I50.9 ACUTE ON CHRONIC CONGESTIVE HEART FAILURE, UNSPECIFIED HEART FAILURE TYPE (H): Primary | ICD-10-CM

## 2024-06-24 DIAGNOSIS — N30.01 ACUTE CYSTITIS WITH HEMATURIA: ICD-10-CM

## 2024-06-24 DIAGNOSIS — R53.81 PHYSICAL DECONDITIONING: ICD-10-CM

## 2024-06-24 DIAGNOSIS — S32.040S CLOSED COMPRESSION FRACTURE OF L4 LUMBAR VERTEBRA, SEQUELA: ICD-10-CM

## 2024-06-24 DIAGNOSIS — Z78.9 IMPAIRED MOBILITY AND ACTIVITIES OF DAILY LIVING: ICD-10-CM

## 2024-06-24 DIAGNOSIS — K59.00 CONSTIPATION, UNSPECIFIED CONSTIPATION TYPE: ICD-10-CM

## 2024-06-24 DIAGNOSIS — Z74.09 IMPAIRED MOBILITY AND ACTIVITIES OF DAILY LIVING: ICD-10-CM

## 2024-06-24 DIAGNOSIS — S32.040D CLOSED COMPRESSION FRACTURE OF L4 LUMBAR VERTEBRA WITH ROUTINE HEALING, SUBSEQUENT ENCOUNTER: ICD-10-CM

## 2024-06-24 DIAGNOSIS — J81.0 ACUTE PULMONARY EDEMA (H): ICD-10-CM

## 2024-06-24 DIAGNOSIS — F32.A DEPRESSION, UNSPECIFIED DEPRESSION TYPE: ICD-10-CM

## 2024-06-24 LAB
ANION GAP SERPL CALCULATED.3IONS-SCNC: 14 MMOL/L (ref 7–15)
BUN SERPL-MCNC: 13.5 MG/DL (ref 8–23)
CALCIUM SERPL-MCNC: 9.2 MG/DL (ref 8.2–9.6)
CHLORIDE SERPL-SCNC: 99 MMOL/L (ref 98–107)
CREAT SERPL-MCNC: 0.66 MG/DL (ref 0.51–0.95)
DEPRECATED HCO3 PLAS-SCNC: 25 MMOL/L (ref 22–29)
EGFRCR SERPLBLD CKD-EPI 2021: 77 ML/MIN/1.73M2
GLUCOSE SERPL-MCNC: 97 MG/DL (ref 70–99)
POTASSIUM SERPL-SCNC: 3.5 MMOL/L (ref 3.4–5.3)
SODIUM SERPL-SCNC: 138 MMOL/L (ref 135–145)

## 2024-06-24 PROCEDURE — 99316 NF DSCHRG MGMT 30 MIN+: CPT | Performed by: PHYSICIAN ASSISTANT

## 2024-06-24 PROCEDURE — P9604 ONE-WAY ALLOW PRORATED TRIP: HCPCS | Performed by: PHYSICIAN ASSISTANT

## 2024-06-24 PROCEDURE — 80048 BASIC METABOLIC PNL TOTAL CA: CPT | Performed by: PHYSICIAN ASSISTANT

## 2024-06-24 RX ORDER — ALPRAZOLAM 0.25 MG
0.25 TABLET ORAL 3 TIMES DAILY PRN
Qty: 20 TABLET | Refills: 0 | Status: SHIPPED | OUTPATIENT
Start: 2024-06-24

## 2024-06-24 RX ORDER — ALPRAZOLAM 0.25 MG
0.25 TABLET ORAL
Qty: 20 TABLET | Refills: 0 | Status: SHIPPED | OUTPATIENT
Start: 2024-06-24 | End: 2024-06-24

## 2024-06-24 RX ORDER — BISACODYL 10 MG
10 SUPPOSITORY, RECTAL RECTAL DAILY PRN
Qty: 10 SUPPOSITORY | Refills: 0 | Status: SHIPPED | OUTPATIENT
Start: 2024-06-24 | End: 2024-08-06

## 2024-06-24 RX ORDER — FUROSEMIDE 20 MG
20 TABLET ORAL
Qty: 15 TABLET | Refills: 1 | Status: SHIPPED | OUTPATIENT
Start: 2024-06-24

## 2024-06-24 RX ORDER — LIDOCAINE 4 G/G
1 PATCH TOPICAL EVERY 24 HOURS
Qty: 15 PATCH | Refills: 0 | Status: SHIPPED | OUTPATIENT
Start: 2024-06-24 | End: 2024-06-27

## 2024-06-24 RX ORDER — AMLODIPINE BESYLATE 5 MG/1
5 TABLET ORAL DAILY
Qty: 60 TABLET | Refills: 2 | Status: SHIPPED | OUTPATIENT
Start: 2024-06-24

## 2024-06-24 NOTE — PROGRESS NOTES
[facility] DISCHARGE SUMMARY  PATIENT'S NAME: Aracelis Blakely  YOB: 1922  MEDICAL RECORD NUMBER:  [id number]  Place of Service where encounter took place:  [facility] ([facility] [[id number]]    PRIMARY CARE PROVIDER AND CLINIC RESPONSIBLE AFTER TRANSFER:   Ervin Ledesma PA-C, [address]  / [place] [address]    [facility] Provider     Transferring providers: Nahun Cronin PA-C, Dr. Callum MD  Recent Hospitalization/ED:  [facility] stay 6/5/24 to 6/8/24.  Date of SNF Admission:  6/8/24  Date of SNF (anticipated) Discharge:  6/25/24  Discharged to: previous independent home  Cognitive Scores: not obtained  Physical Function: Ambulating 250 ft with FWW  DME: SNF  coordinating DME needs     CODE STATUS/ADVANCE DIRECTIVES DISCUSSION:  Full Code   ALLERGIES: Patient has no known allergies.    NURSING FACILITY COURSE   Medication Changes/Rationale:   As noted    Summary of nursing facility stay:   89yo female with PMHx HTN, anxiety who was admitted at [facility] from 6/5 - 6/8, 2024 after presenting with dizziness, hypertension, constipation. Also reported fall 2d PTA, imaging at [facility] neg and episode of chest tightness prior to ED presentation. Evaluation in ED included basic labs, EKG which were within baseline for pt. D-dimer elevated, BNP 1485. UA was abnormal. CT A/P with contrast neg for PE, noted age-indeterminate fracture of T12, findings c/w pulmonary edema and moderate amount of stool in rectum. Started on lasix every other day. UCx was neg, abx discontinued. TTE deferred given age, GOC. Started on bowel regimen for constipation, pain meds for back. Referred to TCU for rehab, med management.     Pt progressed in therapies at TCU, will be returning to independent home where she has caregivers available 11h per day. On day of encounter, family assist with translating, state she is continuing to do well. Pain  is minimal, she is tolerating oral intake and bowels have normalized on current regimen. No concerns. The following were managed during TCU stay:    HFpEF, acute on chronic  Pulmonary edema  Hypertension  As above, presented with episode of chest discomfort, sob. BNP 1485, CTA neg for PE, noted pleural effusions and pulmonary edema. Started on lasix 20mg every other day. TTE not pursued to align with GOC. Amlodipine increased to 5mg/d due to elevated bps. BPs controlled. Volume status stable.  -Continues on lasix 20mg every other day, amlodipine 5mg/d     Confusion, resolved  Family report confusion since TCU admission, has since improved and pt appears at baseline. She is sleeping well. Routine is different from home. Likely mild hypodelirium. UA/UC neg. Basic labs unremarkable.  -Supportive management  -Maintain sleep/wake cycle     Constipation, severe, improving  Initial reason for ED evaluation. Started on bowel regimen in hospital with slight improvement. On TCU admission with ongoing distention, discomfort. Improving on bowel regimen.  -Miralax daily, Senna 2 tab BID, dulcolax suppository daily PRN     T12 compression fracture  Physical deconditioning  Impaired mobility and ADLs  Pain controlled.  -Continue tylenol, lidoderm  -PT/OT continuing     Depression  Anxiety  Chronic, stable.  -Continues on zoloft, ativan    Discharge Medications:  MED REC REQUIRED  Post Medication Reconciliation Status: medication reconcilation previously completed during another office visit     Current Outpatient Medications   Medication Sig Dispense Refill    acetaminophen (TYLENOL) 325 MG tablet Take 650 mg by mouth every 4 hours as needed for mild pain Max acetaminophen dose: 4000 mg in 24 hrs      ALPRAZolam (XANAX) 0.25 MG tablet Take 1 tablet (0.25 mg) by mouth 3 times daily as needed for anxiety 10 tablet 0    amLODIPine (NORVASC) 5 MG tablet Take 1 tablet (5 mg) by mouth daily 60 tablet 2    Ascorbic Acid (VITAMIN  C/BIOFLAVONOIDS/ROSEHP PO) Take 1 tablet by mouth daily       aspirin (ASPIRIN LOW DOSE) 81 MG EC tablet TAKE 1 TABLET BY MOUTH EVERY DAY WITH FOOD 90 tablet 1    azelastine (OPTIVAR) 0.05 % SOLN ophthalmic solution Place 1 drop into both eyes 2 times daily      bisacodyl (DULCOLAX) 10 MG suppository Place 1 suppository (10 mg) rectally daily as needed for constipation      calcium carbonate 750 MG CHEW Take 750 mg by mouth as needed      calcium carbonate-vitamin D (OSCAL) 500-5 MG-MCG tablet Take 1 tablet by mouth 2 times daily (with meals) 180 tablet 1    cholecalciferol (VITAMIN D3) 1000 UNIT tablet Take 1,000 Units by mouth daily       cyanocobalamin (VITAMIN B-12) 1000 MCG tablet Take 1,000 mcg by mouth daily      diclofenac (VOLTAREN) 1 % topical gel Apply 4 g topically 2 times daily 200 g 0    furosemide (LASIX) 20 MG tablet Take 1 tablet (20 mg) by mouth every 48 hours      gabapentin (NEURONTIN) 100 MG capsule Take 1 capsule (100 mg) by mouth nightly as needed for neuropathic pain 60 capsule 1    Lidocaine (LIDOCARE) 4 % Patch Place 1 patch onto the skin every 24 hours To prevent lidocaine toxicity, patient should be patch free for 12 hrs daily. 15 patch 0    Multiple Vitamin (DAILY-NETTA MULTIVITAMIN) TABS Take 1 tablet by mouth daily 90 tablet 2    Multiple Vitamins-Minerals (CENTRUM FRESH/FRUITY 50+ PO) Take 1 tablet by mouth daily      olopatadine (PATADAY) 0.2 % ophthalmic solution Place 1 drop into both eyes as needed      Omega-3 Fatty Acids (OMEGA-3 FISH OIL) 1200 MG CAPS Take 1,200 mg by mouth daily       omeprazole (PRILOSEC) 20 MG DR capsule TAKE 1 CAPSULE BY MOUTH EVERY DAY BEFORE A MEAL 90 capsule 1    order for DME WALKER; with wheels, and brakes, and a seat. (Patient not taking: Reported on 6/13/2024) 1 Device 0    order for DME Equipment being ordered:  Compression stockings,knee high,mild compression (Patient not taking: Reported on 6/13/2024) 3 Units 3    order for DME Knee high  compression stockings.  Light compression.  16-20mm Hg.  Ok 3 pairs. (Patient not taking: Reported on 6/13/2024)      polyethylene glycol (MIRALAX) 17 GM/Dose powder Take 17 g by mouth 2 times daily (Patient taking differently: Take 17 g by mouth daily)      senna-docusate (SENOKOT-S/PERICOLACE) 8.6-50 MG tablet Take 1 tablet by mouth 2 times daily as needed for constipation (Patient taking differently: Take 2 tablets by mouth 2 times daily) 60 tablet 1    sertraline (ZOLOFT) 50 MG tablet Take 1 tablet (50 mg) by mouth daily 90 tablet 1        Controlled medications:   Medication ativan electronically prescribed to home pharmacy     Past Medical History:   Past Medical History:   Diagnosis Date    Hypertension      Physical Exam:   Vitals: /78   Pulse 102   Temp 97.6  F (36.4  C)   Resp 18   Ht 1.524 m (5')   Wt 58 kg (127 lb 14.4 oz)   SpO2 95%   BMI 24.98 kg/m    BMI: Body mass index is 24.98 kg/m .  GEN: well-developed, well-nourished, appears comfortable  HEENT: NCAT, EOM intact bilaterally, sclera clear, conjunctiva normal, nose & mouth patent, mucous membranes moist  CHEST: lungs CTA bilaterally, no increased work of breathing, no wheeze, crackles, rhonchi  HEART: RRR, S1 & S2  ABD: soft, nondistended, nontender, +BS in all 4 quadrants  MSK: AROM bilateral UE/LE  NEURO: awake, alert. CN II-XII grossly intact. Sensation grossly intact to light touch.   SKIN: warm & dry without rash, no pedal edema    SNF labs: Recent labs in [facility] reviewed by me today.  and Most Recent 3 CBC's:  Recent Labs   Lab Test 06/12/24  0618 06/06/24  0718 06/05/24  1039   WBC 6.4 10.9 10.8   HGB 11.9 13.0 12.9   MCV 88 85 85    185 162     Most Recent 3 BMP's:  Recent Labs   Lab Test 06/24/24  0800 06/08/24  0642 06/06/24  1925 06/06/24  0718 06/05/24  1039     --   --  132* 133*   POTASSIUM 3.5 4.2 4.0 3.3* 3.7   CHLORIDE 99  --   --  94* 95*   CO2 25  --   --  27 26   BUN 13.5  --   --  14.3 11.3   CR 0.66   --   --  0.47* 0.48*   ANIONGAP 14  --   --  11 12   JINA 9.2  --   --  8.7 9.2   GLC 97  --   --  145* 185*       DISCHARGE PLAN:  Follow up labs: No labs orders/due  Medical Follow Up:      Follow up with primary care provider in 1-2 weeks  [facility] scheduled appointments:     Discharge Services: Home Care:  Occupational Therapy, Physical Therapy, Speech Therapy , Registered Nurse, and Home Health Aide  Discharge Instructions Verbalized to Patient at Discharge:   Weigh yourself daily in the morning and keep a record. Call your primary clinic: a) if you are more short of breath, or b) if your weight changes more than 3 pounds in one day or more than 5 pounds in one week.     TOTAL DISCHARGE TIME:   Greater than 30min  Electronically signed by:  Nahun Cronin PA-C     Documentation of Face to Face and Certification for Home Health Services    I certify that services are/were furnished while this patient was under the care of a physician and that a physician or an allowed non-physician practitioner (NPP), had a face-to-face encounter that meets the physician face-to-face encounter requirements. The encounter was in whole, or in part, related to the primary reason for home health. The patient is confined to his/her home and needs intermittent skilled nursing, physical therapy, speech-language pathology, or the continued need for occupational therapy. A plan of care has been established by a physician and is periodically reviewed by a physician.  Date of Face-to-Face Encounter: 6/24/2024.    I certify that, based on my findings, the following services are medically necessary home health services: Nursing, Occupational Therapy, Physical Therapy, and Speech Language Therapy.    My clinical findings support the need for the above skilled services because: Requires assistance of another person or specialized equipment to access medical services because patient: Range of motion limitations prevents ability to exit  home safely...    Patient to re-establish plan of care with their PCP within 7-10 days after leaving the facility to reestablish care.  Medicare certified [place] provider: Nahun Cronin PA-C  Date: June 24, 2024

## 2024-06-25 DIAGNOSIS — K59.00 CONSTIPATION, UNSPECIFIED CONSTIPATION TYPE: ICD-10-CM

## 2024-06-25 DIAGNOSIS — I50.9 ACUTE ON CHRONIC CONGESTIVE HEART FAILURE, UNSPECIFIED HEART FAILURE TYPE (H): ICD-10-CM

## 2024-06-25 DIAGNOSIS — N30.01 ACUTE CYSTITIS WITH HEMATURIA: ICD-10-CM

## 2024-06-26 RX ORDER — AMOXICILLIN 250 MG
1 CAPSULE ORAL 2 TIMES DAILY PRN
Qty: 60 TABLET | Refills: 1 | Status: SHIPPED | OUTPATIENT
Start: 2024-06-26 | End: 2024-08-06

## 2024-06-27 ENCOUNTER — OFFICE VISIT (OUTPATIENT)
Dept: FAMILY MEDICINE | Facility: CLINIC | Age: 89
End: 2024-06-27
Payer: COMMERCIAL

## 2024-06-27 ENCOUNTER — PATIENT OUTREACH (OUTPATIENT)
Dept: GERIATRIC MEDICINE | Facility: CLINIC | Age: 89
End: 2024-06-27

## 2024-06-27 ENCOUNTER — MEDICAL CORRESPONDENCE (OUTPATIENT)
Dept: HEALTH INFORMATION MANAGEMENT | Facility: CLINIC | Age: 89
End: 2024-06-27

## 2024-06-27 VITALS
WEIGHT: 122.7 LBS | BODY MASS INDEX: 24.09 KG/M2 | HEART RATE: 95 BPM | RESPIRATION RATE: 13 BRPM | OXYGEN SATURATION: 97 % | SYSTOLIC BLOOD PRESSURE: 128 MMHG | DIASTOLIC BLOOD PRESSURE: 76 MMHG | HEIGHT: 60 IN | TEMPERATURE: 97.5 F

## 2024-06-27 DIAGNOSIS — S22.089D CLOSED FRACTURE OF TWELFTH THORACIC VERTEBRA WITH ROUTINE HEALING, UNSPECIFIED FRACTURE MORPHOLOGY, SUBSEQUENT ENCOUNTER: ICD-10-CM

## 2024-06-27 DIAGNOSIS — S32.040D CLOSED COMPRESSION FRACTURE OF L4 LUMBAR VERTEBRA WITH ROUTINE HEALING, SUBSEQUENT ENCOUNTER: ICD-10-CM

## 2024-06-27 DIAGNOSIS — N30.01 ACUTE CYSTITIS WITH HEMATURIA: ICD-10-CM

## 2024-06-27 DIAGNOSIS — Z74.09 IMPAIRED MOBILITY AND ACTIVITIES OF DAILY LIVING: ICD-10-CM

## 2024-06-27 DIAGNOSIS — K59.00 CONSTIPATION, UNSPECIFIED CONSTIPATION TYPE: ICD-10-CM

## 2024-06-27 DIAGNOSIS — I10 HYPERTENSION, UNSPECIFIED TYPE: Chronic | ICD-10-CM

## 2024-06-27 DIAGNOSIS — Z78.9 IMPAIRED MOBILITY AND ACTIVITIES OF DAILY LIVING: ICD-10-CM

## 2024-06-27 DIAGNOSIS — I50.9 ACUTE ON CHRONIC CONGESTIVE HEART FAILURE, UNSPECIFIED HEART FAILURE TYPE (H): ICD-10-CM

## 2024-06-27 DIAGNOSIS — R13.10 DYSPHAGIA, UNSPECIFIED TYPE: ICD-10-CM

## 2024-06-27 DIAGNOSIS — Z09 HOSPITAL DISCHARGE FOLLOW-UP: Primary | ICD-10-CM

## 2024-06-27 PROCEDURE — 99214 OFFICE O/P EST MOD 30 MIN: CPT | Performed by: PHYSICIAN ASSISTANT

## 2024-06-27 RX ORDER — LIDOCAINE 4 G/G
1 PATCH TOPICAL EVERY 24 HOURS
Qty: 15 PATCH | Refills: 0 | Status: SHIPPED | OUTPATIENT
Start: 2024-06-27

## 2024-06-27 ASSESSMENT — PAIN SCALES - GENERAL: PAINLEVEL: NO PAIN (0)

## 2024-06-27 NOTE — PROGRESS NOTES
Assessment & Plan     Hospital discharge follow-up  Dysphagia  Physical exam today unremarkable.  Referral placed for home care swallow study.  Encouraged liquids and soft food.    Acute on chronic congestive heart failure, unspecified heart failure type (H)  Hypertension, unspecified type  Continue increased dose of amlodipine and every other day furosemide.  Appears euvolemic today.  6-week follow-up.    Constipation, unspecified constipation type  Continue at home for bowel regimen.    Impaired mobility and activities of daily living  Did sign up DME order for a bidet.  Will discuss with medical supply company.  - Miscellaneous Order for DME - (Use only if a more specific DME order does not already exist  - Home Care Referral    Acute cystitis with hematuria  Resolved    Closed compression fracture of L4 lumbar vertebra with routine healing, subsequent encounter  Closed fracture of twelfth thoracic vertebra with routine healing, unspecified fracture morphology, subsequent encounter  Tylenol.  Lidocaine patches refilled.  Continue home care.      MED REC REQUIRED  Post Medication Reconciliation Status: discharge medications reconciled, continue medications without change      Subjective   Aracelis is a 89 year old, presenting for the following health issues:  Hospital F/U    Here today accompanied by her daughter and son-in-law for hospital discharge follow-up.  Spent time at TCU as well.  Now back home with home care.  Initial home care evaluation today.  Has a care coordinator.    -Amlodipine increased to 5 mg in the hospital.  Lasix 20 mg added every other day.  Weight decreased.  Notes no leg swelling.  Denies shortness of breath or chest pain.  Family declined echo in the hospital.  CT chest revealed T12 vertebral body fracture.  Trace bilateral pleural effusions.  Constipation.  Had a bowel movement today.    -Note some difficulty swallowing since hospital discharge.  Per family home health care team will  be sending out occupational therapy for swallow study.    -Son-in-law thinks it would be helpful for her to have a bidet, which is ordered as DME.      Wt Readings from Last 10 Encounters:   06/27/24 55.7 kg (122 lb 11.2 oz)   06/24/24 58 kg (127 lb 14.4 oz)   06/21/24 58.9 kg (129 lb 12.8 oz)   06/18/24 58.6 kg (129 lb 3.2 oz)   06/14/24 59.8 kg (131 lb 12.8 oz)   06/05/24 67.1 kg (147 lb 14.9 oz)   03/13/24 63.1 kg (139 lb 1.6 oz)   08/18/23 66.5 kg (146 lb 8 oz)   03/08/23 66 kg (145 lb 9.6 oz)   11/15/22 66.7 kg (147 lb)       Hospital Follow-up Visit:    Hospital/Nursing Home/ Rehab Facility: [facility]  Date of Admission: 6/5/2024  Date of Discharge: 6/8/2024  Reason(s) for Admission: Heart Failure, SOB  Was the patient in the ICU or did the patient experience delirium during hospitalization?  No  Do you have any other stressors you would like to discuss with your provider? No    Problems taking medications regularly:  None  Medication changes since discharge: None  Problems adhering to non-medication therapy:  None    Summary of hospitalization:  [facility] discharge summary reviewed  Diagnostic Tests/Treatments reviewed.  Follow up needed:   Other Healthcare Providers Involved in Patient s Care: Home care           Update since discharge: improved.         Plan of care communicated with patient and family                 Review of Systems  Constitutional, neuro, ENT, endocrine, pulmonary, cardiac, gastrointestinal, genitourinary, musculoskeletal, integument and psychiatric systems are negative, except as otherwise noted.      Objective    /76   Pulse 95   Temp 97.5  F (36.4  C) (Tympanic)   Resp 13   Ht 1.524 m (5')   Wt 55.7 kg (122 lb 11.2 oz)   SpO2 97%   BMI 23.96 kg/m    Body mass index is 23.96 kg/m .  Physical Exam   GENERAL: alert and no distress  EYES: Eyes grossly normal to inspection, PERRL and conjunctivae and sclerae normal  HENT: ear  canals and TM's normal, nose and mouth without ulcers or lesions  NECK: no adenopathy, no asymmetry, masses, or scars  RESP: lungs clear to auscultation - no rales, rhonchi or wheezes  CV: regular rate and rhythm, normal S1 S2, no S3 or S4, no murmur, click or rub, no peripheral edema  ABDOMEN: soft, nontender, no hepatosplenomegaly, no masses  MS: no gross musculoskeletal defects noted, no edema  SKIN: no suspicious lesions or rashes  NEURO: Normal strength and tone, mentation intact and speech normal  PSYCH: mentation appears normal, affect normal/bright    The likelihood of other entities in the differential is insufficient to justify any further testing for them at this time. This was explained to the patient. The patient was advised that persistent or worsening symptoms would require further evaluation. Patient advised to call the office and if unable to reach to go to the emergency room if they develop any new or worsening symptoms. Expressed understanding and agreement with above stated plan.         Signed Electronically by: Ervin Ledesma PA-C

## 2024-06-27 NOTE — PROGRESS NOTES
[facility] Care Coordination Contact      [facility] Six-Month Telephone Assessment    6 month telephone assessment completed on 6/27/24.    ER visits: No  Hospitalizations: Yes -  [facility]  TCU stays: Yes -  [facility]  Significant health status changes: No  Falls/Injuries: No  ADL/IADL changes: No  Changes in services: Yes, home care for PT/OT.    Caregiver Assessment follow up: Completed with son in-law. Son in-law was inquiring about installing a bidet or other DME that would help with independence while using the toilet. Is okay with having home care evaluate for appropriate DME. Son in-law will call CC w/ name of home care provider once known.     Goals: See POC in chart for goal progress documentation.      Will see member in 6 months for an annual health risk assessment.   Encouraged member to call CC with any questions or concerns in the meantime.     MARTA Lino  [facility]  [phone]  Fax: [phone]

## 2024-06-28 NOTE — PROGRESS NOTES
[facility] (Phone: [phone], Intake Fax: [phone]) will be following for home care.    MARTA Lino  [facility]  [phone]  Fax: [phone]

## 2024-07-01 ENCOUNTER — HOSPITAL ENCOUNTER (EMERGENCY)
Facility: CLINIC | Age: 89
Discharge: HOME | End: 2024-07-02
Attending: EMERGENCY MEDICINE | Admitting: EMERGENCY MEDICINE
Payer: COMMERCIAL

## 2024-07-01 ENCOUNTER — APPOINTMENT (OUTPATIENT)
Dept: GENERAL RADIOLOGY | Facility: CLINIC | Age: 89
End: 2024-07-01
Attending: EMERGENCY MEDICINE
Payer: COMMERCIAL

## 2024-07-01 VITALS
RESPIRATION RATE: 16 BRPM | SYSTOLIC BLOOD PRESSURE: 136 MMHG | HEART RATE: 99 BPM | OXYGEN SATURATION: 96 % | TEMPERATURE: 98.8 F | DIASTOLIC BLOOD PRESSURE: 81 MMHG

## 2024-07-01 DIAGNOSIS — R53.1 WEAKNESS: ICD-10-CM

## 2024-07-01 DIAGNOSIS — N30.01 ACUTE CYSTITIS WITH HEMATURIA: ICD-10-CM

## 2024-07-01 LAB
ALBUMIN SERPL BCG-MCNC: 3.1 G/DL (ref 3.5–5.2)
ALBUMIN UR-MCNC: NEGATIVE MG/DL
ALP SERPL-CCNC: 93 U/L (ref 40–150)
ALT SERPL W P-5'-P-CCNC: 8 U/L (ref 0–50)
ANION GAP SERPL CALCULATED.3IONS-SCNC: 8 MMOL/L (ref 7–15)
APPEARANCE UR: ABNORMAL
AST SERPL W P-5'-P-CCNC: 14 U/L (ref 0–45)
BACTERIA #/AREA URNS HPF: ABNORMAL /HPF
BASOPHILS # BLD AUTO: 0 10E3/UL (ref 0–0.2)
BASOPHILS NFR BLD AUTO: 0 %
BILIRUB SERPL-MCNC: 0.3 MG/DL
BILIRUB UR QL STRIP: NEGATIVE
BUN SERPL-MCNC: 9.6 MG/DL (ref 8–23)
CALCIUM SERPL-MCNC: 7.9 MG/DL (ref 8.2–9.6)
CHLORIDE SERPL-SCNC: 103 MMOL/L (ref 98–107)
COLOR UR AUTO: ABNORMAL
CREAT SERPL-MCNC: 0.53 MG/DL (ref 0.51–0.95)
DEPRECATED HCO3 PLAS-SCNC: 24 MMOL/L (ref 22–29)
EGFRCR SERPLBLD CKD-EPI 2021: 81 ML/MIN/1.73M2
EOSINOPHIL # BLD AUTO: 0.1 10E3/UL (ref 0–0.7)
EOSINOPHIL NFR BLD AUTO: 2 %
ERYTHROCYTE [DISTWIDTH] IN BLOOD BY AUTOMATED COUNT: 11.7 % (ref 10–15)
GLUCOSE SERPL-MCNC: 131 MG/DL (ref 70–99)
GLUCOSE UR STRIP-MCNC: NEGATIVE MG/DL
HCT VFR BLD AUTO: 35.7 % (ref 35–47)
HGB BLD-MCNC: 11.5 G/DL (ref 11.7–15.7)
HGB UR QL STRIP: ABNORMAL
IMM GRANULOCYTES # BLD: 0 10E3/UL
IMM GRANULOCYTES NFR BLD: 0 %
KETONES UR STRIP-MCNC: NEGATIVE MG/DL
LACTATE SERPL-SCNC: 1.4 MMOL/L (ref 0.7–2)
LEUKOCYTE ESTERASE UR QL STRIP: ABNORMAL
LYMPHOCYTES # BLD AUTO: 1 10E3/UL (ref 0.8–5.3)
LYMPHOCYTES NFR BLD AUTO: 17 %
MCH RBC QN AUTO: 27.2 PG (ref 26.5–33)
MCHC RBC AUTO-ENTMCNC: 32.2 G/DL (ref 31.5–36.5)
MCV RBC AUTO: 84 FL (ref 78–100)
MONOCYTES # BLD AUTO: 0.6 10E3/UL (ref 0–1.3)
MONOCYTES NFR BLD AUTO: 10 %
NEUTROPHILS # BLD AUTO: 4.4 10E3/UL (ref 1.6–8.3)
NEUTROPHILS NFR BLD AUTO: 71 %
NITRATE UR QL: POSITIVE
NRBC # BLD AUTO: 0 10E3/UL
NRBC BLD AUTO-RTO: 0 /100
PH UR STRIP: 6.5 [PH] (ref 5–7)
PLATELET # BLD AUTO: 215 10E3/UL (ref 150–450)
POTASSIUM SERPL-SCNC: 3.3 MMOL/L (ref 3.4–5.3)
PROT SERPL-MCNC: 5.3 G/DL (ref 6.4–8.3)
RBC # BLD AUTO: 4.23 10E6/UL (ref 3.8–5.2)
RBC URINE: 1 /HPF
SODIUM SERPL-SCNC: 135 MMOL/L (ref 135–145)
SP GR UR STRIP: 1 (ref 1–1.03)
TROPONIN T SERPL HS-MCNC: 20 NG/L
UROBILINOGEN UR STRIP-MCNC: NORMAL MG/DL
WBC # BLD AUTO: 6.2 10E3/UL (ref 4–11)
WBC CLUMPS #/AREA URNS HPF: PRESENT /HPF
WBC URINE: 113 /HPF

## 2024-07-01 PROCEDURE — 84484 ASSAY OF TROPONIN QUANT: CPT | Performed by: EMERGENCY MEDICINE

## 2024-07-01 PROCEDURE — 93005 ELECTROCARDIOGRAM TRACING: CPT

## 2024-07-01 PROCEDURE — 250N000013 HC RX MED GY IP 250 OP 250 PS 637: Performed by: EMERGENCY MEDICINE

## 2024-07-01 PROCEDURE — 36415 COLL VENOUS BLD VENIPUNCTURE: CPT | Performed by: EMERGENCY MEDICINE

## 2024-07-01 PROCEDURE — 99285 EMERGENCY DEPT VISIT HI MDM: CPT | Mod: 25

## 2024-07-01 PROCEDURE — 81001 URINALYSIS AUTO W/SCOPE: CPT | Performed by: EMERGENCY MEDICINE

## 2024-07-01 PROCEDURE — 83605 ASSAY OF LACTIC ACID: CPT | Performed by: EMERGENCY MEDICINE

## 2024-07-01 PROCEDURE — 87086 URINE CULTURE/COLONY COUNT: CPT | Performed by: EMERGENCY MEDICINE

## 2024-07-01 PROCEDURE — 80053 COMPREHEN METABOLIC PANEL: CPT | Performed by: EMERGENCY MEDICINE

## 2024-07-01 PROCEDURE — 71046 X-RAY EXAM CHEST 2 VIEWS: CPT

## 2024-07-01 PROCEDURE — 87186 SC STD MICRODIL/AGAR DIL: CPT | Performed by: EMERGENCY MEDICINE

## 2024-07-01 PROCEDURE — 85004 AUTOMATED DIFF WBC COUNT: CPT | Performed by: EMERGENCY MEDICINE

## 2024-07-01 RX ORDER — CEPHALEXIN 500 MG/1
500 CAPSULE ORAL 3 TIMES DAILY
Qty: 21 CAPSULE | Refills: 0 | Status: SHIPPED | OUTPATIENT
Start: 2024-07-01 | End: 2024-07-01

## 2024-07-01 RX ORDER — CEPHALEXIN 500 MG/1
500 CAPSULE ORAL ONCE
Status: COMPLETED | OUTPATIENT
Start: 2024-07-01 | End: 2024-07-01

## 2024-07-01 RX ORDER — CEPHALEXIN 250 MG/5ML
500 POWDER, FOR SUSPENSION ORAL 3 TIMES DAILY
Qty: 210 ML | Refills: 0 | Status: SHIPPED | OUTPATIENT
Start: 2024-07-01 | End: 2024-07-08

## 2024-07-01 RX ADMIN — CEPHALEXIN 500 MG: 500 CAPSULE ORAL at 23:16

## 2024-07-01 ASSESSMENT — ACTIVITIES OF DAILY LIVING (ADL)
ADLS_ACUITY_SCORE: 39

## 2024-07-02 ENCOUNTER — PATIENT OUTREACH (OUTPATIENT)
Dept: GERIATRIC MEDICINE | Facility: CLINIC | Age: 89
End: 2024-07-02
Payer: COMMERCIAL

## 2024-07-02 LAB
ATRIAL RATE - MUSE: 107 BPM
DIASTOLIC BLOOD PRESSURE - MUSE: NORMAL MMHG
INTERPRETATION ECG - MUSE: NORMAL
P AXIS - MUSE: 62 DEGREES
PR INTERVAL - MUSE: 200 MS
QRS DURATION - MUSE: 84 MS
QT - MUSE: 328 MS
QTC - MUSE: 437 MS
R AXIS - MUSE: -68 DEGREES
SYSTOLIC BLOOD PRESSURE - MUSE: NORMAL MMHG
T AXIS - MUSE: 65 DEGREES
VENTRICULAR RATE- MUSE: 107 BPM

## 2024-07-02 NOTE — ED NOTES
Bed: ED12  Expected date:   Expected time:   Means of arrival:   Comments:  Jade 2 89F fatigue, failure to thrive, generalized weakness, eta 1932

## 2024-07-02 NOTE — PROGRESS NOTES
TRANSITIONS OF CARE (RADHA) LOG    RADHA tasks should be completed by the CC within one (1) business day of notification of each transition. Follow up contact with member is required after return to their usual care setting.  Note:  If CC finds out about the transitions fifteen (15) days or more after the member has returned to their usual care setting, no RADHA log is needed. However, the CC should check in with the member to discuss the transition process, any changes needed to the care plan and document it in a case note.     Member Name:  Aracelis Blakely [facility] Name:  [employer] Member ID#: [id number]   Product: [facility] Care Coordinator Contact:  MARTA Lino Agency/[facility]/Care System: [facility]   Transition Communication Actions from Care Management Contact   Transition #1   Notification Date: 07/02/2024 Transition Date:   07/01/2024 Transition From: Home     Is this the member s usual care setting?               yes Transition To: [facility]   Transition Type:  Unplanned    Documentation from conversation with the member/responsible party, provider, discharging and receiving facility:   Date: 07/02/24: Received notification of admission to hospital with dx of Generalized Weakness and Nausea & Vomiting   Notified after discharge.  Reviewed and update care plan as needed. Transition log initiated.   PCP, Ervin Ledesma, notified of hospitalization via EMR.  [facility] [phone], Fax: [phone]    Transition #2   Notification Date: 07/02/2024 Transition Date:   07/02/2024 Transition From: [facility]     Is this the member s usual care setting?               no Transition To: Home   Transition Type:  Planned    Documentation from conversation with the member/responsible party, provider, discharging and receiving facility:   Date: 07/02/24: Received notification of  transition to home.  CC contacted family son in-law  and reviewed discharge summary. Aracelis is still having some confusing. Difficulty with swallowing. Abx changed to liquid form. First visit with [facility]. PT is today. Per son, ST is supposed to be added also.  Member has a follow-up appointment with PCP in 7 days: No: Offered Assistance with setting up a follow up appointment. Was instructed to f/u w/ PCP in a few weeks for a urine culture to make sure infection has cleared.  Member has had a change in condition: No  Home visit needed: No  Care plan reviewed and updated.  New referrals placed: No  Transition log completed.   PCP, Ervin Ledesma, notified of transition back to home via EMR.  Flora Luis, [facility] [phone], Fax: [phone]      *RETURN TO USUAL CARE SETTING: *Complete tasks below when the member is discharging TO their usual care setting within one (1) business day of notification..      For situations where the Care Coordinator is notified of the discharge prior to the date of discharge, the Care Coordinator must follow up with the member or designated representative to confirm that discharge actually occurred and discuss required RADHA tasks as outlined in the RADHA Instructions.  (This includes situations where it may be a  new  usual care setting for the member. (i.e., a community member who decides upon permanent nursing home placement following hospitalization and rehab).    Discuss with Member/Responsible Party:    Check  Yes  - if the member, family member and/or SNF/facility staff manages the following:    If  No  provide explanation in the comments section.          Date completed: 07/02/2024 Communicated with member or their designated representative about the following:  care transition process; about changes to the member s health status; plan of care updates; education about transitions and how to prevent unplanned transitions/readmissions    Four  Pillars for Optimal Transition:    Check  Yes  - if the member, family member and/or SNF/facility staff manages the following:    If  No  provide explanation in the comments section.          []  Yes     [x]  No Does the member have a follow-up appointment scheduled with primary care or specialist? (Mental health hospitalizations--the appt. should be w/in 7 days)              For mental health hospitalizations:  []  Yes     []  No     Does the member have a follow-up appointment scheduled with a mental health practitioner within 7 days of discharge?  [x]  Yes     []  No     Has a medication review been completed with member? If no, refer to PCP, home care nurse, MTM, pharmacist  [x]  Yes     []  No     Can the member manage their medications or is there a system in place to manage medications (e.g. home care set-up)?         [x]  Yes     []  No     Can the member verbalize warning signs and symptoms to watch for and how to respond?  [x]  Yes     []  No     Does the member have a copy of and understand their discharge instructions?  If no, assist to obtain copy of discharge instructions, review discharge instructions, and assist to contact PCP to discuss questions about their recent hospitalization.  [x]  Yes     []  No     Does the member have adequate food, housing and transportation?  If no, add goal and discuss additional supports available to the member                                                                                                                                                                                 [x]  Yes     []  No     Is the member safe in their home?  If no, document needs and support provided                                                                                                                                                                          []  Yes     [x]  No     Are there any concerns of vulnerability, abuse, or neglect?  If yes, document concerns and actions  taken by Care Coordinator as a mandated                                                                                                                                                                              [x]  Yes     []  No     Does the member use a Personal Health Care Record?  Check  Yes  if visit summary, discharge summary, and/or healthcare summary are being used as a PHR.                                                                                                                                                                                  [x]  Yes     []  No     Have you reviewed the discharge summary with the member? If  No  provide explanation in comments.  [x]  Yes     []  No     Have you updated the member s care plan/support plan? Add new diagnosis, medications, treatments, goals & interventions, as applicable. If No, provide explanation in comments.    Comments:         Flora Luis, [facility]  [phone]  Fax: [phone]

## 2024-07-02 NOTE — ED PROVIDER NOTES
Emergency Department Note      History of Present Illness     Chief Complaint   Generalized Weakness and Nausea & Vomiting      HPI   Aracelis Blakely is a 89 year old female with a history of hypertension who presents to the ED with her daughter for evaluation of generalized weakness, nausea, and vomiting. With assistance from her daughter to provide a history, the patient has been complaining of generalized weakness. Adds that the patient is also experiencing difficulty swallowing accompanied by nausea and episodes of emesis when attempting to eat or drink. The patient refuses any urinary symptoms or abdominal pain at this time. Notably, the patient recently was discharged from [facility] after having a T12 compression fracture after a fall.    Independent Historian   Daughter as detailed above.    Review of External Notes   I reviewed the discharge summary on 06/24/24 regarding the patient's most recent admission.     I reviewed the urine culture results from 06/05/24.     Past Medical History     Medical History and Problem List   Osteoporosis  GERD  Degenerative Joint Disease, spine  Anxiety  Hypertension       Medications   Xanax  Norvasc  Aspirin, 81 mg  Dulcolax  Lasix  Prilosec  Senna   Zoloft     Surgical History   Cholecystectomy  Release Carpal Tunnel, Right  Total Abdominal Hysterectomy  KPE IOL  Blepharoplasty, bilateral   Hand Surgery, unspecified     Physical Exam     Patient Vitals for the past 24 hrs:   BP Temp Temp src Pulse Resp SpO2   07/01/24 2300 136/81 -- -- 99 -- 96 %   07/01/24 2200 128/85 -- -- 100 -- 96 %   07/01/24 2130 118/84 -- -- 105 -- 95 %   07/01/24 2115 (!) 135/92 -- -- 71 -- 97 %   07/01/24 2100 (!) 142/85 -- -- 105 -- --   07/01/24 2045 125/81 -- -- 102 -- 96 %   07/01/24 2030 131/78 -- -- 107 -- 96 %   07/01/24 2015 131/81 -- -- 111 -- 96 %   07/01/24 2000 135/76 -- -- 110 -- 96 %   07/01/24 1950 134/88 -- -- 114 -- 96 %   07/01/24 1949 134/88 98.8  F (37.1  C) Oral 112  16 91 %     Physical Exam  General: Alert, No distress. Nontoxic appearance  Head: No signs of trauma.   Mouth/Throat: Oropharynx moist.   Eyes: Conjunctivae are normal. Pupils are equal..   Neck: Normal range of motion.    CV: Appears well perfused.  Resp:No respiratory distress.  Few scattered crackles present in left lung base.   Abdomen: Soft nontender nondistended  MSK: Normal range of motion. No obvious deformity.   Neuro: The patient is alert and interactive. VYAS. Speech normal. GCS 15  Skin: No lesion or sign of trauma noted.   Psych: normal mood and affect. behavior is normal.       Diagnostics     Lab Results   Labs Ordered and Resulted from Time of ED Arrival to Time of ED Departure   COMPREHENSIVE METABOLIC PANEL - Abnormal       Result Value    Sodium 135      Potassium 3.3 (*)     Carbon Dioxide (CO2) 24      Anion Gap 8      Urea Nitrogen 9.6      Creatinine 0.53      GFR Estimate 81      Calcium 7.9 (*)     Chloride 103      Glucose 131 (*)     Alkaline Phosphatase 93      AST 14      ALT 8      Protein Total 5.3 (*)     Albumin 3.1 (*)     Bilirubin Total 0.3     TROPONIN T, HIGH SENSITIVITY - Abnormal    Troponin T, High Sensitivity 20 (*)    ROUTINE UA WITH MICROSCOPIC REFLEX TO CULTURE - Abnormal    Color Urine Light Yellow      Appearance Urine Slightly Cloudy (*)     Glucose Urine Negative      Bilirubin Urine Negative      Ketones Urine Negative      Specific Gravity Urine 1.005      Blood Urine Trace (*)     pH Urine 6.5      Protein Albumin Urine Negative      Urobilinogen Urine Normal      Nitrite Urine Positive (*)     Leukocyte Esterase Urine Large (*)     Bacteria Urine Moderate (*)     WBC Clumps Urine Present (*)     RBC Urine 1      WBC Urine 113 (*)    CBC WITH PLATELETS AND DIFFERENTIAL - Abnormal    WBC Count 6.2      RBC Count 4.23      Hemoglobin 11.5 (*)     Hematocrit 35.7      MCV 84      MCH 27.2      MCHC 32.2      RDW 11.7      Platelet Count 215      % Neutrophils 71       % Lymphocytes 17      % Monocytes 10      % Eosinophils 2      % Basophils 0      % Immature Granulocytes 0      NRBCs per 100 WBC 0      Absolute Neutrophils 4.4      Absolute Lymphocytes 1.0      Absolute Monocytes 0.6      Absolute Eosinophils 0.1      Absolute Basophils 0.0      Absolute Immature Granulocytes 0.0      Absolute NRBCs 0.0     LACTIC ACID WHOLE BLOOD - Normal    Lactic Acid 1.4     URINE CULTURE       Imaging   XR Chest 2 Views   Final Result   IMPRESSION: Negative chest.        Independent Interpretation   None    ED Course      Medications Administered   Medications   cephALEXin (KEFLEX) capsule 500 mg (500 mg Oral $Given 7/1/24 2318)       Procedures   Procedures     Discussion of Management   None    ED Course   ED Course as of 07/02/24 0048   Mon Jul 01, 2024 2015 I obtained history and examined the patient as noted above.    2300 I rechecked and updated the patient.    2349 I rechecked and updated the patient.        Optional/Additional Documentation  None    Medical Decision Making / Diagnosis     WOLFGANG Blakely is a 89 year old female who presents for evaluation of weakness.  Associated symptoms today have included nausea and vomiting.  The workup here in the emergency room was to evaluate for infections, metabolic, electrolyte, neurologic or cardiovascular causes.  There is no signs of pneumonia causing the symptoms.    However, there is evidence of a urinary tract infection based on the urinalysis.  Urine culture is pending.      I do not believe symptoms are due to CVA, TIA, myasthesia gravis, myopathy or acute coronary syndromes and there are no indications at this point for a further workup with a benign history, exam and laboratory tests.      She was able to take the first dose of Keflex in the capsule form.  She did have a foreign body sensation after swallowing the pill but was able to continue to drink water.  We changed the discharge antibiotics to a liquid form  rather than a capsule.  The urine culture is pending.  Recent previous urine culture shows multiple different organisms and antibiotics were stopped prior to discharge from the hospital    Disposition   The patient was discharged.     Diagnosis     ICD-10-CM    1. Weakness  R53.1       2. Acute cystitis with hematuria  N30.01            Discharge Medications   Discharge Medication List as of 7/2/2024 12:02 AM        START taking these medications    Details   cephALEXin (KEFLEX) 250 MG/5ML suspension Take 10 mLs (500 mg) by mouth 3 times daily for 7 days, Disp-210 mL, R-0, E-Prescribe           Scribe Disclosure:  I, Nery Camarillo, am serving as a scribe at 8:33 PM on 7/1/2024 to document services personally performed by Higinio Sutton MD based on my observations and the provider's statements to me.        Higinio Sutton MD  07/02/24 0053

## 2024-07-02 NOTE — ED NOTES
Patient was given ABX with apple sauce and pudding, now patient is feeling like she has the pill stuck in her throat family says she can't vomit.

## 2024-07-02 NOTE — ED TRIAGE NOTES
Pt BIBA from home c/o inability to eat for last 2 weeks due to NV, gen weakness for 3 weeks,baseline 2 LPM NC, , ABCD intact.       Triage Assessment (Adult)       Row Name 07/01/24 1950          Triage Assessment    Airway WDL WDL        Respiratory WDL    Respiratory WDL WDL        Skin Circulation/Temperature WDL    Skin Circulation/Temperature WDL WDL        Cardiac WDL    Cardiac WDL X;rhythm     Pulse Rate & Regularity tachycardic        Peripheral/Neurovascular WDL    Peripheral Neurovascular WDL WDL        Cognitive/Neuro/Behavioral WDL    Cognitive/Neuro/Behavioral WDL WDL

## 2024-07-03 ENCOUNTER — MEDICAL CORRESPONDENCE (OUTPATIENT)
Dept: HEALTH INFORMATION MANAGEMENT | Facility: CLINIC | Age: 89
End: 2024-07-03

## 2024-07-03 ENCOUNTER — PATIENT OUTREACH (OUTPATIENT)
Dept: GERIATRIC MEDICINE | Facility: CLINIC | Age: 89
End: 2024-07-03
Payer: COMMERCIAL

## 2024-07-03 LAB — BACTERIA UR CULT: ABNORMAL

## 2024-07-04 ENCOUNTER — TELEPHONE (OUTPATIENT)
Dept: NURSING | Facility: CLINIC | Age: 89
End: 2024-07-04
Payer: COMMERCIAL

## 2024-07-04 NOTE — TELEPHONE ENCOUNTER
[facility]    Reason for call: Lab Result Notification     Lab Result (including Rx patient on, if applicable).  If culture, copy of lab report at bottom.  Lab Result: Urine Culture - see below    ED Rx: CephALEXin (KEFLEX) 250 MG/5ML suspension Take 10 mLs (500 mg) by mouth 3 times daily for 7 days - RESISTANT    Creatinine Level (mg/dl)   Creatinine   Date Value Ref Range Status   07/01/2024 0.53 0.51 - 0.95 mg/dL Final   05/22/2021 0.54 0.52 - 1.04 mg/dL Final    Creatinine clearance (ml/min), if applicable    Serum creatinine: 0.53 mg/dL 07/01/24 2039  Estimated creatinine clearance: 42 mL/min     Patient presented to [facility] ED on 7/1/24 with generalized weakness, nausea, and vomiting. Pt also having difficulty swallowing. No urinary symptoms or abdominal pain    RN Recommendations/Instructions per [place] ED lab result protocol:   [facility] ED lab result protocol utilized: Urine Culture - final positive     Suggested RX - possibly Cipro 250mg BID x 3 days - need to assess ability to swallow      Unable to reach patient/caregiver.     Left voicemail message requesting a call back to [phone] between 9 a.m. and 5:30 p.m. for patient's ED/UC lab results.      Letter pended to be sent via [employer].      Lisa Holley RN

## 2024-07-04 NOTE — LETTER
July 4, 2024        Aracelis Blakely  [address] [address]          Dear Aracelis Blakely:    You were seen in the [facility] Emergency Department at [facility] on 7/1/2024.  We are unable to reach you by phone, so we are sending you this letter.     It is important that you call [facility] Emergency Department lab result nurse at [phone], as we have information to relay to you AND/OR we MAY have to make some changes in your treatment.    Best time to call back is between 9AM and 5:30PM, 7 days a week.      Sincerely,     [facility] Emergency Department Lab Result RN  [phone]

## 2024-07-09 DIAGNOSIS — Z53.9 DIAGNOSIS NOT YET DEFINED: Primary | ICD-10-CM

## 2024-07-09 PROCEDURE — G0180 MD CERTIFICATION HHA PATIENT: HCPCS | Performed by: PHYSICIAN ASSISTANT

## 2024-07-10 ENCOUNTER — MEDICAL CORRESPONDENCE (OUTPATIENT)
Dept: HEALTH INFORMATION MANAGEMENT | Facility: CLINIC | Age: 89
End: 2024-07-10

## 2024-07-11 ENCOUNTER — PATIENT OUTREACH (OUTPATIENT)
Dept: GERIATRIC MEDICINE | Facility: CLINIC | Age: 89
End: 2024-07-11
Payer: COMMERCIAL

## 2024-07-11 NOTE — PROGRESS NOTES
[facility] Care Coordination Contact    CHW spoke w/ mbr's son in [place] to get update on MA renewal. [facility] indicated that MA renewal paperwork was submitted back in May and everything is set.     MATIAS Cazares  [facility]  [phone]

## 2024-07-17 DIAGNOSIS — I50.9 ACUTE ON CHRONIC CONGESTIVE HEART FAILURE, UNSPECIFIED HEART FAILURE TYPE (H): ICD-10-CM

## 2024-07-17 RX ORDER — FUROSEMIDE 20 MG
20 TABLET ORAL
Qty: 45 TABLET | Refills: 1 | OUTPATIENT
Start: 2024-07-17

## 2024-07-22 ENCOUNTER — TRANSFERRED RECORDS (OUTPATIENT)
Dept: HEALTH INFORMATION MANAGEMENT | Facility: CLINIC | Age: 89
End: 2024-07-22
Payer: COMMERCIAL

## 2024-07-30 DIAGNOSIS — M81.0 OSTEOPOROSIS WITHOUT CURRENT PATHOLOGICAL FRACTURE, UNSPECIFIED OSTEOPOROSIS TYPE: ICD-10-CM

## 2024-07-30 DIAGNOSIS — F32.A DEPRESSION, UNSPECIFIED DEPRESSION TYPE: ICD-10-CM

## 2024-07-30 DIAGNOSIS — I10 HYPERTENSION, UNSPECIFIED TYPE: ICD-10-CM

## 2024-07-30 RX ORDER — ASPIRIN 81 MG/1
TABLET ORAL
Qty: 90 TABLET | Refills: 2 | Status: SHIPPED | OUTPATIENT
Start: 2024-07-30

## 2024-08-05 ENCOUNTER — PATIENT OUTREACH (OUTPATIENT)
Dept: GERIATRIC MEDICINE | Facility: CLINIC | Age: 89
End: 2024-08-05
Payer: COMMERCIAL

## 2024-08-05 NOTE — PROGRESS NOTES
Received call from son in-law requesting a purwik Urine Collectin System - female external catheter ([email or web address] for at night. Son in-law reports that Aracelis is going through 10-12 pull-ups per night and sleep is very disrupted from incontinent episodes. She used the purwik system while in the hospital and it worked well for her per family.     Right now purwick systems are not eligible for medicare or medicaid payment because there isn't enough literature supporting the use yet and because they are expensive. [facility]'s health plan [employer] only approves ordering purwick systems under elderly waiver case by case basis. [employer] requires physician documentation supporting the need for the purwick system. Supporting documentation would need to include immobility, high falls risk, promote sleep and safety at night. Aracelis or her caregiver would need to be able to manage the device independently.     If the purwick system is not support by the PCP or approved by [employer], Aracelis will need a significant increase in incontinent products order monthly which will require a new physicians order.    I approved and documentation is received from PCP, two bids from separate DME providers are required and IDT will need to review for approval or denial of having auth submitted to [employer].    Flora Luis, MARTA  [facility]  [phone]  Fax: [phone]

## 2024-08-06 ENCOUNTER — OFFICE VISIT (OUTPATIENT)
Dept: FAMILY MEDICINE | Facility: CLINIC | Age: 89
End: 2024-08-06
Payer: COMMERCIAL

## 2024-08-06 VITALS
RESPIRATION RATE: 14 BRPM | OXYGEN SATURATION: 96 % | HEIGHT: 60 IN | WEIGHT: 106 LBS | SYSTOLIC BLOOD PRESSURE: 117 MMHG | DIASTOLIC BLOOD PRESSURE: 74 MMHG | HEART RATE: 105 BPM | BODY MASS INDEX: 20.81 KG/M2

## 2024-08-06 DIAGNOSIS — I50.9 ACUTE ON CHRONIC CONGESTIVE HEART FAILURE, UNSPECIFIED HEART FAILURE TYPE (H): Primary | ICD-10-CM

## 2024-08-06 DIAGNOSIS — N39.42 URINARY INCONTINENCE WITHOUT SENSORY AWARENESS: ICD-10-CM

## 2024-08-06 DIAGNOSIS — R63.0 ANOREXIA: ICD-10-CM

## 2024-08-06 DIAGNOSIS — K59.00 CONSTIPATION, UNSPECIFIED CONSTIPATION TYPE: ICD-10-CM

## 2024-08-06 DIAGNOSIS — F32.A DEPRESSION, UNSPECIFIED DEPRESSION TYPE: ICD-10-CM

## 2024-08-06 DIAGNOSIS — N30.01 ACUTE CYSTITIS WITH HEMATURIA: ICD-10-CM

## 2024-08-06 DIAGNOSIS — Z74.09 IMPAIRED MOBILITY AND ACTIVITIES OF DAILY LIVING: ICD-10-CM

## 2024-08-06 DIAGNOSIS — Z78.9 IMPAIRED MOBILITY AND ACTIVITIES OF DAILY LIVING: ICD-10-CM

## 2024-08-06 DIAGNOSIS — S32.040D CLOSED COMPRESSION FRACTURE OF L4 LUMBAR VERTEBRA WITH ROUTINE HEALING, SUBSEQUENT ENCOUNTER: Chronic | ICD-10-CM

## 2024-08-06 LAB
ALBUMIN UR-MCNC: 100 MG/DL
APPEARANCE UR: CLEAR
BACTERIA #/AREA URNS HPF: ABNORMAL /HPF
BILIRUB UR QL STRIP: NEGATIVE
COLOR UR AUTO: YELLOW
GLUCOSE UR STRIP-MCNC: NEGATIVE MG/DL
HGB UR QL STRIP: ABNORMAL
KETONES UR STRIP-MCNC: ABNORMAL MG/DL
LEUKOCYTE ESTERASE UR QL STRIP: ABNORMAL
NITRATE UR QL: POSITIVE
PH UR STRIP: 7 [PH] (ref 5–7)
RBC #/AREA URNS AUTO: ABNORMAL /HPF
SP GR UR STRIP: 1.02 (ref 1–1.03)
SQUAMOUS #/AREA URNS AUTO: ABNORMAL /LPF
UROBILINOGEN UR STRIP-ACNC: 1 E.U./DL
WBC #/AREA URNS AUTO: >100 /HPF
WBC CLUMPS #/AREA URNS HPF: PRESENT /HPF

## 2024-08-06 PROCEDURE — 81001 URINALYSIS AUTO W/SCOPE: CPT | Performed by: PHYSICIAN ASSISTANT

## 2024-08-06 PROCEDURE — 87186 SC STD MICRODIL/AGAR DIL: CPT | Performed by: PHYSICIAN ASSISTANT

## 2024-08-06 PROCEDURE — 99214 OFFICE O/P EST MOD 30 MIN: CPT | Performed by: PHYSICIAN ASSISTANT

## 2024-08-06 PROCEDURE — G2211 COMPLEX E/M VISIT ADD ON: HCPCS | Performed by: PHYSICIAN ASSISTANT

## 2024-08-06 PROCEDURE — 87088 URINE BACTERIA CULTURE: CPT | Performed by: PHYSICIAN ASSISTANT

## 2024-08-06 PROCEDURE — 87086 URINE CULTURE/COLONY COUNT: CPT | Performed by: PHYSICIAN ASSISTANT

## 2024-08-06 RX ORDER — AMOXICILLIN 250 MG
1 CAPSULE ORAL 2 TIMES DAILY PRN
Qty: 60 TABLET | Refills: 1 | Status: SHIPPED | OUTPATIENT
Start: 2024-08-06 | End: 2024-10-07

## 2024-08-06 RX ORDER — BISACODYL 10 MG
10 SUPPOSITORY, RECTAL RECTAL DAILY PRN
Qty: 10 SUPPOSITORY | Refills: 1 | Status: SHIPPED | OUTPATIENT
Start: 2024-08-06

## 2024-08-06 RX ORDER — SERTRALINE HYDROCHLORIDE 100 MG/1
100 TABLET, FILM COATED ORAL DAILY
Qty: 90 TABLET | Refills: 1 | Status: SHIPPED | OUTPATIENT
Start: 2024-08-06

## 2024-08-06 RX ORDER — LOTEPREDNOL ETABONATE 2 MG/ML
SUSPENSION/ DROPS OPHTHALMIC
COMMUNITY
Start: 2024-05-24

## 2024-08-06 ASSESSMENT — PAIN SCALES - GENERAL: PAINLEVEL: NO PAIN (0)

## 2024-08-06 ASSESSMENT — ENCOUNTER SYMPTOMS: FATIGUE: 1

## 2024-08-06 NOTE — PROGRESS NOTES
Assessment & Plan     Anorexia  Depression, unspecified depression type  Appears as though she is on the preferred sertraline.  Recommend restarting at 50 mg and can increase to 100 mg to hopefully help her mood and appetite.  Mood definitely seems depressed today in comparison to her usual self.  Family is agreeable to plan.  Could consider addition of Remeron in the future.  Provided them with samples of Ensure.  Encourage eating.  Close monitoring  - sertraline (ZOLOFT) 100 MG tablet  Dispense: 90 tablet; Refill: 1    Urinary incontinence without sensory awareness  Repeat urinalysis.  Ordered additional pullups.   - Incontinence Supplies Order for DME - ONLY FOR DME  - UA Macroscopic with reflex to Microscopic and Culture - Lab Collect  - UA Macroscopic with reflex to Microscopic and Culture - Lab Collect  - Urine Microscopic Exam  - Urine Culture    Impaired mobility and activities of daily living  New walker orders  - Walker Order for DME - ONLY FOR DME    Constipation, unspecified constipation type  Refilled medications  - bisacodyl (DULCOLAX) 10 MG suppository  Dispense: 10 suppository; Refill: 1  - senna-docusate (SENOKOT-S/PERICOLACE) 8.6-50 MG tablet  Dispense: 60 tablet; Refill: 1    Closed compression fracture of L4 lumbar vertebra with routine healing, subsequent encounter  Resolved.  Follow-up with [facility] as needed.    Acute on chronic congestive heart failure, unspecified heart failure type (H)  Euvolemic today.  Blood pressure well-controlled.      30 minutes spent by me on the date of the encounter doing chart review, review of outside records, review of test results, interpretation of tests, patient visit, documentation, and discussion with family    The longitudinal plan of care for the diagnosis(es)/condition(s) as documented were addressed during this visit. Due to the added complexity in care, I will continue to support Aracelis in the subsequent management and with ongoing continuity of care.      Janice Hsu is a 89 year old, presenting for the following health issues:  Fatigue, Anorexia, Knee Pain (Bilateral), and Recheck Medication (Discuss multiple meds, and a new walker)    Here today companied by her daughter and son-in-law for follow-up.  Since last visit has discontinued home care as she is doing better.    -Requesting a few different medication refills including her senna/Dulcolax.   -Requesting a new 4 wheeled walker.  Her current one is approximately 4 years old.  -Ongoing issues with urinary incontinence especially at nighttime.  Has been treated recently for urinary tract infection.  Discussed pure wick which her nursing team did reach out to me in regards to.  They are not interested in this at this time.    -Weight is down.  Less interest in eating.  Resolution of dysphagia.  No nausea or vomiting.  Denies abdominal pain.    -Did see [facility] for her back issues which thankfully with a cortisone injection she has seen improvement in her symptoms.    History of Present Illness       Reason for visit:  Multiple concerns    She eats 0-1 servings of fruits and vegetables daily.She consumes 0 sweetened beverage(s) daily.She exercises with enough effort to increase her heart rate 10 to 19 minutes per day.  She exercises with enough effort to increase her heart rate 7 days per week.   She is taking medications regularly.       Review of Systems  Constitutional, neuro, ENT, endocrine, pulmonary, cardiac, gastrointestinal, genitourinary, musculoskeletal, integument and psychiatric systems are negative, except as otherwise noted.      Objective    /74 (BP Location: Right arm, Patient Position: Sitting, Cuff Size: Adult Small)   Pulse 105   Resp 14   Ht 1.524 m (5')   Wt 48.1 kg (106 lb)   LMP  (LMP Unknown)   SpO2 96%   Breastfeeding No   BMI 20.70 kg/m    Body mass index is 20.7 kg/m .  Physical Exam   GENERAL: alert and no distress  EYES: Eyes grossly normal to inspection, PERRL  and conjunctivae and sclerae normal  NECK: no adenopathy, no asymmetry, masses, or scars  RESP: lungs clear to auscultation - no rales, rhonchi or wheezes  CV: regular rate and rhythm, normal S1 S2, no S3 or S4, no murmur, click or rub, no peripheral edema  ABDOMEN: soft, nontender, no hepatosplenomegaly, no masses   MS: no gross musculoskeletal defects noted, no edema  SKIN: no suspicious lesions or rashes  NEURO: Normal strength and tone, mentation intact and speech normal  PSYCH: mentation appears normal, affect normal/bright    The likelihood of other entities in the differential is insufficient to justify any further testing for them at this time. This was explained to the patient. The patient was advised that persistent or worsening symptoms would require further evaluation. Patient advised to call the office and if unable to reach to go to the emergency room if they develop any new or worsening symptoms. Expressed understanding and agreement with above stated plan.        Signed Electronically by: Ervin Ledesma PA-C

## 2024-08-07 ENCOUNTER — TELEPHONE (OUTPATIENT)
Dept: FAMILY MEDICINE | Facility: CLINIC | Age: 89
End: 2024-08-07
Payer: COMMERCIAL

## 2024-08-07 DIAGNOSIS — S32.040D CLOSED COMPRESSION FRACTURE OF L4 LUMBAR VERTEBRA WITH ROUTINE HEALING, SUBSEQUENT ENCOUNTER: ICD-10-CM

## 2024-08-07 DIAGNOSIS — N30.01 ACUTE CYSTITIS WITH HEMATURIA: Primary | ICD-10-CM

## 2024-08-07 LAB — BACTERIA UR CULT: ABNORMAL

## 2024-08-07 RX ORDER — SULFAMETHOXAZOLE/TRIMETHOPRIM 800-160 MG
1 TABLET ORAL 2 TIMES DAILY
Qty: 10 TABLET | Refills: 0 | Status: SHIPPED | OUTPATIENT
Start: 2024-08-07 | End: 2024-08-12

## 2024-08-08 NOTE — RESULT ENCOUNTER NOTE
Kirt Hsu and family,    I sent over the correct antibiotic for her to her pharmacy.  Keep me posted on her symptoms    Let me know if you have any questions or concerns,     Ervin Ledesma PA-C  [facility]

## 2024-08-09 ENCOUNTER — DOCUMENTATION ONLY (OUTPATIENT)
Dept: FAMILY MEDICINE | Facility: CLINIC | Age: 89
End: 2024-08-09
Payer: COMMERCIAL

## 2024-08-09 DIAGNOSIS — N39.42 URINARY INCONTINENCE WITHOUT SENSORY AWARENESS: Primary | ICD-10-CM

## 2024-08-13 NOTE — PROGRESS NOTES
Per PCP discussion during appt on 8/6/24, family is no longer interested in pursing this option. Refer to PCP visit for more information.    MARTA Lino  [facility]  [phone]  Fax: [phone]

## 2024-08-29 ENCOUNTER — TELEPHONE (OUTPATIENT)
Dept: FAMILY MEDICINE | Facility: CLINIC | Age: 89
End: 2024-08-29
Payer: COMMERCIAL

## 2024-08-29 DIAGNOSIS — N39.42 URINARY INCONTINENCE WITHOUT SENSORY AWARENESS: Primary | ICD-10-CM

## 2024-08-29 NOTE — TELEPHONE ENCOUNTER
Ervin Ledesma PA-C [facility] hour ago (11:03 AM)     MG  All signed! Thank you     Called and spoke with pts daughter Yoselin. Relayed message from above. Provided her with [facility] home [facility] #. Pts daughter is appreciative.

## 2024-08-29 NOTE — TELEPHONE ENCOUNTER
Corbin, patient's son in law (C2C) calling on behalf of the patient requesting new order for DME for incontinence supplies. States patient is currently running out of the underpads for the bed, does not need any pull ups.    Pended updated order as requested by family, routing to PCP to please review and sign order if appropriate - please route back to triage to follow up with family - thank you!     Callback to daughter Yoselin (C2C) to notify once new order is placed [phone] - ok to leave detailed VM     Rocio BEAL  [facility]

## 2024-09-17 ENCOUNTER — OFFICE VISIT (OUTPATIENT)
Dept: FAMILY MEDICINE | Facility: CLINIC | Age: 89
End: 2024-09-17
Payer: COMMERCIAL

## 2024-09-17 VITALS
SYSTOLIC BLOOD PRESSURE: 128 MMHG | HEART RATE: 97 BPM | WEIGHT: 122 LBS | HEIGHT: 60 IN | TEMPERATURE: 97.7 F | BODY MASS INDEX: 23.95 KG/M2 | OXYGEN SATURATION: 91 % | RESPIRATION RATE: 16 BRPM | DIASTOLIC BLOOD PRESSURE: 75 MMHG

## 2024-09-17 DIAGNOSIS — I50.9 ACUTE ON CHRONIC CONGESTIVE HEART FAILURE, UNSPECIFIED HEART FAILURE TYPE (H): Primary | ICD-10-CM

## 2024-09-17 DIAGNOSIS — F32.A DEPRESSION, UNSPECIFIED DEPRESSION TYPE: ICD-10-CM

## 2024-09-17 DIAGNOSIS — Z23 NEED FOR INFLUENZA VACCINATION: ICD-10-CM

## 2024-09-17 DIAGNOSIS — Z78.9 IMPAIRED MOBILITY AND ACTIVITIES OF DAILY LIVING: ICD-10-CM

## 2024-09-17 DIAGNOSIS — Z74.09 IMPAIRED MOBILITY AND ACTIVITIES OF DAILY LIVING: ICD-10-CM

## 2024-09-17 DIAGNOSIS — K59.00 CONSTIPATION, UNSPECIFIED CONSTIPATION TYPE: ICD-10-CM

## 2024-09-17 DIAGNOSIS — R35.0 URINARY FREQUENCY: ICD-10-CM

## 2024-09-17 LAB
ALBUMIN UR-MCNC: NEGATIVE MG/DL
APPEARANCE UR: CLEAR
BACTERIA #/AREA URNS HPF: ABNORMAL /HPF
BILIRUB UR QL STRIP: NEGATIVE
COLOR UR AUTO: YELLOW
GLUCOSE UR STRIP-MCNC: NEGATIVE MG/DL
HGB UR QL STRIP: ABNORMAL
KETONES UR STRIP-MCNC: NEGATIVE MG/DL
LEUKOCYTE ESTERASE UR QL STRIP: ABNORMAL
NITRATE UR QL: POSITIVE
PH UR STRIP: 6 [PH] (ref 5–7)
RBC #/AREA URNS AUTO: ABNORMAL /HPF
SP GR UR STRIP: 1.02 (ref 1–1.03)
SQUAMOUS #/AREA URNS AUTO: ABNORMAL /LPF
UROBILINOGEN UR STRIP-ACNC: 0.2 E.U./DL
WBC #/AREA URNS AUTO: >100 /HPF
WBC CLUMPS #/AREA URNS HPF: PRESENT /HPF

## 2024-09-17 PROCEDURE — G2211 COMPLEX E/M VISIT ADD ON: HCPCS | Performed by: PHYSICIAN ASSISTANT

## 2024-09-17 PROCEDURE — 81001 URINALYSIS AUTO W/SCOPE: CPT | Performed by: PHYSICIAN ASSISTANT

## 2024-09-17 PROCEDURE — 87086 URINE CULTURE/COLONY COUNT: CPT | Performed by: PHYSICIAN ASSISTANT

## 2024-09-17 PROCEDURE — G0008 ADMIN INFLUENZA VIRUS VAC: HCPCS | Performed by: PHYSICIAN ASSISTANT

## 2024-09-17 PROCEDURE — 90662 IIV NO PRSV INCREASED AG IM: CPT | Performed by: PHYSICIAN ASSISTANT

## 2024-09-17 PROCEDURE — 99214 OFFICE O/P EST MOD 30 MIN: CPT | Performed by: PHYSICIAN ASSISTANT

## 2024-09-17 ASSESSMENT — PAIN SCALES - GENERAL: PAINLEVEL: NO PAIN (0)

## 2024-09-17 NOTE — PROGRESS NOTES
Assessment & Plan     Depression, unspecified depression type  Significant improvement.  Continue sertraline 100 mg.     Acute on chronic congestive heart failure, unspecified heart failure type (H)  Stable.  Plan for follow-up in 3 months.    Need for influenza vaccination  - INFLUENZA HIGH DOSE, TRIVALENT, PF (FLUZONE)    Impaired mobility and activities of daily living  Fall prevention.  Home safety reviewed.  Walker/wheelchair.    Constipation, unspecified constipation type  Continue medications as needed.    Urinary frequency  Check urinalysis  - UA Macroscopic with reflex to Microscopic and Culture      20 minutes spent by me on the date of the encounter doing chart review, review of test results, interpretation of tests, patient visit, and documentation    The longitudinal plan of care for the diagnosis(es)/condition(s) as documented were addressed during this visit. Due to the added complexity in care, I will continue to support Aracelis in the subsequent management and with ongoing continuity of care.     Janice Hsu is a 89 year old, presenting for the following health issues:  UTI and Follow Up    Here today accompanied by her daughter for routine follow-up.    Since her last visit 6 weeks ago she has made significant improvement.  Has restarted sertraline, now taking 100 mg daily.  Significant proving in her daily mood, energy levels, as well as appetite.  Weight improved, approximately 15 lbs. Denies shortness of breath, chest pain, new leg swelling.    -Receiving great support and care from family at home.    -Ongoing urinary frequency.  Requesting testing today for infection.      History of Present Illness       Reason for visit:  Check   She is taking medications regularly.         Review of Systems  Constitutional, neuro, ENT, endocrine, pulmonary, cardiac, gastrointestinal, genitourinary, musculoskeletal, integument and psychiatric systems are negative, except as otherwise noted.       Objective    /75   Pulse 97   Temp 97.7  F (36.5  C) (Oral)   Resp 16   Ht 1.524 m (5')   Wt 55.3 kg (122 lb)   LMP  (LMP Unknown)   SpO2 91%   Breastfeeding No   BMI 23.83 kg/m    Body mass index is 23.83 kg/m .  Physical Exam   GENERAL: alert and no distress  EYES: Eyes grossly normal to inspection, PERRL and conjunctivae and sclerae normal  NECK: no adenopathy, no asymmetry, masses, or scars  RESP: lungs clear to auscultation - no rales, rhonchi or wheezes  CV: regular rate and rhythm, normal S1 S2, no S3 or S4, no murmur, click or rub, no peripheral edema  ABDOMEN: soft, nontender  MS: no gross musculoskeletal defects noted, no edema  SKIN: no suspicious lesions or rashes  NEURO: Normal strength and tone, mentation intact and speech normal  PSYCH: mentation appears normal, affect normal/bright    The likelihood of other entities in the differential is insufficient to justify any further testing for them at this time. This was explained to the patient. The patient was advised that persistent or worsening symptoms would require further evaluation. Patient advised to call the office and if unable to reach to go to the emergency room if they develop any new or worsening symptoms. Expressed understanding and agreement with above stated plan.     Signed Electronically by: Ervin Ledesma PA-C

## 2024-09-18 ENCOUNTER — TELEPHONE (OUTPATIENT)
Dept: FAMILY MEDICINE | Facility: CLINIC | Age: 89
End: 2024-09-18
Payer: COMMERCIAL

## 2024-09-18 DIAGNOSIS — N39.0 FREQUENT UTI: Primary | ICD-10-CM

## 2024-09-18 DIAGNOSIS — Z87.440 PERSONAL HISTORY OF URINARY TRACT INFECTION: ICD-10-CM

## 2024-09-18 LAB — BACTERIA UR CULT: NORMAL

## 2024-09-18 NOTE — RESULT ENCOUNTER NOTE
Can we contact family and let her know that her urine test additionally showed some bacteria however her urine culture was negative.  No need to start an antibiotic at this time.

## 2024-09-18 NOTE — TELEPHONE ENCOUNTER
Patient's son in law called  [facility] verified.   Son in law was informed of message below. He asked if pt should repeat test in 1 to 2 weeks due to her hx of UTI and past complications with UTI's.     Please advice if provider can place future UA/UC lab order?    Daughter Yoselin needs a call back with providers advice.

## 2024-09-18 NOTE — TELEPHONE ENCOUNTER
: Nona    Attempt #1: Left Message    Writer left message for patient requesting that they return call to discuss message below:

## 2024-09-19 NOTE — TELEPHONE ENCOUNTER
Ervin Ledesma PA-C  [facility]2 hours ago (7:44 AM)     MG  Ok to repeat. Signed order

## 2024-09-19 NOTE — TELEPHONE ENCOUNTER
Pts son calling back. Writer gave message below. Son stated understanding. No further questions at this time    Flora Rondon RN on 9/19/2024 at 3:17 PM

## 2024-09-19 NOTE — TELEPHONE ENCOUNTER
Patient Contact    Attempt # 1    Was call answered? No.    Left message for patient to call triage back.  Please advise ok to repeat UA/UC in 1-2 weeks per message below.   Eula Gonsales RN

## 2024-09-28 ENCOUNTER — LAB (OUTPATIENT)
Dept: LAB | Facility: CLINIC | Age: 89
End: 2024-09-28
Payer: COMMERCIAL

## 2024-09-28 DIAGNOSIS — Z87.440 PERSONAL HISTORY OF URINARY TRACT INFECTION: ICD-10-CM

## 2024-09-28 DIAGNOSIS — I50.9 ACUTE ON CHRONIC CONGESTIVE HEART FAILURE, UNSPECIFIED HEART FAILURE TYPE (H): Primary | ICD-10-CM

## 2024-09-28 LAB
ALBUMIN UR-MCNC: 30 MG/DL
APPEARANCE UR: ABNORMAL
BACTERIA #/AREA URNS HPF: ABNORMAL /HPF
BILIRUB UR QL STRIP: NEGATIVE
CAOX CRY #/AREA URNS HPF: ABNORMAL /HPF
COLOR UR AUTO: YELLOW
GLUCOSE UR STRIP-MCNC: NEGATIVE MG/DL
HGB UR QL STRIP: ABNORMAL
KETONES UR STRIP-MCNC: 15 MG/DL
LEUKOCYTE ESTERASE UR QL STRIP: ABNORMAL
NITRATE UR QL: POSITIVE
PH UR STRIP: 6 [PH] (ref 5–7)
RBC #/AREA URNS AUTO: ABNORMAL /HPF
SP GR UR STRIP: 1.02 (ref 1–1.03)
SQUAMOUS #/AREA URNS AUTO: ABNORMAL /LPF
UROBILINOGEN UR STRIP-ACNC: 0.2 E.U./DL
WBC #/AREA URNS AUTO: >100 /HPF

## 2024-09-28 PROCEDURE — 87088 URINE BACTERIA CULTURE: CPT

## 2024-09-28 PROCEDURE — 87086 URINE CULTURE/COLONY COUNT: CPT

## 2024-09-28 PROCEDURE — 81001 URINALYSIS AUTO W/SCOPE: CPT

## 2024-09-28 PROCEDURE — 87186 SC STD MICRODIL/AGAR DIL: CPT

## 2024-09-29 LAB — BACTERIA UR CULT: ABNORMAL

## 2024-09-30 RX ORDER — SULFAMETHOXAZOLE/TRIMETHOPRIM 800-160 MG
1 TABLET ORAL 2 TIMES DAILY
Qty: 14 TABLET | Refills: 0 | Status: SHIPPED | OUTPATIENT
Start: 2024-09-30 | End: 2024-10-07

## 2024-10-07 DIAGNOSIS — I50.9 ACUTE ON CHRONIC CONGESTIVE HEART FAILURE, UNSPECIFIED HEART FAILURE TYPE (H): ICD-10-CM

## 2024-10-07 DIAGNOSIS — K59.00 CONSTIPATION, UNSPECIFIED CONSTIPATION TYPE: ICD-10-CM

## 2024-10-07 DIAGNOSIS — N30.01 ACUTE CYSTITIS WITH HEMATURIA: ICD-10-CM

## 2024-10-07 RX ORDER — DOCUSATE SODIUM 50MG AND SENNOSIDES 8.6MG 8.6; 5 MG/1; MG/1
1 TABLET, FILM COATED ORAL 2 TIMES DAILY PRN
Qty: 60 TABLET | Refills: 1 | Status: SHIPPED | OUTPATIENT
Start: 2024-10-07

## 2024-10-07 NOTE — TELEPHONE ENCOUNTER
Prescription approved per [facility] Refill Protocol.  Pamela Uriarte RN  [facility]

## 2024-11-18 ENCOUNTER — TELEPHONE (OUTPATIENT)
Dept: FAMILY MEDICINE | Facility: CLINIC | Age: 89
End: 2024-11-18
Payer: COMMERCIAL

## 2024-11-18 NOTE — TELEPHONE ENCOUNTER
Forms/Letter Request    Type of form/letter: DMV/Handicap Parking      Is Release of Information needed?: No    Do we have the form/letter: Yes:     Who is the form from?  DMV    Where did/will the form come from? Patient or family brought in       When is form/letter needed by: ASAP--current one is expiring    How would you like the form/letter returned:     Patient Notified form requests are processed in 5-7 business days:Yes    Could we send this information to you in Food and Beverage or would you prefer to receive a phone call?:   Patient would prefer a phone call     Okay to leave a detailed message?: Yes at Cell number on file:    Telephone Information:   Mobile [phone]

## 2024-11-25 ENCOUNTER — PATIENT OUTREACH (OUTPATIENT)
Dept: GERIATRIC MEDICINE | Facility: CLINIC | Age: 89
End: 2024-11-25
Payer: COMMERCIAL

## 2024-11-25 NOTE — PROGRESS NOTES
[facility] Care Coordination Contact    Called member to schedule annual HRA home visit. HRA has been scheduled for Thursday, December 5th.  Called Ana Laura Ortega and scheduled an  for the home visit. Requested Blanca per family request.    MARTA Lino  [facility]  [phone]  Fax: [phone]

## 2024-12-04 DIAGNOSIS — F32.A DEPRESSION, UNSPECIFIED DEPRESSION TYPE: ICD-10-CM

## 2024-12-08 DIAGNOSIS — S32.040D CLOSED COMPRESSION FRACTURE OF L4 LUMBAR VERTEBRA WITH ROUTINE HEALING, SUBSEQUENT ENCOUNTER: ICD-10-CM

## 2024-12-09 ENCOUNTER — PATIENT OUTREACH (OUTPATIENT)
Dept: GERIATRIC MEDICINE | Facility: CLINIC | Age: 89
End: 2024-12-09
Payer: COMMERCIAL

## 2024-12-09 RX ORDER — CARBOXYMETHYLCELLULOSE SODIUM 5 MG/ML
1 SOLUTION/ DROPS OPHTHALMIC 2 TIMES DAILY PRN
COMMUNITY

## 2024-12-09 ASSESSMENT — LIFESTYLE VARIABLES
HOW MANY STANDARD DRINKS CONTAINING ALCOHOL DO YOU HAVE ON A TYPICAL DAY: PATIENT DOES NOT DRINK
HOW OFTEN DO YOU HAVE A DRINK CONTAINING ALCOHOL: NEVER
AUDIT-C TOTAL SCORE: 0
HOW OFTEN DO YOU HAVE SIX OR MORE DRINKS ON ONE OCCASION: NEVER
SKIP TO QUESTIONS 9-10: 1

## 2024-12-09 ASSESSMENT — SOCIAL DETERMINANTS OF HEALTH (SDOH)
HOW OFTEN DO YOU GET TOGETHER WITH FRIENDS OR RELATIVES?: MORE THAN THREE TIMES A WEEK
IN A TYPICAL WEEK, HOW MANY TIMES DO YOU TALK ON THE PHONE WITH FAMILY, FRIENDS, OR NEIGHBORS?: MORE THAN THREE TIMES A WEEK

## 2024-12-09 ASSESSMENT — ACTIVITIES OF DAILY LIVING (ADL)
DEPENDENT_IADLS:: CLEANING;COOKING;LAUNDRY;SHOPPING;MEAL PREPARATION;MEDICATION MANAGEMENT;TRANSPORTATION;MONEY MANAGEMENT;INCONTINENCE

## 2024-12-09 NOTE — PROGRESS NOTES
"[facility] Care Coordination Contact    [facility] Home Visit Assessment     Home visit for Health Risk Assessment with Aracelis Blakely completed on December 9, 2024       Current living arrangement:: I live alone     Assessment completed with:: Patient, Children, Other (Farsi )    Current Care Plan  Member currently receiving the following home care services:     Member currently receiving the following community resources: [facility] Work[facility], [facility] Programs, Transportation Services, PCA    Medication Review  Medication reconciliation completed in Epic: Yes  Medication set-up & administration: Family/informal caregiver sets up weekly.  Family caregiver administers medications.  Medication Risk Assessment Medication (1 or more, place referral to MTM): N/A: No risk factors identified  MTM Referral Placed: No: No risk factors idenified    Mental/Behavioral Health   Depression Screening:           Mental health DX:: Yes   Mental health DX how managed:: Medication    Falls Assessment:   Fallen 2 or more times in the past year?: No   Any fall with injury in the past year?: No    ADL/IADL Dependencies:   Dependent ADLs:: Ambulation-cane, Bathing, Dressing, Eating, Grooming, Incontinence, Transfers, Toileting  Dependent IADLs:: Cleaning, Cooking, Laundry, Shopping, Meal Preparation, Medication Management, Transportation, Money Management, Incontinence    Health Plan sponsored benefits: [employer]: Shared information regarding [employer] Program. Reviewed preventative health screening and health plan supplemental benefits/incentives. Reviewed medication disposal form.    PCA Assessment completed at visit: Yes, hours pending.    Elderly Waiver Eligibility: Yes-will continue on EW    Care Plan & Recommendations: Pain has improved. Mobility has improved. Daughter reports Aracelis has improved since returning home from the [facility] and has been giving a \"new life.\" Still requires assistance w/ ADL's " and IADL's. Family involved and cares for Aracelis. Daughter will call once she makes a decision about which [employer] Consultation Provider to use. Will f/u in two weeks if CC hasn't received a call.    See [place] for detailed assessment information.    Follow-Up Plan: Member informed of future contact, plan to f/u with member with a 6 month telephone assessment.  Contact information shared with member and family, encouraged member to call with any questions or concerns at any time.    [place] care continuum providers: Please see Snapshot and Care Management Flowsheets for Specific details of care plan.    This CC note routed to PCP, Ervin Ledesma.    Flora Luis, MARÍA  [facility]  [phone]  Fax: [phone]

## 2024-12-09 NOTE — Clinical Note
"Dr. Ledesma,  I am the [facility] care coordinator for Aracelis Blakely I saw Aracelis for an in-person annual visit at her home w/ her daughter Yoselin present. Completed a medication reconciliation w/ daughter and Aracelis. Ubaldoileh/dghtr reported changes in several medications as directed by a physician. Also, discontinued a few OTC meds. Meds that Jamileh/dghtr reported not taking are flagged for removal. Pain has improved. Mobility has improved. Daughter reports Aracelis has improved overall since returning home from the [facility] this past year and has been given a \"new life.\" Still requires assistance w/ ADL's and IADL's. Services in the home will not be decreased.  All of my documentation can be found in [facility]. Please do not hesitate to contact me with any questions or concerns.  Flora Luis, [facility] [phone] Fax: [phone]   "

## 2024-12-17 ENCOUNTER — OFFICE VISIT (OUTPATIENT)
Dept: FAMILY MEDICINE | Facility: CLINIC | Age: 89
End: 2024-12-17
Payer: COMMERCIAL

## 2024-12-17 ENCOUNTER — PATIENT OUTREACH (OUTPATIENT)
Dept: GERIATRIC MEDICINE | Facility: CLINIC | Age: 89
End: 2024-12-17

## 2024-12-17 VITALS
HEIGHT: 58 IN | BODY MASS INDEX: 25.67 KG/M2 | RESPIRATION RATE: 18 BRPM | WEIGHT: 122.3 LBS | TEMPERATURE: 96.8 F | SYSTOLIC BLOOD PRESSURE: 122 MMHG | OXYGEN SATURATION: 93 % | HEART RATE: 97 BPM | DIASTOLIC BLOOD PRESSURE: 75 MMHG

## 2024-12-17 DIAGNOSIS — R73.09 ELEVATED GLUCOSE: ICD-10-CM

## 2024-12-17 DIAGNOSIS — M81.0 OSTEOPOROSIS WITHOUT CURRENT PATHOLOGICAL FRACTURE, UNSPECIFIED OSTEOPOROSIS TYPE: Chronic | ICD-10-CM

## 2024-12-17 DIAGNOSIS — Z29.11 NEED FOR VACCINATION AGAINST RESPIRATORY SYNCYTIAL VIRUS: ICD-10-CM

## 2024-12-17 DIAGNOSIS — Z23 ENCOUNTER FOR IMMUNIZATION: ICD-10-CM

## 2024-12-17 DIAGNOSIS — Z00.00 ENCOUNTER FOR ANNUAL WELLNESS EXAM IN MEDICARE PATIENT: Primary | ICD-10-CM

## 2024-12-17 DIAGNOSIS — I10 HYPERTENSION, UNSPECIFIED TYPE: ICD-10-CM

## 2024-12-17 DIAGNOSIS — F32.A DEPRESSION, UNSPECIFIED DEPRESSION TYPE: ICD-10-CM

## 2024-12-17 DIAGNOSIS — N39.0 RECURRENT UTI: ICD-10-CM

## 2024-12-17 DIAGNOSIS — K59.00 CONSTIPATION, UNSPECIFIED CONSTIPATION TYPE: ICD-10-CM

## 2024-12-17 PROBLEM — I50.9 ACUTE ON CHRONIC CONGESTIVE HEART FAILURE, UNSPECIFIED HEART FAILURE TYPE (H): Status: RESOLVED | Noted: 2024-06-05 | Resolved: 2024-12-17

## 2024-12-17 LAB
ALBUMIN SERPL BCG-MCNC: 3.7 G/DL (ref 3.5–5.2)
ALBUMIN UR-MCNC: ABNORMAL MG/DL
ALP SERPL-CCNC: 63 U/L (ref 40–150)
ALT SERPL W P-5'-P-CCNC: 7 U/L (ref 0–50)
ANION GAP SERPL CALCULATED.3IONS-SCNC: 9 MMOL/L (ref 7–15)
APPEARANCE UR: CLEAR
AST SERPL W P-5'-P-CCNC: 17 U/L (ref 0–45)
BACTERIA #/AREA URNS HPF: ABNORMAL /HPF
BASOPHILS # BLD AUTO: 0 10E3/UL (ref 0–0.2)
BASOPHILS NFR BLD AUTO: 0 %
BILIRUB SERPL-MCNC: 0.7 MG/DL
BILIRUB UR QL STRIP: NEGATIVE
BUN SERPL-MCNC: 21.1 MG/DL (ref 8–23)
CALCIUM SERPL-MCNC: 9.5 MG/DL (ref 8.8–10.4)
CHLORIDE SERPL-SCNC: 103 MMOL/L (ref 98–107)
COLOR UR AUTO: YELLOW
CREAT SERPL-MCNC: 0.66 MG/DL (ref 0.51–0.95)
EGFRCR SERPLBLD CKD-EPI 2021: 77 ML/MIN/1.73M2
EOSINOPHIL # BLD AUTO: 0.1 10E3/UL (ref 0–0.7)
EOSINOPHIL NFR BLD AUTO: 2 %
ERYTHROCYTE [DISTWIDTH] IN BLOOD BY AUTOMATED COUNT: 12.3 % (ref 10–15)
EST. AVERAGE GLUCOSE BLD GHB EST-MCNC: 88 MG/DL
GLUCOSE SERPL-MCNC: 129 MG/DL (ref 70–99)
GLUCOSE UR STRIP-MCNC: NEGATIVE MG/DL
HBA1C MFR BLD: 4.7 % (ref 0–5.6)
HCO3 SERPL-SCNC: 28 MMOL/L (ref 22–29)
HCT VFR BLD AUTO: 40 % (ref 35–47)
HGB BLD-MCNC: 12.5 G/DL (ref 11.7–15.7)
HGB UR QL STRIP: NEGATIVE
IMM GRANULOCYTES # BLD: 0 10E3/UL
IMM GRANULOCYTES NFR BLD: 0 %
KETONES UR STRIP-MCNC: ABNORMAL MG/DL
LEUKOCYTE ESTERASE UR QL STRIP: NEGATIVE
LYMPHOCYTES # BLD AUTO: 0.8 10E3/UL (ref 0.8–5.3)
LYMPHOCYTES NFR BLD AUTO: 16 %
MCH RBC QN AUTO: 28.3 PG (ref 26.5–33)
MCHC RBC AUTO-ENTMCNC: 31.3 G/DL (ref 31.5–36.5)
MCV RBC AUTO: 91 FL (ref 78–100)
MONOCYTES # BLD AUTO: 0.4 10E3/UL (ref 0–1.3)
MONOCYTES NFR BLD AUTO: 8 %
NEUTROPHILS # BLD AUTO: 3.7 10E3/UL (ref 1.6–8.3)
NEUTROPHILS NFR BLD AUTO: 73 %
NITRATE UR QL: NEGATIVE
PH UR STRIP: 6.5 [PH] (ref 5–7)
PLATELET # BLD AUTO: 186 10E3/UL (ref 150–450)
POTASSIUM SERPL-SCNC: 4 MMOL/L (ref 3.4–5.3)
PROT SERPL-MCNC: 6.6 G/DL (ref 6.4–8.3)
RBC # BLD AUTO: 4.41 10E6/UL (ref 3.8–5.2)
RBC #/AREA URNS AUTO: ABNORMAL /HPF
SODIUM SERPL-SCNC: 140 MMOL/L (ref 135–145)
SP GR UR STRIP: 1.02 (ref 1–1.03)
SQUAMOUS #/AREA URNS AUTO: ABNORMAL /LPF
TSH SERPL DL<=0.005 MIU/L-ACNC: 2.61 UIU/ML (ref 0.3–4.2)
UROBILINOGEN UR STRIP-ACNC: 0.2 E.U./DL
WBC # BLD AUTO: 5.1 10E3/UL (ref 4–11)
WBC #/AREA URNS AUTO: ABNORMAL /HPF

## 2024-12-17 PROCEDURE — 90480 ADMN SARSCOV2 VAC 1/ONLY CMP: CPT | Performed by: PHYSICIAN ASSISTANT

## 2024-12-17 PROCEDURE — 84443 ASSAY THYROID STIM HORMONE: CPT | Performed by: PHYSICIAN ASSISTANT

## 2024-12-17 PROCEDURE — 80053 COMPREHEN METABOLIC PANEL: CPT | Performed by: PHYSICIAN ASSISTANT

## 2024-12-17 PROCEDURE — G0439 PPPS, SUBSEQ VISIT: HCPCS | Performed by: PHYSICIAN ASSISTANT

## 2024-12-17 PROCEDURE — 36415 COLL VENOUS BLD VENIPUNCTURE: CPT | Performed by: PHYSICIAN ASSISTANT

## 2024-12-17 PROCEDURE — 85025 COMPLETE CBC W/AUTO DIFF WBC: CPT | Performed by: PHYSICIAN ASSISTANT

## 2024-12-17 PROCEDURE — 81001 URINALYSIS AUTO W/SCOPE: CPT | Performed by: PHYSICIAN ASSISTANT

## 2024-12-17 PROCEDURE — 91320 SARSCV2 VAC 30MCG TRS-SUC IM: CPT | Performed by: PHYSICIAN ASSISTANT

## 2024-12-17 PROCEDURE — 83036 HEMOGLOBIN GLYCOSYLATED A1C: CPT | Performed by: PHYSICIAN ASSISTANT

## 2024-12-17 SDOH — HEALTH STABILITY: PHYSICAL HEALTH: ON AVERAGE, HOW MANY DAYS PER WEEK DO YOU ENGAGE IN MODERATE TO STRENUOUS EXERCISE (LIKE A BRISK WALK)?: 0 DAYS

## 2024-12-17 ASSESSMENT — SOCIAL DETERMINANTS OF HEALTH (SDOH): HOW OFTEN DO YOU GET TOGETHER WITH FRIENDS OR RELATIVES?: MORE THAN THREE TIMES A WEEK

## 2024-12-17 ASSESSMENT — PAIN SCALES - GENERAL: PAINLEVEL_OUTOF10: NO PAIN (0)

## 2024-12-17 NOTE — LETTER
December 17, 2024    KYLEE DIAZ  [address] [address]      Francesco Hsu,    I hope you ve been well since we last spoke. Attached is your copy of your personalized Support Plan which summarizes our conversation.   My goal is to help you keep your health on track. Your Support Plan includes the steps we agreed would help you with that. We can talk about these steps during our next meeting or sooner if you d like.   Here are additional programs and services I can help you with:    Get to appointments with [id number]    If you need a ride to medical or dental visits, [facility] can help. Call to schedule a ride. 8 [phone] (TTY:[phone]), 8 a.m. - 6 p.m. CT, Monday - Friday.    Access One-Pass to boost your health    Get a gym membership, at-home fitness classes, and free access to fun activities that help your brain. To get access, go to [email or web address] digital or call One-Pass.   3 [phone] (TTY:[phone]), 8 a.m. - 9 p.m. CT, Monday - Friday      Set up your health care directive  A directive is a legal form that explains your health care wishes. You can request this form from me, and I ll walk you through it before you discuss your plans with your doctor.    Schedule your annual physical  I can help set up your annual physical at your clinic of choice. It's an important step towards good health.      Have questions? I m here to help.  Call me at [phone] (TTY:[phone]) 8am - 5pm, Monday - Friday.  I ll reach out to you again in 6 months to follow up via telephone.  Warm regards,    MARTA Lino    E-mail: [email or web address]  Phone: [phone]      [employer]    cc: member records                                                                    [id number]

## 2024-12-17 NOTE — PROGRESS NOTES
[facility] Care Coordination Contact    Received after visit chart from care coordinator.  Completed following tasks: Mailed copy of support plan to member, Mailed [place] signature sheet pages 3-4, Mailed Safe Medication Disposal , Mailed [employer] Leave Behind Letter, and Updated services in Database  , Provider Signature - No Support Plan Shared:  Member indicates that they do not want their support plan shared with any EW providers.    , and [employer]:  Faxed completed PCA assessment to PCA Agency and mailed copies to member.   Emailed referral form for auth to [employer].      Yuliet Dailey  Care Management Specialist  [facility]  [phone]

## 2024-12-17 NOTE — PROGRESS NOTES
"Preventive Care Visit  [facility]  Ervin Ledesma PA-C, Physician Assistant - Medical  Dec 17, 2024      Assessment & Plan     Encounter for annual wellness exam in Medicare patient  She is doing very well. Will be 89 in March.  No safety concerns.  Great support from family.  Annual labs ordered. Healthy diet and exercise reviewed. Recommended routine dental, eye, and skin screenings.  COVID shot today.  Plan for RSV shot in a few weeks at the pharmacy.    - UA Macroscopic with reflex to Microscopic and Culture - Lab Collect  - CBC with platelets and differential  - Comprehensive metabolic panel (BMP + Alb, Alk Phos, ALT, AST, Total. Bili, TP)  - TSH with free T4 reflex  - Hemoglobin A1c      Hypertension, unspecified type  Well-controlled.  Labs today  - CBC with platelets and differential  - Comprehensive metabolic panel (BMP + Alb, Alk Phos, ALT, AST, Total. Bili, TP)  - TSH with free T4 reflex      Depression, unspecified depression type  Continue Zoloft 50 mg    Constipation, unspecified constipation type  Resolved    Osteoporosis without current pathological fracture, unspecified osteoporosis type  Fall prevention/safety.    Need for vaccination against respiratory syncytial virus  Recommended at the pharmacy    Recurrent UTI  Urinalysis today  - UA Macroscopic with reflex to Microscopic and Culture - Lab Collect  - UA Macroscopic with reflex to Microscopic and Culture - Lab Collect  - UA Microscopic with Reflex to Culture    Elevated glucose  - Hemoglobin A1c    Encounter for immunization  COVID shot today      Patient has been advised of split billing requirements and indicates understanding: Yes      BMI  Estimated body mass index is 25.33 kg/m  as calculated from the following:    Height as of this encounter: 1.48 m (4' 10.27\").    Weight as of this encounter: 55.5 kg (122 lb 4.8 oz).   Weight management plan: Discussed healthy diet and exercise " guidelines    Counseling  Appropriate preventive services were addressed with this patient via screening, questionnaire, or discussion as appropriate for fall prevention, nutrition, physical activity, Tobacco-use cessation, social engagement, weight loss and cognition.  Checklist reviewing preventive services available has been given to the patient.  Reviewed patient's diet, addressing concerns and/or questions.   The patient was instructed to see the dentist every 6 months.   Updated plan of care.  Patient reported difficulty with activities of daily living were addressed today.The patient was provided with written information regarding signs of hearing loss.   Information on urinary incontinence and treatment options given to patient.         Janice Hsu is a very pleasant 89 year old accompanied by her daughter as well as a [facility] , presenting for the following:  Physical ( Non fasting. ) and Immunization    Here today for annual wellness visit.  Overall doing very well.  Recent home visit/annual assessment with gerontology team.  Appetite is stable.  Denies constipation.  No falls in the past 8 months.  Great support from family.  Weight stable.  Mood is stable.  Requesting urinalysis today given history of recurrent UTIs.    Wt Readings from Last 10 Encounters:   12/17/24 55.5 kg (122 lb 4.8 oz)   09/17/24 55.3 kg (122 lb)   08/06/24 48.1 kg (106 lb)   06/27/24 55.7 kg (122 lb 11.2 oz)   06/24/24 58 kg (127 lb 14.4 oz)   06/21/24 58.9 kg (129 lb 12.8 oz)   06/18/24 58.6 kg (129 lb 3.2 oz)   06/14/24 59.8 kg (131 lb 12.8 oz)   06/05/24 67.1 kg (147 lb 14.9 oz)   03/13/24 63.1 kg (139 lb 1.6 oz)             12/17/2024     8:42 AM   Additional Questions   Roomed by Arin ATWOOD   Accompanied by Adam Wyatt         Health Care Directive  Patient does not have a Health Care Directive: Discussed advance care planning with patient; however, patient declined at this time.      12/17/2024   General  Health   How would you rate your overall physical health? Good   Feel stress (tense, anxious, or unable to sleep) Only a little      (!) STRESS CONCERN      12/17/2024   Nutrition   Diet: Regular (no restrictions)            12/17/2024   Exercise   Days per week of moderate/strenous exercise 0 days      (!) EXERCISE CONCERN      12/17/2024   Social Factors   Frequency of gathering with friends or relatives More than three times a week   Worry food won't last until get money to buy more No   Food not last or not have enough money for food? No   Do you have housing? (Housing is defined as stable permanent housing and does not include staying ouside in a car, in a tent, in an abandoned building, in an overnight shelter, or couch-surfing.) No   Are you worried about losing your housing? No   Lack of transportation? No   Unable to get utilities (heat,electricity)? No   Want help with housing or utility concern? No      (!) HOUSING CONCERN PRESENT      12/17/2024   Fall Risk   Fallen 2 or more times in the past year? Yes    Trouble with walking or balance? Yes    Reason Gait Speed Test Not Completed Patient does not tolerate an upright or standing position (e.g. wheelchair)       Patient-reported          12/17/2024   Activities of Daily Living- Home Safety   Needs help with the following daily activites Telephone use    Transportation    Shopping    Preparing meals    Housework    Bathing    Laundry    Medication administration    Money management    Toileting    Feeding    Dressing    None of the above   Safety concerns in the home None of the above       Multiple values from one day are sorted in reverse-chronological order         12/17/2024   Dental   Dentist two times every year? (!) NO            12/17/2024   Hearing Screening   Hearing concerns? (!) TROUBLE UNDERSTANDING SOFT OR WHISPERED SPEECH.            12/17/2024   Driving Risk Screening   Patient/family members have concerns about driving No             12/17/2024   General Alertness/Fatigue Screening   Have you been more tired than usual lately? No            12/17/2024   Urinary Incontinence Screening   Bothered by leaking urine in past 6 months Yes               Today's PHQ-2 Score:       12/17/2024     8:42 AM   PHQ-2 ( 1999 Pf[employer])   PHQ-2 Score Incomplete           12/17/2024   Substance Use   Alcohol more than 3/day or more than 7/wk No   Do you have a current opioid prescription? No   How severe/bad is pain from 1 to 10? 2/10   Do you use any other substances recreationally? No        Social History     Tobacco Use    Smoking status: Never    Smokeless tobacco: Never   Vaping Use    Vaping status: Never Used   Substance Use Topics    Alcohol use: No    Drug use: No                    Reviewed and updated as needed this visit by Provider                    Past Medical History:   Diagnosis Date    Hypertension      Past Surgical History:   Procedure Laterality Date    CHOLECYSTECTOMY      RELEASE CARPAL TUNNEL Right 6/13/2016     Lab work is in process  Labs reviewed in EPIC  BP Readings from Last 3 Encounters:   12/17/24 122/75   09/17/24 128/75   08/06/24 117/74    Wt Readings from Last 3 Encounters:   12/17/24 55.5 kg (122 lb 4.8 oz)   09/17/24 55.3 kg (122 lb)   08/06/24 48.1 kg (106 lb)                  Patient Active Problem List   Diagnosis    Insomnia, unspecified type    Hypertension, unspecified type    Gastroesophageal reflux disease without esophagitis    Osteoporosis without current pathological fracture, unspecified osteoporosis type    Chronic cough    Constipation, unspecified constipation type    Depression, unspecified depression type    Falls frequently    Poor balance    Varicose veins of both lower extremities, unspecified whether complicated    Closed compression fracture of L4 vertebra (H)    Hyponatremia    Abdominal pain of unknown etiology    Intractable vomiting with nausea, unspecified vomiting type    Gastritis    Urinary  incontinence without sensory awareness    Shortness of breath    Acute pulmonary edema (H)    Acute cystitis with hematuria    Acute on chronic congestive heart failure, unspecified heart failure type (H)     Past Surgical History:   Procedure Laterality Date    CHOLECYSTECTOMY      RELEASE CARPAL TUNNEL Right 6/13/2016       Social History     Tobacco Use    Smoking status: Never    Smokeless tobacco: Never   Substance Use Topics    Alcohol use: No     Family History   Problem Relation Age of Onset    Unknown/Adopted Mother     Unknown/Adopted Father          Current Outpatient Medications   Medication Sig Dispense Refill    acetaminophen (TYLENOL) 325 MG tablet Take 650 mg by mouth every 4 hours as needed for mild pain Max acetaminophen dose: 4000 mg in 24 hrs      amLODIPine (NORVASC) 5 MG tablet Take 1 tablet (5 mg) by mouth daily 60 tablet 2    Ascorbic Acid (VITAMIN C/BIOFLAVONOIDS/ROSEHP PO) Take 1 tablet by mouth daily       aspirin (ASPIRIN LOW DOSE) 81 MG EC tablet TAKE 1 TABLET BY MOUTH EVERY DAY WITH FOOD 90 tablet 2    bisacodyl (DULCOLAX) 10 MG suppository Place 1 suppository (10 mg) rectally daily as needed for constipation 10 suppository 1    calcium carbonate-vitamin D (OSCAL) 500-5 MG-MCG tablet TAKE 1 TABLET BY MOUTH TWICE A DAY WITH MEALS 180 tablet 2    carboxymethylcellulose (CARBOXYMETHYLCELLULOSE SODIUM) 0.5 % SOLN ophthalmic solution 1 drop 2 times daily as needed for dry eyes.      cholecalciferol (VITAMIN D3) 1000 UNIT tablet Take 1,000 Units by mouth daily       cyanocobalamin (VITAMIN B-12) 1000 MCG tablet Take 1,000 mcg by mouth daily      diclofenac (VOLTAREN) 1 % topical gel APPLY 4 G TOPICALLY 2 TIMES DAILY 200 g 1    Lidocaine (LIDOCARE) 4 % Patch Place 1 patch onto the skin every 24 hours To prevent lidocaine toxicity, patient should be patch free for 12 hrs daily. 15 patch 0    loteprednol (ALREX) 0.2 % SUSP ophthalmic susp PUT 1 DROP INTO BOTH EYES DAILY AS NEEDED. **BUY PATADAY  OTC IF TOO EXPENSIVE**      Multiple Vitamins-Minerals (CENTRUM FRESH/FRUITY 50+ PO) Take 1 tablet by mouth daily      Omega-3 Fatty Acids (OMEGA-3 FISH OIL) 1200 MG CAPS Take 1,200 mg by mouth daily       omeprazole (PRILOSEC) 40 MG DR capsule Take 1 capsule (40 mg) by mouth daily 90 capsule 1    polyethylene glycol (MIRALAX) 17 GM/Dose powder Take 17 g by mouth 2 times daily      SENEXON-S 8.6-50 MG tablet TAKE 1 TABLET BY MOUTH 2 TIMES DAILY AS NEEDED FOR CONSTIPATION 60 tablet 1    sertraline (ZOLOFT) 50 MG tablet TAKE 1 TABLET BY MOUTH EVERY DAY 90 tablet 3     No Known Allergies  Recent Labs   Lab Test 12/17/24  0930 07/01/24 2039 06/24/24  0800 06/06/24  0718 06/05/24  1039 08/17/22  1030 02/16/22  1019 05/22/21  0632 05/21/21 2008 12/26/19  1732 08/20/19  1558 08/01/18  1056   A1C 4.7  --   --   --   --   --   --   --   --   --   --   --    LDL  --   --   --   --   --   --   --   --   --   --  139*  --    HDL  --   --   --   --   --   --   --   --   --   --  47*  --    TRIG  --   --   --   --   --   --   --   --   --   --  136  --    ALT  --  8  --   --  14  --   --   --  18   < > 18 18   CR  --  0.53 0.66   < > 0.48*   < > 0.76 0.54 0.61   < > 0.72 0.66   GFRESTIMATED  --  81 77   < > 83   < > 70 78 75   < > 70 84   GFRESTBLACK  --   --   --   --   --   --   --  >90 87   < > 81 >90   POTASSIUM  --  3.3* 3.5   < > 3.7   < > 4.7 3.8 3.7   < > 4.3 4.1   TSH  --   --   --   --   --   --  2.38  --   --   --   --  1.59    < > = values in this interval not displayed.      Current providers sharing in care for this patient include:  Patient Care Team:  Ervin Ledesma PA-C as PCP - General (Physician Assistant - Medical)  Flora Luis LSW as Lead Care Coordinator (Primary Care - CC)  Kendra Lang, PharmD as Pharmacist (Pharmacist)  Ervin Ledesma PA-C as Assigned PCP    The following health maintenance items are reviewed in Epic and correct as of today:  Health Maintenance   Topic Date Due  "   HF ACTION PLAN  Never done    RSV VACCINE (1 - 1-dose 75+ series) Never done    ZOSTER IMMUNIZATION (2 of 3) 10/23/2013    LIPID  08/20/2020    DTAP/TDAP/TD IMMUNIZATION (2 - Td or Tdap) 01/08/2024    MEDICARE ANNUAL WELLNESS VISIT  08/18/2024    BMP  01/01/2025    ALT  07/01/2025    ANNUAL REVIEW OF HM ORDERS  08/06/2025    FALL RISK ASSESSMENT  12/17/2025    CBC  12/17/2025    ADVANCE CARE PLANNING  12/17/2029    TSH W/FREE T4 REFLEX  Completed    PHQ-2 (once per calendar year)  Completed    INFLUENZA VACCINE  Completed    Pneumococcal Vaccine: 65+ Years  Completed    COVID-19 Vaccine  Completed    HPV IMMUNIZATION  Aged Out    MENINGITIS IMMUNIZATION  Aged Out    RSV MONOCLONAL ANTIBODY  Aged Out         Review of Systems  Constitutional, neuro, ENT, endocrine, pulmonary, cardiac, gastrointestinal, genitourinary, musculoskeletal, integument and psychiatric systems are negative, except as otherwise noted.     Objective    Exam  /75   Pulse 97   Temp 96.8  F (36  C) (Oral)   Resp 18   Ht 1.48 m (4' 10.27\")   Wt 55.5 kg (122 lb 4.8 oz)   LMP  (LMP Unknown)   SpO2 93%   BMI 25.33 kg/m     Estimated body mass index is 25.33 kg/m  as calculated from the following:    Height as of this encounter: 1.48 m (4' 10.27\").    Weight as of this encounter: 55.5 kg (122 lb 4.8 oz).    Physical Exam  GENERAL: alert and no distress  EYES: Eyes grossly normal to inspection, PERRL and conjunctivae and sclerae normal  HENT: ear canals and TM's normal, nose and mouth without ulcers or lesions  NECK: no adenopathy, no asymmetry, masses, or scars  RESP: lungs clear to auscultation - no rales, rhonchi or wheezes  CV: regular rate and rhythm, normal S1 S2, no S3 or S4, no murmur, click or rub, no peripheral edema  ABDOMEN: soft, nontender  MS: no gross musculoskeletal defects noted, no edema  SKIN: no suspicious lesions or rashes  NEURO: Normal strength and tone, mentation intact and speech normal  PSYCH: mentation appears " normal, affect normal/bright        12/17/2024   Mini Cog   Mini-Cog Not Completed (choose reason) Language barrier and no  present            The likelihood of other entities in the differential is insufficient to justify any further testing for them at this time. This was explained to the patient. The patient was advised that persistent or worsening symptoms would require further evaluation. Patient advised to call the office and if unable to reach to go to the emergency room if they develop any new or worsening symptoms. Expressed understanding and agreement with above stated plan.     Signed Electronically by: Ervin Ledesma PA-C

## 2024-12-18 ENCOUNTER — TELEPHONE (OUTPATIENT)
Dept: FAMILY MEDICINE | Facility: CLINIC | Age: 89
End: 2024-12-18
Payer: COMMERCIAL

## 2024-12-18 NOTE — TELEPHONE ENCOUNTER
Patient Contact    Attempt # 1 with Carmen José Miguel ID#[id number]    Was call answered? No.    Left message for patient to call triage back.    Eula Gonsales RN

## 2024-12-18 NOTE — RESULT ENCOUNTER NOTE
Can we call her family with results:    Negative urinalysis.  Normal electrolytes, kidney function, and liver function.  Stable blood counts.  No signs of anemia.  Normal thyroid.  A1c shows no sign of diabetes or prediabetes.  Overall a great report!  You can access the FollowMyHealth Patient Portal offered by [facility] by registering at the following website: [email or web address]. By joining [employer]’s FollowMyHealth portal, you will also be able to view your health information using other applications (apps) compatible with our system. You can access the FollowMyHealth Patient Portal offered by [facility] by registering at the following website: [email or web address]. By joining [employer]’s FollowMyHealth portal, you will also be able to view your health information using other applications (apps) compatible with our system.

## 2024-12-18 NOTE — TELEPHONE ENCOUNTER
----- Message from Ervin Hydestein sent at 12/18/2024  7:15 AM CST -----  Can we call her family with results:    Negative urinalysis.  Normal electrolytes, kidney function, and liver function.  Stable blood counts.  No signs of anemia.  Normal thyroid.  A1c shows no sign of diabetes or prediabetes.  Overall a great report!

## 2024-12-19 NOTE — TELEPHONE ENCOUNTER
Magui : [id number]    Writer relayed patient's results to daughter. Daughter expressed understanding.

## 2025-01-11 DIAGNOSIS — R13.10 DYSPHAGIA, UNSPECIFIED TYPE: ICD-10-CM

## 2025-01-13 RX ORDER — OMEPRAZOLE 40 MG/1
40 CAPSULE, DELAYED RELEASE ORAL DAILY
Qty: 90 CAPSULE | Refills: 3 | Status: SHIPPED | OUTPATIENT
Start: 2025-01-13

## 2025-02-26 ENCOUNTER — PATIENT OUTREACH (OUTPATIENT)
Dept: GERIATRIC MEDICINE | Facility: CLINIC | Age: OVER 89
End: 2025-02-26
Payer: COMMERCIAL

## 2025-02-26 NOTE — PROGRESS NOTES
[facility] Care Coordination Contact    Received task from care coordinator.  Completed following tasks: Submitted referrals/auths for Consultation Services and Updated services in Database.    Yuliet Dailey  Care Management Specialist  [facility]  [phone]

## 2025-03-11 ENCOUNTER — TELEPHONE (OUTPATIENT)
Dept: FAMILY MEDICINE | Facility: CLINIC | Age: OVER 89
End: 2025-03-11

## 2025-03-11 ENCOUNTER — OFFICE VISIT (OUTPATIENT)
Dept: FAMILY MEDICINE | Facility: CLINIC | Age: OVER 89
End: 2025-03-11
Payer: COMMERCIAL

## 2025-03-11 VITALS
SYSTOLIC BLOOD PRESSURE: 137 MMHG | HEIGHT: 58 IN | WEIGHT: 120.7 LBS | RESPIRATION RATE: 20 BRPM | TEMPERATURE: 97.1 F | OXYGEN SATURATION: 94 % | DIASTOLIC BLOOD PRESSURE: 85 MMHG | HEART RATE: 89 BPM | BODY MASS INDEX: 25.34 KG/M2

## 2025-03-11 DIAGNOSIS — N39.0 FREQUENT UTI: ICD-10-CM

## 2025-03-11 DIAGNOSIS — F32.A DEPRESSION, UNSPECIFIED DEPRESSION TYPE: ICD-10-CM

## 2025-03-11 DIAGNOSIS — I10 HYPERTENSION, UNSPECIFIED TYPE: Primary | ICD-10-CM

## 2025-03-11 LAB
ALBUMIN UR-MCNC: 30 MG/DL
APPEARANCE UR: CLEAR
BACTERIA #/AREA URNS HPF: ABNORMAL /HPF
BILIRUB UR QL STRIP: NEGATIVE
CAOX CRY #/AREA URNS HPF: ABNORMAL /HPF
COLOR UR AUTO: YELLOW
GLUCOSE UR STRIP-MCNC: NEGATIVE MG/DL
HGB UR QL STRIP: NEGATIVE
KETONES UR STRIP-MCNC: ABNORMAL MG/DL
LEUKOCYTE ESTERASE UR QL STRIP: NEGATIVE
NITRATE UR QL: NEGATIVE
PH UR STRIP: 6 [PH] (ref 5–7)
RBC #/AREA URNS AUTO: ABNORMAL /HPF
SP GR UR STRIP: 1.02 (ref 1–1.03)
SQUAMOUS #/AREA URNS AUTO: ABNORMAL /LPF
UROBILINOGEN UR STRIP-ACNC: 0.2 E.U./DL
WBC #/AREA URNS AUTO: ABNORMAL /HPF

## 2025-03-11 PROCEDURE — 99213 OFFICE O/P EST LOW 20 MIN: CPT | Performed by: PHYSICIAN ASSISTANT

## 2025-03-11 PROCEDURE — 1126F AMNT PAIN NOTED NONE PRSNT: CPT | Performed by: PHYSICIAN ASSISTANT

## 2025-03-11 PROCEDURE — 81001 URINALYSIS AUTO W/SCOPE: CPT | Performed by: PHYSICIAN ASSISTANT

## 2025-03-11 PROCEDURE — G2211 COMPLEX E/M VISIT ADD ON: HCPCS | Performed by: PHYSICIAN ASSISTANT

## 2025-03-11 PROCEDURE — 3075F SYST BP GE 130 - 139MM HG: CPT | Performed by: PHYSICIAN ASSISTANT

## 2025-03-11 PROCEDURE — 3079F DIAST BP 80-89 MM HG: CPT | Performed by: PHYSICIAN ASSISTANT

## 2025-03-11 ASSESSMENT — PAIN SCALES - GENERAL: PAINLEVEL_OUTOF10: NO PAIN (0)

## 2025-03-11 NOTE — PROGRESS NOTES
Assessment & Plan     Hypertension, unspecified type  Depression, unspecified depression type  Frequent UTI  All conditions well-controlled/managed.  Repeat urinalysis today.   Plan to follow-up in 6 months.  Sooner as needed.  She is doing excellent and has great support from family, friends.    - UA Macroscopic with reflex to Microscopic and Culture - Lab Collect  - UA Macroscopic with reflex to Microscopic and Culture - Lab Collect      20 minutes spent by me on the date of the encounter on chart review, review of test results, interpretation of tests, patient visit, and documentation       The longitudinal plan of care for the diagnosis(es)/condition(s) as documented were addressed during this visit. Due to the added complexity in care, I will continue to support Aracelis in the subsequent management and with ongoing continuity of care.    Return in about 6 months (around 9/11/2025).    The likelihood of other entities in the differential is insufficient to justify any further testing for them at this time. This was explained to the patient. The patient was advised that persistent or worsening symptoms would require further evaluation. Patient advised to call the office and if unable to reach to go to the emergency room if they develop any new or worsening symptoms. Expressed understanding and agreement with above stated plan.     JIMMY Guadarrama [facility]   Aracelis Blakely is a very pleasant 89 year old female presenting for the following health issues:  Patient presents with:  Follow Up    Accompanied today by her daughter for routine follow-up.  She is overall doing very well.   Daughter spends approximately 12 hours/day with her.  Received great support from additional family members as well as people in her apartment building.    No recent falls.  Ambulating appropriately with walker.  Mood is stable.  Requesting urine test today given history of recurrent  "UTIs.  Denies active symptoms.  She is recovering from a recent GI illness.    Up-to-date on flu, COVID, and RSV immunizations.      Review of Systems   Constitutional, HEENT, cardiovascular, pulmonary, GI, , musculoskeletal, neuro, skin, endocrine and psych systems are negative, except as otherwise noted.      Objective    /85 (BP Location: Right arm, Patient Position: Sitting, Cuff Size: Adult Large)   Pulse 89   Temp 97.1  F (36.2  C) (Oral)   Resp 20   Ht 1.473 m (4' 10\")   Wt 54.7 kg (120 lb 11.2 oz)   LMP  (LMP Unknown)   SpO2 94%   Breastfeeding No   BMI 25.23 kg/m    4' 10\"  120 lbs 11.2 oz    EXAM:  GENERAL APPEARANCE: healthy, alert and no distress  EYES: Eyes grossly normal to inspection, PERRL and conjunctivae and sclerae normal  NECK: no asymmetry, masses, or scars  RESP: lungs clear to auscultation - no rales, rhonchi or wheezes  CV: regular rates and rhythm, normal S1 S2, no S3 or S4 and no murmur, click or rub  MS: extremities normal- no gross deformities noted  SKIN: no suspicious lesions or rashes  NEURO: Normal strength and tone, mentation intact and speech normal  PSYCH: mentation appears normal and affect normal      "

## 2025-03-11 NOTE — TELEPHONE ENCOUNTER
Ervin Ledesma PA-C sent to [facility] [place] - [facility]  Can we call her daughter and let her know that her urine is negative - no signs of infection.

## 2025-03-12 NOTE — TELEPHONE ENCOUNTER
Provider's message relayed to Patient's daughter (C2C on file). No further questions or concerns at this time.    Joselyn TORO,  Triage RN  [facility]

## 2025-03-19 NOTE — LETTER
March 29, 2022      ARACELIS DIAZ  [address]   [place] [address]      Dear Aracelis:    At [facility], we are dedicated to improving your health and well-being. Enclosed is the Comprehensive Care Plan that we developed with you on 3/1/2022. Please review the Care Plan carefully.    As a reminder, some of the things we discussed at your visit include:    Your physical and mental health    Ways to reduce falls    Health care needs you may have    Don t forget to contact your care coordinator if you:    Have been hospitalized or plan to be hospitalized     Have had a fall     Have experienced a change in physical health    Are experiencing emotional problems     If you do not agree with your Care Plan, have questions about it, or have experienced a change in your needs, please call me at [phone]. If you are hearing impaired, please call the [place] Relay at 230 or [phone] (iqusou-cs-odwvtf relay service).    Sincerely,    MARTA Lino    E-mail: [email or web address]  Phone: [phone]      [facility] ([facility]) is a health plan that contracts with both Medicare and the [place] Medical Assistance (Medicaid) program to provide benefits of both programs to enrollees. Enrollment in [facility] depends on contract renewal.    [id number])     [id number] (11/18)                         Patient Education   Preventive Care Advice   This is general advice given by our system to help you stay healthy. However, your care team may have specific advice just for you. Please talk to your care team about your preventive care needs.  Nutrition  Eat 5 or more servings of fruits and vegetables each day.  Try wheat bread, brown rice and whole grain pasta (instead of white bread, rice, and pasta).  Get enough calcium and vitamin D. Check the label on foods and aim for 100% of the RDA (recommended daily allowance).  Lifestyle  Exercise at least 150 minutes each week  (30 minutes a day, 5 days a week).  Do muscle strengthening activities 2 days a week. These help control your weight and prevent disease.  No smoking.  Wear sunscreen to prevent skin cancer.  Have a dental exam and cleaning every 6 months.  Yearly exams  See your health care team every year to talk about:  Any changes in your health.  Any medicines your care team has prescribed.  Preventive care, family planning, and ways to prevent chronic diseases.  Shots (vaccines)   HPV shots (up to age 26), if you've never had them before.  Hepatitis B shots (up to age 59), if you've never had them before.  COVID-19 shot: Get this shot when it's due.  Flu shot: Get a flu shot every year.  Tetanus shot: Get a tetanus shot every 10 years.  Pneumococcal, hepatitis A, and RSV shots: Ask your care team if you need these based on your risk.  Shingles shot (for age 50 and up)  General health tests  Diabetes screening:  Starting at age 35, Get screened for diabetes at least every 3 years.  If you are younger than age 35, ask your care team if you should be screened for diabetes.  Cholesterol test: At age 39, start having a cholesterol test every 5 years, or more often if advised.  Bone density scan (DEXA): At age 50, ask your care team if you should have this scan for osteoporosis (brittle bones).  Hepatitis C: Get tested at least once in your life.  STIs (sexually  transmitted infections)  Before age 24: Ask your care team if you should be screened for STIs.  After age 24: Get screened for STIs if you're at risk. You are at risk for STIs (including HIV) if:  You are sexually active with more than one person.  You don't use condoms every time.  You or a partner was diagnosed with a sexually transmitted infection.  If you are at risk for HIV, ask about PrEP medicine to prevent HIV.  Get tested for HIV at least once in your life, whether you are at risk for HIV or not.  Cancer screening tests  Cervical cancer screening: If you have a cervix, begin getting regular cervical cancer screening tests starting at age 21.  Breast cancer scan (mammogram): If you've ever had breasts, begin having regular mammograms starting at age 40. This is a scan to check for breast cancer.  Colon cancer screening: It is important to start screening for colon cancer at age 45.  Have a colonoscopy test every 10 years (or more often if you're at risk) Or, ask your provider about stool tests like a FIT test every year or Cologuard test every 3 years.  To learn more about your testing options, visit:   .  For help making a decision, visit:   https://bit.ly/oa18376.  Prostate cancer screening test: If you have a prostate, ask your care team if a prostate cancer screening test (PSA) at age 55 is right for you.  Lung cancer screening: If you are a current or former smoker ages 50 to 80, ask your care team if ongoing lung cancer screenings are right for you.  For informational purposes only. Not to replace the advice of your health care provider. Copyright   2023 [employer]. All rights reserved. Clinically reviewed by the [facility] Transitions Program. Wear 489160 - REV 01/24.

## 2025-03-24 ENCOUNTER — PATIENT OUTREACH (OUTPATIENT)
Dept: GERIATRIC MEDICINE | Facility: CLINIC | Age: OVER 89
End: 2025-03-24
Payer: COMMERCIAL

## 2025-03-24 NOTE — PROGRESS NOTES
for [facility] Phone: [phone] requesting a call back with an update on CFSS consultation referral that was placed on 02/26/25 for 01/01/25-12/31/25 service dates. PCA will end on 06/30/25 if consultation has not been completed per DHS guidelines.    Flora Luis MARÍA  [facility]  [phone]  Fax: [phone]

## 2025-04-24 ENCOUNTER — TELEPHONE (OUTPATIENT)
Dept: FAMILY MEDICINE | Facility: CLINIC | Age: OVER 89
End: 2025-04-24
Payer: COMMERCIAL

## 2025-04-24 DIAGNOSIS — I50.9 ACUTE ON CHRONIC CONGESTIVE HEART FAILURE, UNSPECIFIED HEART FAILURE TYPE (H): ICD-10-CM

## 2025-04-24 DIAGNOSIS — N30.01 ACUTE CYSTITIS WITH HEMATURIA: ICD-10-CM

## 2025-04-24 DIAGNOSIS — K59.00 CONSTIPATION, UNSPECIFIED CONSTIPATION TYPE: ICD-10-CM

## 2025-04-24 RX ORDER — AMLODIPINE BESYLATE 5 MG/1
5 TABLET ORAL DAILY
Qty: 90 TABLET | Refills: 3 | Status: SHIPPED | OUTPATIENT
Start: 2025-04-24

## 2025-04-24 NOTE — CONFIDENTIAL NOTE
Medication Question or Refill        What medication are you calling about (include dose and sig)?: amLODIPine (NORVASC) 5 MG tablet     Preferred Pharmacy:   [facility] [id number] [place] [facility] [place] - [address]  [address] [place] [address]  Phone: [phone] Fax: [phone]      Controlled Substance Agreement on file:   CSA -- Patient Level:    CSA: None found at the patient level.       Who prescribed the medication?: Ervin Ledesma PA-C    Do you need a refill? Yes    When did you use the medication last? 04/23/25    Patient offered an appointment? No:    Do you have any questions or concerns?  Yes: Patient is out of medication and Pt's daughter asking if medication can be filled for 90 days going forward       Could we send this information to you in [employer] or would you prefer to receive a phone call?:   Patient would prefer a phone call   Okay to leave a detailed message?: Yes at Cell number on file:    Telephone Information:   Mobile [phone]

## 2025-04-26 DIAGNOSIS — I10 HYPERTENSION, UNSPECIFIED TYPE: ICD-10-CM

## 2025-04-26 DIAGNOSIS — M81.0 OSTEOPOROSIS WITHOUT CURRENT PATHOLOGICAL FRACTURE, UNSPECIFIED OSTEOPOROSIS TYPE: ICD-10-CM

## 2025-04-28 RX ORDER — ASPIRIN 81 MG/1
TABLET, COATED ORAL
Qty: 90 TABLET | Refills: 1 | Status: SHIPPED | OUTPATIENT
Start: 2025-04-28

## 2025-06-10 ENCOUNTER — TELEPHONE (OUTPATIENT)
Dept: FAMILY MEDICINE | Facility: CLINIC | Age: OVER 89
End: 2025-06-10
Payer: COMMERCIAL

## 2025-06-10 DIAGNOSIS — R60.0 BILATERAL LEG EDEMA: ICD-10-CM

## 2025-06-10 DIAGNOSIS — I50.9 ACUTE ON CHRONIC CONGESTIVE HEART FAILURE, UNSPECIFIED HEART FAILURE TYPE (H): Primary | ICD-10-CM

## 2025-06-10 NOTE — TELEPHONE ENCOUNTER
Called and spoke to patient's daughter (Yoselin - SILVIOC on file), requested order to be faxed to [facility] ([address]) [place].    Order faxed to [phone].    Audrey TORO MA on 6/10/2025 at 12:11 PM

## 2025-06-25 ENCOUNTER — PATIENT OUTREACH (OUTPATIENT)
Dept: GERIATRIC MEDICINE | Facility: CLINIC | Age: OVER 89
End: 2025-06-25
Payer: COMMERCIAL

## 2025-06-25 NOTE — PROGRESS NOTES
[facility] Care Coordination Contact    Received authorization task for extended temp PCA from care coordinator.  Completed following tasks: Updated services in Database.   and [employer]:  Emailed referral form for auth to [employer].    Yuliet Mueller    Care Management Specialist   [facility]  [phone]

## 2025-06-25 NOTE — PROGRESS NOTES
[facility] Care Coordination Contact      [facility] Six-Month Telephone Assessment    6 month telephone assessment completed on 06/25/2025.    ER visits: No  Hospitalizations: No  TCU stays: No  Significant health status changes: none reported  Falls/Injuries: No  ADL/IADL changes: No  Changes in services: No    Caregiver Assessment follow up: Completed w/ JOSE Corbin.    Goals: See Support Plan for goal progress documentation.      Will see member in 6 months for an annual health risk assessment.   Encouraged member to call CC with any questions or concerns in the meantime.     MARTA Lino  [facility]  [phone]  Fax: [phone]

## 2025-06-30 DIAGNOSIS — K59.00 CONSTIPATION, UNSPECIFIED CONSTIPATION TYPE: ICD-10-CM

## 2025-06-30 DIAGNOSIS — N30.01 ACUTE CYSTITIS WITH HEMATURIA: ICD-10-CM

## 2025-06-30 DIAGNOSIS — I50.9 ACUTE ON CHRONIC CONGESTIVE HEART FAILURE, UNSPECIFIED HEART FAILURE TYPE (H): ICD-10-CM

## 2025-06-30 RX ORDER — DOCUSATE SODIUM 50MG AND SENNOSIDES 8.6MG 8.6; 5 MG/1; MG/1
1 TABLET, FILM COATED ORAL 2 TIMES DAILY PRN
Qty: 60 TABLET | Refills: 1 | Status: SHIPPED | OUTPATIENT
Start: 2025-06-30

## 2025-07-15 ENCOUNTER — MEDICAL CORRESPONDENCE (OUTPATIENT)
Dept: HEALTH INFORMATION MANAGEMENT | Facility: CLINIC | Age: OVER 89
End: 2025-07-15

## 2025-09-02 ENCOUNTER — DOCUMENTATION ONLY (OUTPATIENT)
Dept: FAMILY MEDICINE | Facility: CLINIC | Age: OVER 89
End: 2025-09-02
Payer: COMMERCIAL

## 2025-09-02 DIAGNOSIS — N39.42 INCONTINENCE WITHOUT SENSORY AWARENESS: Primary | ICD-10-CM
